# Patient Record
Sex: FEMALE | Race: WHITE | NOT HISPANIC OR LATINO | Employment: PART TIME | URBAN - METROPOLITAN AREA
[De-identification: names, ages, dates, MRNs, and addresses within clinical notes are randomized per-mention and may not be internally consistent; named-entity substitution may affect disease eponyms.]

---

## 2022-08-19 ENCOUNTER — HOSPITAL ENCOUNTER (EMERGENCY)
Facility: HOSPITAL | Age: 52
Discharge: HOME | End: 2022-08-19
Attending: EMERGENCY MEDICINE
Payer: MEDICAID

## 2022-08-19 VITALS
RESPIRATION RATE: 18 BRPM | HEIGHT: 63 IN | HEART RATE: 99 BPM | WEIGHT: 146.25 LBS | DIASTOLIC BLOOD PRESSURE: 98 MMHG | SYSTOLIC BLOOD PRESSURE: 177 MMHG | BODY MASS INDEX: 25.91 KG/M2 | TEMPERATURE: 98 F | OXYGEN SATURATION: 99 %

## 2022-08-19 DIAGNOSIS — I10 HYPERTENSION, UNSPECIFIED TYPE: ICD-10-CM

## 2022-08-19 DIAGNOSIS — S60.00XA CONTUSION OF MULTIPLE SITES OF RIGHT HAND AND FINGERS, INITIAL ENCOUNTER: Primary | ICD-10-CM

## 2022-08-19 DIAGNOSIS — S60.221A CONTUSION OF MULTIPLE SITES OF RIGHT HAND AND FINGERS, INITIAL ENCOUNTER: Primary | ICD-10-CM

## 2022-08-19 PROCEDURE — 63600175 PHARM REV CODE 636 W HCPCS: Mod: ER | Performed by: EMERGENCY MEDICINE

## 2022-08-19 PROCEDURE — 25000003 PHARM REV CODE 250: Mod: ER | Performed by: EMERGENCY MEDICINE

## 2022-08-19 PROCEDURE — 90471 IMMUNIZATION ADMIN: CPT | Mod: ER | Performed by: EMERGENCY MEDICINE

## 2022-08-19 PROCEDURE — 90715 TDAP VACCINE 7 YRS/> IM: CPT | Mod: ER | Performed by: EMERGENCY MEDICINE

## 2022-08-19 PROCEDURE — 99284 EMERGENCY DEPT VISIT MOD MDM: CPT | Mod: ER

## 2022-08-19 RX ORDER — CLONIDINE HYDROCHLORIDE 0.1 MG/1
0.1 TABLET ORAL
Status: COMPLETED | OUTPATIENT
Start: 2022-08-19 | End: 2022-08-19

## 2022-08-19 RX ORDER — NAPROXEN 500 MG/1
500 TABLET ORAL 2 TIMES DAILY WITH MEALS
Qty: 60 TABLET | Refills: 0 | Status: SHIPPED | OUTPATIENT
Start: 2022-08-19

## 2022-08-19 RX ADMIN — TETANUS TOXOID, REDUCED DIPHTHERIA TOXOID AND ACELLULAR PERTUSSIS VACCINE, ADSORBED 0.5 ML: 5; 2.5; 8; 8; 2.5 SUSPENSION INTRAMUSCULAR at 01:08

## 2022-08-19 RX ADMIN — CLONIDINE HYDROCHLORIDE 0.1 MG: 0.1 TABLET ORAL at 01:08

## 2022-08-19 NOTE — ED NOTES
Fingers washed with a soft sponge brush and soap. Dead skin cut away. telfa padding used for dressing and then the middle and 3rd finger wrapped together for support. Pt tolerated well.

## 2022-08-19 NOTE — ED PROVIDER NOTES
Encounter Date: 8/19/2022       History     Chief Complaint   Patient presents with    Hand Injury     Pt pulled a heavy table onto her right hand, injuring her first three fingers. Pt needs a tetanus shot.     The history is provided by the patient.   Hand Injury   The incident occurred just prior to arrival. The incident occurred at home. The injury mechanism was compression. The pain is present in the right fingers. The quality of the pain is described as sharp. The pain is at a severity of 7/10. The pain has been constant since the incident. Pertinent negatives include no fever and no malaise/fatigue. She reports no foreign bodies present. The symptoms are aggravated by movement, use and palpation.     Review of patient's allergies indicates:   Allergen Reactions    Demerol [meperidine] Nausea And Vomiting and Other (See Comments)     migraine     Past Medical History:   Diagnosis Date    Abnormal Pap smear     Cryo 1992, history of warts, normal pap since    Hypertension      Past Surgical History:   Procedure Laterality Date    HYSTERECTOMY      RALH    polynidal cyst      tonsillectomy       Family History   Problem Relation Age of Onset    Diabetes Maternal Grandmother     Diabetes Mother     Breast cancer Neg Hx     Colon cancer Neg Hx     Ovarian cancer Neg Hx     Thrombophilia Neg Hx      Social History     Tobacco Use    Smoking status: Current Every Day Smoker     Packs/day: 1.00     Years: 21.00     Pack years: 21.00     Types: Cigarettes    Smokeless tobacco: Never Used   Substance Use Topics    Alcohol use: Yes     Comment: socially    Drug use: No     Review of Systems   Constitutional: Negative for fever and malaise/fatigue.   Musculoskeletal:        Right Hand Injury   All other systems reviewed and are negative.      Physical Exam     Initial Vitals [08/19/22 0120]   BP Pulse Resp Temp SpO2   (!) 199/125 102 19 97.9 °F (36.6 °C) 96 %      MAP       --         Physical  Exam    Nursing note and vitals reviewed.  Constitutional: She appears well-developed and well-nourished. No distress.   HENT:   Head: Normocephalic and atraumatic.   Eyes: Conjunctivae and EOM are normal. Pupils are equal, round, and reactive to light.   Cardiovascular: Normal rate, regular rhythm and normal heart sounds.   Pulmonary/Chest: Breath sounds normal. No respiratory distress.   Abdominal: Abdomen is soft. Bowel sounds are normal.   Musculoskeletal:      Right hand: Swelling, tenderness and bony tenderness present. No deformity or lacerations. Decreased range of motion.      Comments: Right Hand 2,3,4th digit with ecchymoses and abrasion, No laceration, No tendon involvement.     Neurological: She is alert and oriented to person, place, and time. She has normal strength.   Skin: Skin is warm and dry.   Psychiatric: She has a normal mood and affect. Thought content normal.         ED Course   Procedures  Labs Reviewed - No data to display       Imaging Results          X-Ray Hand 3 view Right (Preliminary result)  Result time 08/19/22 01:55:59    ED Interpretation by Maicol Mayfield MD (08/19/22 01:55:59, [facility], Emergency Medicine)    Neg fx                  ED Interpretation by Maicol Mayfield MD (08/19/22 01:52:44, [facility], Emergency Medicine)    No fx                          1:57 AM - Counseling: Spoke with the patient and discussed todays findings, in addition to providing specific details for the plan of care and counseling regarding the diagnosis and prognosis. Questions are answered at this time.    Pre-hypertension/Hypertension: The pt has been informed that they may have pre-hypertension or hypertension based on a blood pressure reading in the ED. I recommend that the pt call the PCP listed on their discharge instructions or a physician of their choice this week to arrange f/u for further evaluation of possible pre-hypertension or  hypertension.         Medications   Tdap (BOOSTRIX) vaccine injection 0.5 mL (0.5 mLs Intramuscular Given 8/19/22 0132)   cloNIDine tablet 0.1 mg (0.1 mg Oral Given 8/19/22 0154)                          Clinical Impression:   Final diagnoses:  [I10] Hypertension, unspecified type  [S60.221A, S60.00XA] Contusion of multiple sites of right hand and fingers, initial encounter (Primary)          ED Disposition Condition    Discharge Stable        ED Prescriptions     Medication Sig Dispense Start Date End Date Auth. Provider    naproxen (NAPROSYN) 500 MG tablet Take 1 tablet (500 mg total) by mouth 2 (two) times daily with meals. 60 tablet 8/19/2022  Maicol Mayfield MD        Follow-up Information     Follow up With Specialties Details Why Contact Info    Quintin Ying MD Family Medicine Call in 1 week As needed [address] [place] [address] FAMILY MEDICINE  [facility] [place] [id number]  [phone]      [facility] - Emergency Dept Emergency Medicine  If symptoms worsen [id number] [address] [id number] [phone]           Maicol Mayfield MD  08/19/22 0157

## 2024-05-27 ENCOUNTER — HOSPITAL ENCOUNTER (INPATIENT)
Facility: HOSPITAL | Age: 54
LOS: 4 days | Discharge: HOME | DRG: 321 | End: 2024-05-31
Attending: EMERGENCY MEDICINE | Admitting: FAMILY MEDICINE
Payer: MEDICAID

## 2024-05-27 DIAGNOSIS — Z78.9 ALCOHOL USE: ICD-10-CM

## 2024-05-27 DIAGNOSIS — I20.89 STABLE ANGINA PECTORIS: ICD-10-CM

## 2024-05-27 DIAGNOSIS — R79.89 ELEVATED TROPONIN: ICD-10-CM

## 2024-05-27 DIAGNOSIS — R00.2 HEART PALPITATIONS: ICD-10-CM

## 2024-05-27 DIAGNOSIS — I51.7 LVH (LEFT VENTRICULAR HYPERTROPHY): ICD-10-CM

## 2024-05-27 DIAGNOSIS — I21.4 NSTEMI (NON-ST ELEVATED MYOCARDIAL INFARCTION): ICD-10-CM

## 2024-05-27 DIAGNOSIS — I16.1 HYPERTENSIVE EMERGENCY: ICD-10-CM

## 2024-05-27 DIAGNOSIS — M62.838 MUSCLE SPASMS OF LOWER EXTREMITY: ICD-10-CM

## 2024-05-27 DIAGNOSIS — I25.10 CAD, MULTIPLE VESSEL: ICD-10-CM

## 2024-05-27 DIAGNOSIS — I50.9 NEW ONSET OF CONGESTIVE HEART FAILURE: ICD-10-CM

## 2024-05-27 DIAGNOSIS — I42.9 CARDIOMYOPATHY, UNSPECIFIED TYPE: ICD-10-CM

## 2024-05-27 DIAGNOSIS — I20.9 ANGINA PECTORIS: ICD-10-CM

## 2024-05-27 DIAGNOSIS — I51.7 CARDIOMEGALY: ICD-10-CM

## 2024-05-27 DIAGNOSIS — R07.9 CHEST PAIN: ICD-10-CM

## 2024-05-27 DIAGNOSIS — R79.89 ELEVATED BRAIN NATRIURETIC PEPTIDE (BNP) LEVEL: ICD-10-CM

## 2024-05-27 DIAGNOSIS — R00.2 PALPITATIONS: ICD-10-CM

## 2024-05-27 DIAGNOSIS — Z72.0 TOBACCO USE: Primary | ICD-10-CM

## 2024-05-27 LAB
ALBUMIN SERPL BCP-MCNC: 4.8 G/DL (ref 3.5–5.2)
ALP SERPL-CCNC: 85 U/L (ref 55–135)
ALT SERPL W/O P-5'-P-CCNC: 20 U/L (ref 10–44)
AMPHET+METHAMPHET UR QL: NEGATIVE
ANION GAP SERPL CALC-SCNC: 17 MMOL/L (ref 8–16)
AST SERPL-CCNC: 28 U/L (ref 10–40)
BARBITURATES UR QL SCN>200 NG/ML: NEGATIVE
BASOPHILS # BLD AUTO: 0.03 K/UL (ref 0–0.2)
BASOPHILS NFR BLD: 0.3 % (ref 0–1.9)
BENZODIAZ UR QL SCN>200 NG/ML: NEGATIVE
BILIRUB SERPL-MCNC: 1.1 MG/DL (ref 0.1–1)
BILIRUB UR QL STRIP: NEGATIVE
BNP SERPL-MCNC: 333 PG/ML (ref 0–99)
BUN SERPL-MCNC: 12 MG/DL (ref 6–20)
BZE UR QL SCN: NEGATIVE
CALCIUM SERPL-MCNC: 10.1 MG/DL (ref 8.7–10.5)
CANNABINOIDS UR QL SCN: NEGATIVE
CHLORIDE SERPL-SCNC: 98 MMOL/L (ref 95–110)
CLARITY UR REFRACT.AUTO: CLEAR
CO2 SERPL-SCNC: 22 MMOL/L (ref 23–29)
COLOR UR AUTO: YELLOW
CREAT SERPL-MCNC: 0.9 MG/DL (ref 0.5–1.4)
CREAT UR-MCNC: 17 MG/DL (ref 15–325)
D DIMER PPP IA.FEU-MCNC: 0.39 MG/L FEU
DIFFERENTIAL METHOD BLD: ABNORMAL
EOSINOPHIL # BLD AUTO: 0.1 K/UL (ref 0–0.5)
EOSINOPHIL NFR BLD: 0.6 % (ref 0–8)
ERYTHROCYTE [DISTWIDTH] IN BLOOD BY AUTOMATED COUNT: 12.5 % (ref 11.5–14.5)
EST. GFR  (NO RACE VARIABLE): >60 ML/MIN/1.73 M^2
ETHANOL SERPL-MCNC: <10 MG/DL
GLUCOSE SERPL-MCNC: 112 MG/DL (ref 70–110)
GLUCOSE UR QL STRIP: NEGATIVE
HCT VFR BLD AUTO: 43.8 % (ref 37–48.5)
HEP C VIRUS HOLD SPECIMEN: NORMAL
HGB BLD-MCNC: 15.8 G/DL (ref 12–16)
HGB UR QL STRIP: NEGATIVE
IMM GRANULOCYTES # BLD AUTO: 0.02 K/UL (ref 0–0.04)
IMM GRANULOCYTES NFR BLD AUTO: 0.2 % (ref 0–0.5)
KETONES UR QL STRIP: NEGATIVE
LEUKOCYTE ESTERASE UR QL STRIP: NEGATIVE
LYMPHOCYTES # BLD AUTO: 2.6 K/UL (ref 1–4.8)
LYMPHOCYTES NFR BLD: 27.6 % (ref 18–48)
MAGNESIUM SERPL-MCNC: 1.4 MG/DL (ref 1.6–2.6)
MCH RBC QN AUTO: 35.6 PG (ref 27–31)
MCHC RBC AUTO-ENTMCNC: 36.1 G/DL (ref 32–36)
MCV RBC AUTO: 99 FL (ref 82–98)
METHADONE UR QL SCN>300 NG/ML: NEGATIVE
MONOCYTES # BLD AUTO: 0.6 K/UL (ref 0.3–1)
MONOCYTES NFR BLD: 5.9 % (ref 4–15)
NEUTROPHILS # BLD AUTO: 6.3 K/UL (ref 1.8–7.7)
NEUTROPHILS NFR BLD: 65.4 % (ref 38–73)
NITRITE UR QL STRIP: NEGATIVE
NRBC BLD-RTO: 0 /100 WBC
OHS QRS DURATION: 86 MS
OHS QTC CALCULATION: 507 MS
OPIATES UR QL SCN: NEGATIVE
PCP UR QL SCN>25 NG/ML: NEGATIVE
PH UR STRIP: 7 [PH] (ref 5–8)
PLATELET # BLD AUTO: 233 K/UL (ref 150–450)
PMV BLD AUTO: 9.3 FL (ref 9.2–12.9)
POTASSIUM SERPL-SCNC: 3.4 MMOL/L (ref 3.5–5.1)
PROT SERPL-MCNC: 8.4 G/DL (ref 6–8.4)
PROT UR QL STRIP: NEGATIVE
RBC # BLD AUTO: 4.44 M/UL (ref 4–5.4)
SODIUM SERPL-SCNC: 137 MMOL/L (ref 136–145)
SP GR UR STRIP: 1.01 (ref 1–1.03)
TOXICOLOGY INFORMATION: NORMAL
TROPONIN I SERPL DL<=0.01 NG/ML-MCNC: 0.02 NG/ML (ref 0–0.03)
TROPONIN I SERPL DL<=0.01 NG/ML-MCNC: 0.03 NG/ML (ref 0–0.03)
TSH SERPL DL<=0.005 MIU/L-ACNC: 3.31 UIU/ML (ref 0.4–4)
URN SPEC COLLECT METH UR: NORMAL
UROBILINOGEN UR STRIP-ACNC: NEGATIVE EU/DL
WBC # BLD AUTO: 9.56 K/UL (ref 3.9–12.7)

## 2024-05-27 PROCEDURE — 96361 HYDRATE IV INFUSION ADD-ON: CPT | Mod: ER

## 2024-05-27 PROCEDURE — 83880 ASSAY OF NATRIURETIC PEPTIDE: CPT | Mod: ER | Performed by: EMERGENCY MEDICINE

## 2024-05-27 PROCEDURE — 85379 FIBRIN DEGRADATION QUANT: CPT | Mod: ER | Performed by: EMERGENCY MEDICINE

## 2024-05-27 PROCEDURE — G0378 HOSPITAL OBSERVATION PER HR: HCPCS | Mod: ER

## 2024-05-27 PROCEDURE — 63600175 PHARM REV CODE 636 W HCPCS: Mod: ER | Performed by: EMERGENCY MEDICINE

## 2024-05-27 PROCEDURE — 81003 URINALYSIS AUTO W/O SCOPE: CPT | Mod: ER,59 | Performed by: EMERGENCY MEDICINE

## 2024-05-27 PROCEDURE — 80053 COMPREHEN METABOLIC PANEL: CPT | Mod: ER | Performed by: EMERGENCY MEDICINE

## 2024-05-27 PROCEDURE — 25000003 PHARM REV CODE 250: Mod: ER | Performed by: EMERGENCY MEDICINE

## 2024-05-27 PROCEDURE — 85025 COMPLETE CBC W/AUTO DIFF WBC: CPT | Mod: ER | Performed by: EMERGENCY MEDICINE

## 2024-05-27 PROCEDURE — 96366 THER/PROPH/DIAG IV INF ADDON: CPT | Mod: 59,ER

## 2024-05-27 PROCEDURE — 86803 HEPATITIS C AB TEST: CPT | Performed by: EMERGENCY MEDICINE

## 2024-05-27 PROCEDURE — 11000001 HC ACUTE MED/SURG PRIVATE ROOM

## 2024-05-27 PROCEDURE — 84443 ASSAY THYROID STIM HORMONE: CPT | Mod: ER | Performed by: EMERGENCY MEDICINE

## 2024-05-27 PROCEDURE — 87389 HIV-1 AG W/HIV-1&-2 AB AG IA: CPT | Performed by: EMERGENCY MEDICINE

## 2024-05-27 PROCEDURE — 82077 ASSAY SPEC XCP UR&BREATH IA: CPT | Mod: ER | Performed by: EMERGENCY MEDICINE

## 2024-05-27 PROCEDURE — 96375 TX/PRO/DX INJ NEW DRUG ADDON: CPT | Mod: 59,ER

## 2024-05-27 PROCEDURE — 84484 ASSAY OF TROPONIN QUANT: CPT | Mod: ER | Performed by: EMERGENCY MEDICINE

## 2024-05-27 PROCEDURE — 94761 N-INVAS EAR/PLS OXIMETRY MLT: CPT | Mod: ER

## 2024-05-27 PROCEDURE — 84484 ASSAY OF TROPONIN QUANT: CPT | Mod: 91,ER | Performed by: EMERGENCY MEDICINE

## 2024-05-27 PROCEDURE — 96365 THER/PROPH/DIAG IV INF INIT: CPT | Mod: 59,ER

## 2024-05-27 PROCEDURE — 83735 ASSAY OF MAGNESIUM: CPT | Mod: ER | Performed by: EMERGENCY MEDICINE

## 2024-05-27 PROCEDURE — 80307 DRUG TEST PRSMV CHEM ANLYZR: CPT | Mod: ER | Performed by: EMERGENCY MEDICINE

## 2024-05-27 PROCEDURE — 99291 CRITICAL CARE FIRST HOUR: CPT | Mod: ER

## 2024-05-27 RX ORDER — LABETALOL HYDROCHLORIDE 5 MG/ML
10 INJECTION, SOLUTION INTRAVENOUS
Status: COMPLETED | OUTPATIENT
Start: 2024-05-27 | End: 2024-05-27

## 2024-05-27 RX ORDER — METHOCARBAMOL 100 MG/ML
1000 INJECTION, SOLUTION INTRAMUSCULAR; INTRAVENOUS ONCE
Status: COMPLETED | OUTPATIENT
Start: 2024-05-27 | End: 2024-05-27

## 2024-05-27 RX ORDER — POTASSIUM CHLORIDE 20 MEQ/1
40 TABLET, EXTENDED RELEASE ORAL
Status: COMPLETED | OUTPATIENT
Start: 2024-05-27 | End: 2024-05-27

## 2024-05-27 RX ORDER — MAGNESIUM SULFATE HEPTAHYDRATE 40 MG/ML
2 INJECTION, SOLUTION INTRAVENOUS
Status: COMPLETED | OUTPATIENT
Start: 2024-05-27 | End: 2024-05-27

## 2024-05-27 RX ORDER — HYDRALAZINE HYDROCHLORIDE 20 MG/ML
10 INJECTION INTRAMUSCULAR; INTRAVENOUS
Status: COMPLETED | OUTPATIENT
Start: 2024-05-27 | End: 2024-05-27

## 2024-05-27 RX ORDER — ASPIRIN 325 MG
325 TABLET ORAL
Status: COMPLETED | OUTPATIENT
Start: 2024-05-27 | End: 2024-05-27

## 2024-05-27 RX ORDER — AMLODIPINE BESYLATE 5 MG/1
10 TABLET ORAL
Status: COMPLETED | OUTPATIENT
Start: 2024-05-27 | End: 2024-05-27

## 2024-05-27 RX ADMIN — LABETALOL HYDROCHLORIDE 10 MG: 5 INJECTION INTRAVENOUS at 06:05

## 2024-05-27 RX ADMIN — AMLODIPINE BESYLATE 10 MG: 5 TABLET ORAL at 07:05

## 2024-05-27 RX ADMIN — THIAMINE HYDROCHLORIDE 100 MG: 100 INJECTION, SOLUTION INTRAMUSCULAR; INTRAVENOUS at 06:05

## 2024-05-27 RX ADMIN — METHOCARBAMOL 1000 MG: 100 INJECTION INTRAMUSCULAR; INTRAVENOUS at 08:05

## 2024-05-27 RX ADMIN — HYDRALAZINE HYDROCHLORIDE 10 MG: 20 INJECTION, SOLUTION INTRAMUSCULAR; INTRAVENOUS at 08:05

## 2024-05-27 RX ADMIN — HYDRALAZINE HYDROCHLORIDE 10 MG: 20 INJECTION, SOLUTION INTRAMUSCULAR; INTRAVENOUS at 11:05

## 2024-05-27 RX ADMIN — MAGNESIUM SULFATE HEPTAHYDRATE 2 G: 40 INJECTION, SOLUTION INTRAVENOUS at 05:05

## 2024-05-27 RX ADMIN — ASPIRIN 325 MG: 325 TABLET ORAL at 09:05

## 2024-05-27 RX ADMIN — POTASSIUM CHLORIDE 40 MEQ: 1500 TABLET, EXTENDED RELEASE ORAL at 07:05

## 2024-05-27 RX ADMIN — LABETALOL HYDROCHLORIDE 10 MG: 5 INJECTION INTRAVENOUS at 04:05

## 2024-05-27 RX ADMIN — SODIUM CHLORIDE, POTASSIUM CHLORIDE, SODIUM LACTATE AND CALCIUM CHLORIDE 1000 ML: 600; 310; 30; 20 INJECTION, SOLUTION INTRAVENOUS at 08:05

## 2024-05-27 NOTE — Clinical Note
"Pt states that she is "itchy all over." Contrast pretreatment given and pt has no complaints at this time. "

## 2024-05-27 NOTE — Clinical Note
The PA catheter was repositioned to the main pulmonary artery. Hemodynamics were performed. Cardiac output was obtained at 4 L/min. An angiography was performed.

## 2024-05-27 NOTE — Clinical Note
The closure device was deployed in the left radial artery. 14 cc's of air were inserted into the closure device.

## 2024-05-27 NOTE — ED PROVIDER NOTES
Emergency Medicine Provider Note - 5/27/2024       History     Chief Complaint   Patient presents with    Hypertension     Elevated BP and feels heart racing today.       Allergies:  Review of patient's allergies indicates:   Allergen Reactions    Demerol [meperidine] Nausea And Vomiting and Other (See Comments)     migraine        History of Present Illness   HPI    5/27/2024, 3:48 PM  The history is provided by the patient    Eloisa Camara is a 53 y.o. female presenting to the ED for elevated blood pressure reading.  Patient has a history of high blood pressure.  She has been on benazepril in the past as well as hydrochlorothiazide.  Patient reports that she had good day yesterday.  They had gone to the [employer] festival and ate 3 dozen oysters.  She states that she woke up this morning, and suddenly felt like her heart was racing.  She had had found an old prescription of hydrochlorothiazide 25 mg and took it this morning.  She noticed that her apple watch was telling her heart rate was up to 140.  She reports that she felt like her heart was beating fast.  Patient does drink alcohol daily.  She denies any signs or symptoms of acute alcohol withdrawal.  She denies any nausea, vomiting, shaking, paresthesias, fever.  Patient did take a cup of coffee with an extra shot today from cc.  Patient denies amphetamines, marijuana, cocaine.  Patient denies any chest pain, chest pressure, shortness of breath, nausea, vomiting, difficulty breathing, headache, numbness or weakness to 1 side, slurred speech.  Patient notes that she had muscle cramps last night      Note Per Primary Care Provider:  The patient, a 53-year-old female, presents with her , reporting a sudden onset of increased blood pressure starting today. She describes the sensation of her heart racing, which prompted her concern for elevated blood pressure. Objective measurements taken in the clinic confirmed her blood pressure to be critically high at  219/144 mmHg initially, with a subsequent reading of 218/135 mmHg. Her heart rate was noted to be 116 bpm on both occasions. The patient denies any identifiable cause or precipitating factors for this episode. She reports no recent excessive caffeine intake, anxiety, or other stressors that could potentially explain this acute rise in blood pressure. Interestingly, the patient mentioned consuming three dozen oysters the day before presentation, though she denies any gastrointestinal symptoms such as abdominal pain, nausea, vomiting, or diarrhea, as well as denying any symptoms of chest pain, weakness, or fever. Her past medical history is significant for hypertension, for which she was previously prescribed benazepril 20 mg (last filled in 2017) and hydrochlorothiazide 25 mg (last filled in 2019), but she reports not taking any antihypertensive medications recently. The patient's history of non-compliance with antihypertensive therapy and the sudden, severe elevation in blood pressure are of particular concern.     Arrival mode: Private Vehicle     PCP: Quintin Ying MD     Past Medical History:  Past Medical History:   Diagnosis Date    Abnormal Pap smear     Cryo 1992, history of warts, normal pap since    Hypertension        Past Surgical History:  Past Surgical History:   Procedure Laterality Date    HYSTERECTOMY      RALH    polynidal cyst      tonsillectomy           Family History:  Family History   Problem Relation Name Age of Onset    Diabetes Maternal Grandmother      Diabetes Mother      Breast cancer Neg Hx      Colon cancer Neg Hx      Ovarian cancer Neg Hx      Thrombophilia Neg Hx         Social History:  Social History     Tobacco Use    Smoking status: Every Day     Current packs/day: 1.00     Average packs/day: 1 pack/day for 21.0 years (21.0 ttl pk-yrs)     Types: Cigarettes    Smokeless tobacco: Never   Substance and Sexual Activity    Alcohol use: Yes     Comment: socially    Drug use: No     Sexual activity: Yes     Partners: Male     Birth control/protection: Surgical     Comment: mut monog        Review of Systems   Review of Systems   Constitutional:  Negative for fever.   HENT:  Negative for sore throat.    Respiratory:  Negative for cough and shortness of breath.    Cardiovascular:  Positive for palpitations. Negative for chest pain.   Gastrointestinal:  Negative for nausea and vomiting.   Genitourinary:  Negative for dysuria.   Musculoskeletal:  Negative for back pain.   Skin:  Negative for rash.   Neurological:  Negative for dizziness, syncope, facial asymmetry, speech difficulty, weakness, light-headedness, numbness and headaches.   Hematological:  Does not bruise/bleed easily.      Physical Exam     Initial Vitals [05/27/24 1511]   BP Pulse Resp Temp SpO2   (!) 231/135 (!) 119 18 98.2 °F (36.8 °C) 98 %      MAP       --          Physical Exam    Nursing Notes and Vital Signs Reviewed.  Constitutional: Patient is in no apparent distress. Well-developed and well-nourished.  Head: Atraumatic. Normocephalic.  Eyes: PERRL. EOM intact. Conjunctivae are not pale. No scleral icterus.  ENT: Mucous membranes are moist. Oropharynx is clear and symmetric.    Neck: Supple. Full ROM. No lymphadenopathy.  Cardiovascular:Tachycardia.  (+) murmurs.  No rubs, or gallops. Distal pulses are 2+ and symmetric.  Pulmonary/Chest: No respiratory distress. Clear to auscultation bilaterally. No wheezing or rales.  Abdominal: Soft and non-distended.  There is no tenderness.  No rebound, guarding, or rigidity. Good bowel sounds.  Genitourinary: No CVA tenderness  Musculoskeletal: Moves all extremities. No obvious deformities. No edema. No calf tenderness.  Skin: Warm and dry.  Neurological:  Alert, awake, and appropriate.  Normal speech.  No acute focal neurological deficits are appreciated.  Cranial nerves 2-12 intact.  No focal deficits.  No asterixis.  No tremor.  Psychiatric: Normal affect. Good eye contact.  Appropriate in content.     ED Course   ED Procedures:  Critical Care    Date/Time: 5/28/2024 12:15 AM    Performed by: Peter Goodwin MD  Authorized by: Peter Goodwin MD  Direct patient critical care time: 15 minutes  Additional history critical care time: 5 minutes  Ordering / reviewing critical care time: 5 minutes  Documentation critical care time: 5 minutes  Consulting other physicians critical care time: 5 minutes  Total critical care time (exclusive of procedural time) : 35 minutes  Critical care time was exclusive of separately billable procedures and treating other patients and teaching time.  Critical care was necessary to treat or prevent imminent or life-threatening deterioration of the following conditions: Hypertensive emergency, elevated troponin.  Treated with multiple IV antihypertensives and admitted to the hospital.  Critical care was time spent personally by me on the following activities: blood draw for specimens, development of treatment plan with patient or surrogate, discussions with consultants, interpretation of cardiac output measurements, evaluation of patient's response to treatment, examination of patient, obtaining history from patient or surrogate, ordering and performing treatments and interventions, ordering and review of laboratory studies, ordering and review of radiographic studies, pulse oximetry, re-evaluation of patient's condition and review of old charts.          ED Vital Signs:  Vitals:    05/27/24 1817 05/27/24 1832 05/27/24 1923 05/27/24 1947   BP: (!) 215/100 (!) 182/86 (!) 188/116 (!) 172/115   Pulse:  79 94 94   Resp:       Temp:       TempSrc:       SpO2:  (!) 94% (!) 93% (!) 94%   Weight:       Height:        05/27/24 2009 05/27/24 2013 05/27/24 2032 05/27/24 2101   BP: (!) 151/77 (!) 169/84 (!) 181/100 (!) 158/88   Pulse: 91 88  88   Resp:       Temp:       TempSrc:       SpO2:  (!) 93%     Weight:       Height:        05/27/24 2113 05/27/24 2211 05/27/24 2232  05/27/24 2242   BP: (!) 169/92 (!) 200/101 (!) 197/101 (!) 200/94   Pulse: 89 90 89 82   Resp:  18     Temp:       TempSrc:       SpO2:    (!) 94%   Weight:       Height:        05/27/24 2302 05/27/24 2313 05/28/24 0002   BP: (!) 164/90 (!) 196/91 (!) 191/88   Pulse: 107 87 95   Resp:  15    Temp:      TempSrc:      SpO2: (!) 93% 95%    Weight:      Height:          Abnormal Lab Results:  Labs Reviewed   CBC W/ AUTO DIFFERENTIAL - Abnormal; Notable for the following components:       Result Value    MCV 99 (*)     MCH 35.6 (*)     MCHC 36.1 (*)     All other components within normal limits   COMPREHENSIVE METABOLIC PANEL - Abnormal; Notable for the following components:    Potassium 3.4 (*)     CO2 22 (*)     Glucose 112 (*)     Total Bilirubin 1.1 (*)     Anion Gap 17 (*)     All other components within normal limits   B-TYPE NATRIURETIC PEPTIDE - Abnormal; Notable for the following components:     (*)     All other components within normal limits   MAGNESIUM - Abnormal; Notable for the following components:    Magnesium 1.4 (*)     All other components within normal limits   TROPONIN I - Abnormal; Notable for the following components:    Troponin I 0.031 (*)     All other components within normal limits   HEP C VIRUS HOLD SPECIMEN    Narrative:     Release to patient->Immediate   TROPONIN I   ALCOHOL,MEDICAL (ETHANOL)   URINALYSIS, REFLEX TO URINE CULTURE    Narrative:     Specimen Source->Urine   DRUG SCREEN PANEL, URINE EMERGENCY    Narrative:     Specimen Source->Urine   D DIMER, QUANTITATIVE   TSH   D DIMER, QUANTITATIVE   TSH   HIV 1 / 2 ANTIBODY   HEPATITIS C ANTIBODY   TROPONIN I        All Lab Results:  Results for orders placed or performed during the hospital encounter of 05/27/24   HCV Virus Hold Specimen   Result Value Ref Range    HEP C Virus Hold Specimen Hold for HCV sendout    CBC auto differential   Result Value Ref Range    WBC 9.56 3.90 - 12.70 K/uL    RBC 4.44 4.00 - 5.40 M/uL     Hemoglobin 15.8 12.0 - 16.0 g/dL    Hematocrit 43.8 37.0 - 48.5 %    MCV 99 (H) 82 - 98 fL    MCH 35.6 (H) 27.0 - 31.0 pg    MCHC 36.1 (H) 32.0 - 36.0 g/dL    RDW 12.5 11.5 - 14.5 %    Platelets 233 150 - 450 K/uL    MPV 9.3 9.2 - 12.9 fL    Immature Granulocytes 0.2 0.0 - 0.5 %    Gran # (ANC) 6.3 1.8 - 7.7 K/uL    Immature Grans (Abs) 0.02 0.00 - 0.04 K/uL    Lymph # 2.6 1.0 - 4.8 K/uL    Mono # 0.6 0.3 - 1.0 K/uL    Eos # 0.1 0.0 - 0.5 K/uL    Baso # 0.03 0.00 - 0.20 K/uL    nRBC 0 0 /100 WBC    Gran % 65.4 38.0 - 73.0 %    Lymph % 27.6 18.0 - 48.0 %    Mono % 5.9 4.0 - 15.0 %    Eosinophil % 0.6 0.0 - 8.0 %    Basophil % 0.3 0.0 - 1.9 %    Differential Method Automated    Comprehensive metabolic panel   Result Value Ref Range    Sodium 137 136 - 145 mmol/L    Potassium 3.4 (L) 3.5 - 5.1 mmol/L    Chloride 98 95 - 110 mmol/L    CO2 22 (L) 23 - 29 mmol/L    Glucose 112 (H) 70 - 110 mg/dL    BUN 12 6 - 20 mg/dL    Creatinine 0.9 0.5 - 1.4 mg/dL    Calcium 10.1 8.7 - 10.5 mg/dL    Total Protein 8.4 6.0 - 8.4 g/dL    Albumin 4.8 3.5 - 5.2 g/dL    Total Bilirubin 1.1 (H) 0.1 - 1.0 mg/dL    Alkaline Phosphatase 85 55 - 135 U/L    AST 28 10 - 40 U/L    ALT 20 10 - 44 U/L    eGFR >60.0 >60 mL/min/1.73 m^2    Anion Gap 17 (H) 8 - 16 mmol/L   Troponin I #1   Result Value Ref Range    Troponin I 0.018 0.000 - 0.026 ng/mL   BNP   Result Value Ref Range     (H) 0 - 99 pg/mL   Magnesium   Result Value Ref Range    Magnesium 1.4 (L) 1.6 - 2.6 mg/dL   Ethanol   Result Value Ref Range    Alcohol, Serum <10 <10 mg/dL   Urinalysis, Reflex to Urine Culture Urine, Clean Catch    Specimen: Urine   Result Value Ref Range    Specimen UA Urine, Clean Catch     Color, UA Yellow Yellow, Straw, Cheryl    Appearance, UA Clear Clear    pH, UA 7.0 5.0 - 8.0    Specific Gravity, UA 1.010 1.005 - 1.030    Protein, UA Negative Negative    Glucose, UA Negative Negative    Ketones, UA Negative Negative    Bilirubin (UA) Negative Negative     Occult Blood UA Negative Negative    Nitrite, UA Negative Negative    Urobilinogen, UA Negative <2.0 EU/dL    Leukocytes, UA Negative Negative   Drug screen panel, emergency   Result Value Ref Range    Benzodiazepines Negative Negative    Methadone metabolites Negative Negative    Cocaine (Metab.) Negative Negative    Opiate Scrn, Ur Negative Negative    Barbiturate Screen, Ur Negative Negative    Amphetamine Screen, Ur Negative Negative    THC Negative Negative    Phencyclidine Negative Negative    Creatinine, Urine 17.0 15.0 - 325.0 mg/dL    Toxicology Information SEE COMMENT    D-Dimer, Quantitative   Result Value Ref Range    D-Dimer 0.39 <0.50 mg/L FEU   TSH   Result Value Ref Range    TSH 3.305 0.400 - 4.000 uIU/mL   Troponin I   Result Value Ref Range    Troponin I 0.031 (H) 0.000 - 0.026 ng/mL           The EKG was ordered, reviewed, and independently interpreted by the ED provider:      ECG Results              EKG 12-lead (Preliminary result)  Result time 05/27/24 16:13:13      Wet Read by Sameera Joshi DO (05/27/24 16:13:13, [facility] Dept, Emergency Medicine)    Rate of 114 beats per minute.  Sinus tachycardia.  Poor R-wave progression.  No STEMI                                    Imaging Results:  Imaging Results              X-Ray Chest AP Portable (Final result)  Result time 05/27/24 16:36:21      Final result by Sherri Lee MD (05/27/24 16:36:21)                   Impression:      Cardiomegaly, likely accentuated by technique.  No acute pulmonary abnormality.      Electronically signed by: Sherri Lee  Date:    05/27/2024  Time:    16:36               Narrative:    EXAMINATION:  XR CHEST AP PORTABLE    CLINICAL HISTORY:  Palpitations;    TECHNIQUE:  Single frontal view of the chest was performed.    COMPARISON:  None    FINDINGS:  ECG monitoring leads overlie the chest.The lungs are clear, with normal appearance of pulmonary vasculature and no pleural effusion or  pneumothorax.    The cardiac silhouette is prominent in size, likely accentuated by technique.  There is aortic calcification.    No acute osseous abnormality.  There is degenerative change of the spine.                                            The Emergency Provider reviewed the vital signs and test results, which are outlined above.     ED Discussion   ED Medication(s):  Medications   cloNIDine tablet 0.2 mg (has no administration in time range)   labetaloL injection 10 mg (10 mg Intravenous Given 5/27/24 1644)   magnesium sulfate 2g in water 50mL IVPB (premix) (0 g Intravenous Stopped 5/27/24 1948)   thiamine (B-1) 100 mg in dextrose 5 % (D5W) 100 mL IVPB (0 mg Intravenous Stopped 5/27/24 2033)   labetaloL injection 10 mg (10 mg Intravenous Given 5/27/24 1817)   potassium chloride SA CR tablet 40 mEq (40 mEq Oral Given 5/27/24 1959)   lactated ringers bolus 1,000 mL (0 mLs Intravenous Stopped 5/27/24 2122)   amLODIPine tablet 10 mg (10 mg Oral Given 5/27/24 1959)   hydrALAZINE injection 10 mg (10 mg Intravenous Given 5/27/24 2001)   methocarbamoL injection 1,000 mg (1,000 mg Intravenous Given 5/27/24 2002)   aspirin tablet 325 mg (325 mg Oral Given 5/27/24 2145)   hydrALAZINE injection 10 mg (10 mg Intravenous Given 5/27/24 2320)       ED Course as of 05/28/24 0015   Mon May 27, 2024   1700 Magnesium (!): 1.4 [LB]   1700 D-Dimer: 0.39 [LB]      ED Course User Index  [LB] Sameera Joshi, DO            Medical Decision Making                 Medical Decision Making  Patient initially seen and evaluated by another ED provider.  Care handed off to me with workup pending.    53 y.o. F with daily alcohol and tobacco use (has not seen a doctor since COVID), came in with palpitations, chest discomfort.  Initially saw a primary care physician today.  Referred here for elevated blood pressure.  Presented to the ED with significant blood pressure elevation.  Second troponin slightly bumped  She has signs that she is  trending towards CHF (LVH on EKG, , cardiomegaly on CXR).  Blood pressure was treated with multiple IV antihypertensives.  Aspirin given.  Patient will be admitted to Medicine for further cardiology workup    Problems Addressed:  Muscle spasms of lower extremity: acute illness or injury     Details: Treated in the ED with IV hydration, electrolyte replacement, and muscle relaxer    Amount and/or Complexity of Data Reviewed  External Data Reviewed: notes.     Details: Past medical history, medications, and allergies reviewed  Labs: ordered. Decision-making details documented in ED Course.  Radiology: ordered and independent interpretation performed. Decision-making details documented in ED Course.  ECG/medicine tests: ordered and independent interpretation performed. Decision-making details documented in ED Course.    Risk  OTC drugs.  Prescription drug management.  Parenteral controlled substances.  Decision regarding hospitalization.        Coding    Prescription Management: I performed a review of the patient's current Rx medication list as input by nursing staff.    Patient's Medications   New Prescriptions    No medications on file   Previous Medications    CLOBETASOL 0.05% (TEMOVATE) 0.05 % OINT    As needed    HYDROCHLOROTHIAZIDE (HYDRODIURIL) 25 MG TABLET    Take 25 mg by mouth once daily.    NAPROXEN (NAPROSYN) 500 MG TABLET    Take 1 tablet (500 mg total) by mouth 2 (two) times daily with meals.    POTASSIUM CHLORIDE (KLOR-CON) 10 MEQ TBSR    TAKE ONE TABLET THREE TIMES DAILY WITH FOOD   Modified Medications    No medications on file   Discontinued Medications    No medications on file             Clinical Impression       ICD-10-CM ICD-9-CM   1. Tobacco use  Z72.0 305.1   2. Palpitations  R00.2 785.1   3. Alcohol use  Z78.9 V49.89   4. Elevated troponin  R79.89 790.6   5. Hypertensive emergency  I16.1 401.9   6. Cardiomegaly  I51.7 429.3   7. LVH (left ventricular hypertrophy)  I51.7 429.3   8.  Elevated brain natriuretic peptide (BNP) level  R79.89 790.99   9. Muscle spasms of lower extremity  M62.838 728.85        Disposition        Disposition: Admit to telemetry  Patient condition: Stable               Peter oGodwin MD  05/28/24 0019

## 2024-05-28 PROBLEM — I51.7 CARDIOMEGALY: Status: ACTIVE | Noted: 2024-05-28

## 2024-05-28 PROBLEM — R79.89 ELEVATED BRAIN NATRIURETIC PEPTIDE (BNP) LEVEL: Status: ACTIVE | Noted: 2024-05-28

## 2024-05-28 PROBLEM — I50.9 NEW ONSET OF CONGESTIVE HEART FAILURE: Status: ACTIVE | Noted: 2024-05-28

## 2024-05-28 PROBLEM — I47.20 VENTRICULAR TACHYCARDIA: Status: ACTIVE | Noted: 2024-05-28

## 2024-05-28 PROBLEM — R79.89 ELEVATED TROPONIN: Status: ACTIVE | Noted: 2024-05-28

## 2024-05-28 PROBLEM — I47.20 VENTRICULAR TACHYCARDIA: Status: RESOLVED | Noted: 2024-05-28 | Resolved: 2024-05-28

## 2024-05-28 PROBLEM — I16.1 HYPERTENSIVE EMERGENCY: Status: ACTIVE | Noted: 2024-05-28

## 2024-05-28 LAB
ANION GAP SERPL CALC-SCNC: 14 MMOL/L (ref 8–16)
AORTIC ROOT ANNULUS: 3.28 CM
ASCENDING AORTA: 3.43 CM
AV INDEX (PROSTH): 0.64
AV MEAN GRADIENT: 7 MMHG
AV PEAK GRADIENT: 12 MMHG
AV REGURGITATION PRESSURE HALF TIME: 1106.45 MS
AV VALVE AREA BY VELOCITY RATIO: 1.64 CM²
AV VALVE AREA: 1.84 CM²
AV VELOCITY RATIO: 0.57
BSA FOR ECHO PROCEDURE: 1.64 M2
BUN SERPL-MCNC: 10 MG/DL (ref 6–20)
CALCIUM SERPL-MCNC: 10.3 MG/DL (ref 8.7–10.5)
CHLORIDE SERPL-SCNC: 101 MMOL/L (ref 95–110)
CO2 SERPL-SCNC: 22 MMOL/L (ref 23–29)
CREAT SERPL-MCNC: 0.8 MG/DL (ref 0.5–1.4)
CV ECHO LV RWT: 0.64 CM
DOP CALC AO PEAK VEL: 1.75 M/S
DOP CALC AO VTI: 29.4 CM
DOP CALC LVOT AREA: 2.9 CM2
DOP CALC LVOT DIAMETER: 1.92 CM
DOP CALC LVOT PEAK VEL: 0.99 M/S
DOP CALC LVOT STROKE VOLUME: 54.11 CM3
DOP CALC RVOT PEAK VEL: 0.85 M/S
DOP CALC RVOT VTI: 16.6 CM
DOP CALCLVOT PEAK VEL VTI: 18.7 CM
E WAVE DECELERATION TIME: 227.55 MSEC
E/A RATIO: 0.66
E/E' RATIO: 12.67 M/S
ECHO LV POSTERIOR WALL: 1.56 CM (ref 0.6–1.1)
EST. GFR  (NO RACE VARIABLE): >60 ML/MIN/1.73 M^2
FRACTIONAL SHORTENING: 18 % (ref 28–44)
GLUCOSE SERPL-MCNC: 108 MG/DL (ref 70–110)
HCV AB SERPL QL IA: NEGATIVE
HIV 1+2 AB+HIV1 P24 AG SERPL QL IA: NEGATIVE
INTERVENTRICULAR SEPTUM: 1.46 CM (ref 0.6–1.1)
IVC DIAMETER: 1.44 CM
IVRT: 76.12 MSEC
LA MAJOR: 5.16 CM
LA MINOR: 5.28 CM
LA WIDTH: 3 CM
LEFT ATRIUM SIZE: 3.25 CM
LEFT ATRIUM VOLUME INDEX: 26.4 ML/M2
LEFT ATRIUM VOLUME: 43.26 CM3
LEFT INTERNAL DIMENSION IN SYSTOLE: 3.96 CM (ref 2.1–4)
LEFT VENTRICLE DIASTOLIC VOLUME INDEX: 67.12 ML/M2
LEFT VENTRICLE DIASTOLIC VOLUME: 110.08 ML
LEFT VENTRICLE MASS INDEX: 190 G/M2
LEFT VENTRICLE SYSTOLIC VOLUME INDEX: 41.6 ML/M2
LEFT VENTRICLE SYSTOLIC VOLUME: 68.3 ML
LEFT VENTRICULAR INTERNAL DIMENSION IN DIASTOLE: 4.85 CM (ref 3.5–6)
LEFT VENTRICULAR MASS: 311.23 G
LV LATERAL E/E' RATIO: 14.25 M/S
LV SEPTAL E/E' RATIO: 11.4 M/S
LVOT MG: 2.73 MMHG
LVOT MV: 0.79 CM/S
MAGNESIUM SERPL-MCNC: 1.7 MG/DL (ref 1.6–2.6)
MV PEAK A VEL: 0.86 M/S
MV PEAK E VEL: 0.57 M/S
MV STENOSIS PRESSURE HALF TIME: 65.99 MS
MV VALVE AREA P 1/2 METHOD: 3.33 CM2
PISA AR MAX VEL: 2.29 M/S
POTASSIUM SERPL-SCNC: 3.6 MMOL/L (ref 3.5–5.1)
PV MEAN GRADIENT: 2 MMHG
RA MAJOR: 4.11 CM
RA PRESSURE ESTIMATED: 3 MMHG
RA WIDTH: 2.8 CM
SODIUM SERPL-SCNC: 137 MMOL/L (ref 136–145)
STJ: 3.45 CM
TDI LATERAL: 0.04 M/S
TDI SEPTAL: 0.05 M/S
TDI: 0.05 M/S
TRICUSPID ANNULAR PLANE SYSTOLIC EXCURSION: 1.95 CM
TROPONIN I SERPL DL<=0.01 NG/ML-MCNC: 0.02 NG/ML (ref 0–0.03)
TROPONIN I SERPL DL<=0.01 NG/ML-MCNC: 0.03 NG/ML (ref 0–0.03)
Z-SCORE OF LEFT VENTRICULAR DIMENSION IN END DIASTOLE: 0.5
Z-SCORE OF LEFT VENTRICULAR DIMENSION IN END SYSTOLE: 2.57

## 2024-05-28 PROCEDURE — 84484 ASSAY OF TROPONIN QUANT: CPT | Mod: 91 | Performed by: NURSE PRACTITIONER

## 2024-05-28 PROCEDURE — 36415 COLL VENOUS BLD VENIPUNCTURE: CPT | Mod: XB | Performed by: FAMILY MEDICINE

## 2024-05-28 PROCEDURE — 99291 CRITICAL CARE FIRST HOUR: CPT | Mod: ER

## 2024-05-28 PROCEDURE — 83735 ASSAY OF MAGNESIUM: CPT | Performed by: PHYSICIAN ASSISTANT

## 2024-05-28 PROCEDURE — 11000001 HC ACUTE MED/SURG PRIVATE ROOM

## 2024-05-28 PROCEDURE — 25000003 PHARM REV CODE 250: Mod: ER | Performed by: EMERGENCY MEDICINE

## 2024-05-28 PROCEDURE — 63600175 PHARM REV CODE 636 W HCPCS: Performed by: INTERNAL MEDICINE

## 2024-05-28 PROCEDURE — 96372 THER/PROPH/DIAG INJ SC/IM: CPT | Performed by: NURSE PRACTITIONER

## 2024-05-28 PROCEDURE — 99223 1ST HOSP IP/OBS HIGH 75: CPT | Mod: 25,,, | Performed by: STUDENT IN AN ORGANIZED HEALTH CARE EDUCATION/TRAINING PROGRAM

## 2024-05-28 PROCEDURE — 36415 COLL VENOUS BLD VENIPUNCTURE: CPT | Mod: XB | Performed by: PHYSICIAN ASSISTANT

## 2024-05-28 PROCEDURE — 36415 COLL VENOUS BLD VENIPUNCTURE: CPT | Performed by: NURSE PRACTITIONER

## 2024-05-28 PROCEDURE — 25000003 PHARM REV CODE 250: Performed by: FAMILY MEDICINE

## 2024-05-28 PROCEDURE — 21400001 HC TELEMETRY ROOM

## 2024-05-28 PROCEDURE — 25000003 PHARM REV CODE 250: Performed by: PHYSICIAN ASSISTANT

## 2024-05-28 PROCEDURE — 63600175 PHARM REV CODE 636 W HCPCS: Performed by: NURSE PRACTITIONER

## 2024-05-28 PROCEDURE — 80048 BASIC METABOLIC PNL TOTAL CA: CPT | Performed by: FAMILY MEDICINE

## 2024-05-28 RX ORDER — LOSARTAN POTASSIUM 25 MG/1
25 TABLET ORAL DAILY
Status: DISCONTINUED | OUTPATIENT
Start: 2024-05-29 | End: 2024-05-31

## 2024-05-28 RX ORDER — PROMETHAZINE HYDROCHLORIDE 25 MG/1
25 TABLET ORAL EVERY 6 HOURS PRN
Status: DISCONTINUED | OUTPATIENT
Start: 2024-05-28 | End: 2024-05-31 | Stop reason: HOSPADM

## 2024-05-28 RX ORDER — POLYETHYLENE GLYCOL 3350 17 G/17G
17 POWDER, FOR SOLUTION ORAL DAILY PRN
Status: DISCONTINUED | OUTPATIENT
Start: 2024-05-28 | End: 2024-05-31 | Stop reason: HOSPADM

## 2024-05-28 RX ORDER — METOPROLOL SUCCINATE 25 MG/1
25 TABLET, EXTENDED RELEASE ORAL DAILY
Status: DISCONTINUED | OUTPATIENT
Start: 2024-05-29 | End: 2024-05-28

## 2024-05-28 RX ORDER — IBUPROFEN 200 MG
16 TABLET ORAL
Status: DISCONTINUED | OUTPATIENT
Start: 2024-05-28 | End: 2024-05-31 | Stop reason: HOSPADM

## 2024-05-28 RX ORDER — ACETAMINOPHEN 650 MG/1
650 SUPPOSITORY RECTAL EVERY 4 HOURS PRN
Status: DISCONTINUED | OUTPATIENT
Start: 2024-05-28 | End: 2024-05-31 | Stop reason: HOSPADM

## 2024-05-28 RX ORDER — NALOXONE HCL 0.4 MG/ML
0.02 VIAL (ML) INJECTION
Status: DISCONTINUED | OUTPATIENT
Start: 2024-05-28 | End: 2024-05-31 | Stop reason: HOSPADM

## 2024-05-28 RX ORDER — ACETAMINOPHEN 325 MG/1
650 TABLET ORAL EVERY 8 HOURS PRN
Status: DISCONTINUED | OUTPATIENT
Start: 2024-05-28 | End: 2024-05-31 | Stop reason: HOSPADM

## 2024-05-28 RX ORDER — ASPIRIN 81 MG/1
81 TABLET ORAL DAILY
Status: DISCONTINUED | OUTPATIENT
Start: 2024-05-29 | End: 2024-05-31 | Stop reason: HOSPADM

## 2024-05-28 RX ORDER — ENOXAPARIN SODIUM 100 MG/ML
40 INJECTION SUBCUTANEOUS EVERY 24 HOURS
Status: DISCONTINUED | OUTPATIENT
Start: 2024-05-28 | End: 2024-05-29

## 2024-05-28 RX ORDER — IBUPROFEN 200 MG
24 TABLET ORAL
Status: DISCONTINUED | OUTPATIENT
Start: 2024-05-28 | End: 2024-05-31 | Stop reason: HOSPADM

## 2024-05-28 RX ORDER — ALUMINUM HYDROXIDE, MAGNESIUM HYDROXIDE, AND SIMETHICONE 1200; 120; 1200 MG/30ML; MG/30ML; MG/30ML
30 SUSPENSION ORAL 4 TIMES DAILY PRN
Status: DISCONTINUED | OUTPATIENT
Start: 2024-05-28 | End: 2024-05-31 | Stop reason: HOSPADM

## 2024-05-28 RX ORDER — METOPROLOL SUCCINATE 25 MG/1
25 TABLET, EXTENDED RELEASE ORAL NIGHTLY
Status: DISCONTINUED | OUTPATIENT
Start: 2024-05-28 | End: 2024-05-30

## 2024-05-28 RX ORDER — FUROSEMIDE 40 MG/1
40 TABLET ORAL DAILY
Status: DISCONTINUED | OUTPATIENT
Start: 2024-05-28 | End: 2024-05-31 | Stop reason: HOSPADM

## 2024-05-28 RX ORDER — ONDANSETRON HYDROCHLORIDE 2 MG/ML
4 INJECTION, SOLUTION INTRAVENOUS EVERY 6 HOURS PRN
Status: DISCONTINUED | OUTPATIENT
Start: 2024-05-28 | End: 2024-05-31 | Stop reason: HOSPADM

## 2024-05-28 RX ORDER — GLUCAGON 1 MG
1 KIT INJECTION
Status: DISCONTINUED | OUTPATIENT
Start: 2024-05-28 | End: 2024-05-31 | Stop reason: HOSPADM

## 2024-05-28 RX ORDER — SODIUM CHLORIDE 0.9 % (FLUSH) 0.9 %
3 SYRINGE (ML) INJECTION EVERY 12 HOURS PRN
Status: DISCONTINUED | OUTPATIENT
Start: 2024-05-28 | End: 2024-05-31 | Stop reason: HOSPADM

## 2024-05-28 RX ORDER — HYDRALAZINE HYDROCHLORIDE 20 MG/ML
10 INJECTION INTRAMUSCULAR; INTRAVENOUS EVERY 8 HOURS PRN
Status: DISCONTINUED | OUTPATIENT
Start: 2024-05-28 | End: 2024-05-31 | Stop reason: HOSPADM

## 2024-05-28 RX ORDER — CLONIDINE HYDROCHLORIDE 0.2 MG/1
0.2 TABLET ORAL
Status: COMPLETED | OUTPATIENT
Start: 2024-05-28 | End: 2024-05-28

## 2024-05-28 RX ORDER — FUROSEMIDE 40 MG/1
40 TABLET ORAL DAILY
Status: DISCONTINUED | OUTPATIENT
Start: 2024-05-29 | End: 2024-05-28

## 2024-05-28 RX ORDER — SIMETHICONE 80 MG
1 TABLET,CHEWABLE ORAL 4 TIMES DAILY PRN
Status: DISCONTINUED | OUTPATIENT
Start: 2024-05-28 | End: 2024-05-31 | Stop reason: HOSPADM

## 2024-05-28 RX ORDER — TALC
6 POWDER (GRAM) TOPICAL NIGHTLY PRN
Status: DISCONTINUED | OUTPATIENT
Start: 2024-05-28 | End: 2024-05-31 | Stop reason: HOSPADM

## 2024-05-28 RX ADMIN — HYDRALAZINE HYDROCHLORIDE 10 MG: 20 INJECTION, SOLUTION INTRAMUSCULAR; INTRAVENOUS at 04:05

## 2024-05-28 RX ADMIN — FUROSEMIDE 40 MG: 40 TABLET ORAL at 05:05

## 2024-05-28 RX ADMIN — CLONIDINE HYDROCHLORIDE 0.2 MG: 0.2 TABLET ORAL at 12:05

## 2024-05-28 RX ADMIN — ENOXAPARIN SODIUM 40 MG: 40 INJECTION SUBCUTANEOUS at 04:05

## 2024-05-28 RX ADMIN — METOPROLOL SUCCINATE 25 MG: 25 TABLET, EXTENDED RELEASE ORAL at 08:05

## 2024-05-28 NOTE — ASSESSMENT & PLAN NOTE
Patient has a current diagnosis of hypertensive urgency (without evidence of end organ damage) which is controlled.  Latest blood pressure and vitals reviewed-   Temp:  [98.2 °F (36.8 °C)-98.7 °F (37.1 °C)]   Pulse:  []   Resp:  [15-24]   BP: (151-231)/()   SpO2:  [93 %-98 %] .   Patient currently off IV antihypertensives.   Home meds for hypertension were reviewed and noted below.   Hypertension Medications               hydrochlorothiazide (HYDRODIURIL) 25 MG tablet Take 25 mg by mouth once daily.            Medication adjustment for hospital antihypertensives is as follows- prn hydralazine and labetalol. Continue oral antihypertensives    Will aim for controlled BP reduction by medications noted above. Monitor and mitigate end organ damage as indicated.

## 2024-05-28 NOTE — ASSESSMENT & PLAN NOTE
-BP uncontrolled upon admission, in setting of medication non-compliance and recent intake of high salt foods  -Can utilize ARB or CCB, defer to hospital medicine  -TTE pending

## 2024-05-28 NOTE — HPI
Ms. Camara is a 53 year old female patient whose current medical conditions include HTN who was sent to [facility] ED from her PCP's office due to uncontrolled HTN. Patient became concerned her BP was high due to palpitations/rapid heart beat that onset yesterday AM. She checked her BP at her mother's house and noted it to be elevated at 200/100 with a pulse rate of 114, prompting her to make a same day appointment with her PCP where her BP continued to be extremely elevated (219/144 and 218/135). She denied any associated SOB, CP, LH, dizziness, headache, near syncope, or syncope. She did report indulging in excessive amounts of salty food at the [employer] festival over the weekend. Initial workup labs revealed hypokalemia (K 3.4), BNP of 333, and troponin of 0.018>0.031 and patient was subsequently admitted for further evaluation and treatment. Cardiology consulted to assist with management. Patient seen and examined today, resting in bed. Feeling better. No CV complaints. Denies CP/SOB. Previously prescribed anti-hypertensive medication but has not filled these since 2017. Chart reviewed. Troponin flat, 0.018>0.031>0.033>0.034. TTE pending. EKG reviewed, sinus tachycardia, underlying LVH.

## 2024-05-28 NOTE — CARE UPDATE
Admitted for hypertension emergency    Denies chest pain, dyspnea, lower extremity edema, nausea/vomiting   +palpitations  Good appetite  Complains of leg cramping    No acute distress  No respiratory distress, on room air  Regular heart rate, no murmur  Clear lungs on auscultation bilateral   No wheezing or rales  Soft abdomen, nontender to palpation  No lower extremity edema   AO3    Blood pressure improved  Cardiology consulted  Elevated troponin(s) resolved  Elevated bnp  Awaiting echocardiogram  Received mag sulfate for mg 1.4    Anticipate discharge 1-2 days

## 2024-05-28 NOTE — SUBJECTIVE & OBJECTIVE
Past Medical History:   Diagnosis Date    Abnormal Pap smear     Cryo 1992, history of warts, normal pap since    Hypertension        Past Surgical History:   Procedure Laterality Date    HYSTERECTOMY      RALH    polynidal cyst      tonsillectomy         Review of patient's allergies indicates:   Allergen Reactions    Demerol [meperidine] Nausea And Vomiting and Other (See Comments)     migraine       No current facility-administered medications on file prior to encounter.     Current Outpatient Medications on File Prior to Encounter   Medication Sig    clobetasol 0.05% (TEMOVATE) 0.05 % Oint As needed    hydrochlorothiazide (HYDRODIURIL) 25 MG tablet Take 25 mg by mouth once daily.    naproxen (NAPROSYN) 500 MG tablet Take 1 tablet (500 mg total) by mouth 2 (two) times daily with meals.    potassium chloride (KLOR-CON) 10 MEQ TbSR TAKE ONE TABLET THREE TIMES DAILY WITH FOOD     Family History       Problem Relation (Age of Onset)    Diabetes Maternal Grandmother, Mother          Tobacco Use    Smoking status: Every Day     Current packs/day: 1.00     Average packs/day: 1 pack/day for 21.0 years (21.0 ttl pk-yrs)     Types: Cigarettes    Smokeless tobacco: Never   Substance and Sexual Activity    Alcohol use: Yes     Comment: socially    Drug use: No    Sexual activity: Yes     Partners: Male     Birth control/protection: Surgical     Comment: mut monog     Review of Systems   Constitutional: Positive for malaise/fatigue.   HENT: Negative.     Eyes: Negative.    Cardiovascular:  Positive for irregular heartbeat and palpitations.   Respiratory: Negative.     Hematologic/Lymphatic: Negative.    Skin: Negative.    Musculoskeletal: Negative.    Gastrointestinal: Negative.    Genitourinary: Negative.    Neurological: Negative.    Psychiatric/Behavioral: Negative.     Allergic/Immunologic: Negative.      Objective:     Vital Signs (Most Recent):  Temp: 98.1 °F (36.7 °C) (05/28/24 0745)  Pulse: 87 (05/28/24 0745)  Resp:  18 (05/28/24 0409)  BP: (!) 151/99 (05/28/24 0745)  SpO2: 97 % (05/28/24 0745) Vital Signs (24h Range):  Temp:  [98.1 °F (36.7 °C)-98.7 °F (37.1 °C)] 98.1 °F (36.7 °C)  Pulse:  [] 87  Resp:  [15-24] 18  SpO2:  [93 %-98 %] 97 %  BP: (147-231)/() 151/99     Weight: 60 kg (132 lb 4.4 oz)  Body mass index is 22.71 kg/m².    SpO2: 97 %         Intake/Output Summary (Last 24 hours) at 5/28/2024 0846  Last data filed at 5/27/2024 2122  Gross per 24 hour   Intake 1150 ml   Output --   Net 1150 ml       Lines/Drains/Airways       Peripheral Intravenous Line  Duration                  Peripheral IV - Single Lumen 05/27/24 1819 20 G Right Forearm <1 day                     Physical Exam  Vitals and nursing note reviewed.   Constitutional:       General: She is not in acute distress.     Appearance: Normal appearance. She is well-developed. She is not diaphoretic.   HENT:      Head: Normocephalic and atraumatic.   Eyes:      General:         Right eye: No discharge.         Left eye: No discharge.      Pupils: Pupils are equal, round, and reactive to light.   Cardiovascular:      Rate and Rhythm: Normal rate and regular rhythm.      Pulses: Intact distal pulses.      Heart sounds: Normal heart sounds, S1 normal and S2 normal. No murmur heard.  Pulmonary:      Effort: Pulmonary effort is normal. No respiratory distress.      Breath sounds: Normal breath sounds.   Abdominal:      General: There is no distension.   Musculoskeletal:      Right lower leg: No edema.      Left lower leg: No edema.   Skin:     General: Skin is warm and dry.      Findings: No erythema.   Neurological:      General: No focal deficit present.      Mental Status: She is alert and oriented to person, place, and time.   Psychiatric:         Mood and Affect: Mood normal.         Behavior: Behavior normal.          Significant Labs: CMP   Recent Labs   Lab 05/27/24  1623 05/28/24  0520    137   K 3.4* 3.6   CL 98 101   CO2 22* 22*   *  108   BUN 12 10   CREATININE 0.9 0.8   CALCIUM 10.1 10.3   PROT 8.4  --    ALBUMIN 4.8  --    BILITOT 1.1*  --    ALKPHOS 85  --    AST 28  --    ALT 20  --    ANIONGAP 17* 14   , CBC   Recent Labs   Lab 05/27/24  1623   WBC 9.56   HGB 15.8   HCT 43.8      , Troponin   Recent Labs   Lab 05/27/24  2019 05/28/24  0143 05/28/24  0520   TROPONINI 0.031* 0.033* 0.024   , and All pertinent lab results from the last 24 hours have been reviewed.    Significant Imaging: Echocardiogram: Transthoracic echo (TTE) complete (Cupid Only): No results found for this or any previous visit., EKG: Reviewed, and X-Ray: CXR: X-Ray Chest 1 View (CXR): No results found for this visit on 05/27/24. and X-Ray Chest PA and Lateral (CXR): No results found for this visit on 05/27/24.

## 2024-05-28 NOTE — SUBJECTIVE & OBJECTIVE
Past Medical History:   Diagnosis Date    Abnormal Pap smear     Cryo 1992, history of warts, normal pap since    Hypertension        Past Surgical History:   Procedure Laterality Date    HYSTERECTOMY      RALH    polynidal cyst      tonsillectomy         Review of patient's allergies indicates:   Allergen Reactions    Demerol [meperidine] Nausea And Vomiting and Other (See Comments)     migraine       No current facility-administered medications on file prior to encounter.     Current Outpatient Medications on File Prior to Encounter   Medication Sig    clobetasol 0.05% (TEMOVATE) 0.05 % Oint As needed    hydrochlorothiazide (HYDRODIURIL) 25 MG tablet Take 25 mg by mouth once daily.    naproxen (NAPROSYN) 500 MG tablet Take 1 tablet (500 mg total) by mouth 2 (two) times daily with meals.    potassium chloride (KLOR-CON) 10 MEQ TbSR TAKE ONE TABLET THREE TIMES DAILY WITH FOOD     Family History       Problem Relation (Age of Onset)    Diabetes Maternal Grandmother, Mother          Tobacco Use    Smoking status: Every Day     Current packs/day: 1.00     Average packs/day: 1 pack/day for 21.0 years (21.0 ttl pk-yrs)     Types: Cigarettes    Smokeless tobacco: Never   Substance and Sexual Activity    Alcohol use: Yes     Comment: socially    Drug use: No    Sexual activity: Yes     Partners: Male     Birth control/protection: Surgical     Comment: mut monog     Review of Systems   Constitutional:  Negative for chills, diaphoresis, fatigue and fever.   Respiratory:  Negative for cough, shortness of breath, wheezing and stridor.    Cardiovascular:  Positive for palpitations. Negative for chest pain and leg swelling.   Gastrointestinal:  Negative for nausea and vomiting.   Neurological:  Negative for dizziness, light-headedness and headaches.   All other systems reviewed and are negative.    Objective:     Vital Signs (Most Recent):  Temp: 98.5 °F (36.9 °C) (05/28/24 0409)  Pulse: 91 (05/28/24 0409)  Resp: 18 (05/28/24  0409)  BP: (!) 147/78 (05/28/24 0409)  SpO2: 98 % (05/28/24 0409) Vital Signs (24h Range):  Temp:  [98.2 °F (36.8 °C)-98.7 °F (37.1 °C)] 98.5 °F (36.9 °C)  Pulse:  [] 91  Resp:  [15-24] 18  SpO2:  [93 %-98 %] 98 %  BP: (147-231)/() 147/78     Weight: 60 kg (132 lb 4.4 oz)  Body mass index is 22.71 kg/m².     Physical Exam  Vitals and nursing note reviewed.   Constitutional:       General: She is awake. She is not in acute distress.     Appearance: Normal appearance. She is well-developed and well-groomed. She is not ill-appearing, toxic-appearing or diaphoretic.   HENT:      Head: Normocephalic and atraumatic.   Eyes:      Extraocular Movements: Extraocular movements intact.      Conjunctiva/sclera: Conjunctivae normal.      Pupils: Pupils are equal, round, and reactive to light.   Cardiovascular:      Rate and Rhythm: Normal rate and regular rhythm.      Heart sounds: Murmur heard.   Pulmonary:      Effort: Pulmonary effort is normal.      Breath sounds: Normal breath sounds. No decreased breath sounds, wheezing, rhonchi or rales.   Abdominal:      General: Bowel sounds are normal.      Palpations: Abdomen is soft.      Tenderness: There is no abdominal tenderness.   Musculoskeletal:      Cervical back: Normal range of motion and neck supple.      Right lower leg: No edema.      Left lower leg: No edema.      Comments: 5/5 strength throughout   Skin:     General: Skin is warm and dry.      Capillary Refill: Capillary refill takes less than 2 seconds.   Neurological:      General: No focal deficit present.      Mental Status: She is alert and oriented to person, place, and time. Mental status is at baseline.      GCS: GCS eye subscore is 4. GCS verbal subscore is 5. GCS motor subscore is 6.      Cranial Nerves: Cranial nerves 2-12 are intact.      Sensory: Sensation is intact.      Motor: Motor function is intact.   Psychiatric:         Mood and Affect: Mood normal.         Speech: Speech normal.          Behavior: Behavior normal. Behavior is cooperative.              CRANIAL NERVES     CN III, IV, VI   Pupils are equal, round, and reactive to light.     LABS:  Recent Results (from the past 24 hour(s))   EKG 12-lead    Collection Time: 05/27/24  3:27 PM   Result Value Ref Range    QRS Duration 86 ms    OHS QTC Calculation 507 ms   Urinalysis, Reflex to Urine Culture Urine, Clean Catch    Collection Time: 05/27/24  4:14 PM    Specimen: Urine   Result Value Ref Range    Specimen UA Urine, Clean Catch     Color, UA Yellow Yellow, Straw, Cheryl    Appearance, UA Clear Clear    pH, UA 7.0 5.0 - 8.0    Specific Gravity, UA 1.010 1.005 - 1.030    Protein, UA Negative Negative    Glucose, UA Negative Negative    Ketones, UA Negative Negative    Bilirubin (UA) Negative Negative    Occult Blood UA Negative Negative    Nitrite, UA Negative Negative    Urobilinogen, UA Negative <2.0 EU/dL    Leukocytes, UA Negative Negative   Drug screen panel, emergency    Collection Time: 05/27/24  4:14 PM   Result Value Ref Range    Benzodiazepines Negative Negative    Methadone metabolites Negative Negative    Cocaine (Metab.) Negative Negative    Opiate Scrn, Ur Negative Negative    Barbiturate Screen, Ur Negative Negative    Amphetamine Screen, Ur Negative Negative    THC Negative Negative    Phencyclidine Negative Negative    Creatinine, Urine 17.0 15.0 - 325.0 mg/dL    Toxicology Information SEE COMMENT    HIV 1/2 Ag/Ab (4th Gen)    Collection Time: 05/27/24  4:23 PM   Result Value Ref Range    HIV 1/2 Ag/Ab Negative Negative   Hepatitis C Antibody    Collection Time: 05/27/24  4:23 PM   Result Value Ref Range    Hepatitis C Ab Negative Negative   HCV Virus Hold Specimen    Collection Time: 05/27/24  4:23 PM   Result Value Ref Range    HEP C Virus Hold Specimen Hold for HCV sendout    CBC auto differential    Collection Time: 05/27/24  4:23 PM   Result Value Ref Range    WBC 9.56 3.90 - 12.70 K/uL    RBC 4.44 4.00 - 5.40 M/uL     Hemoglobin 15.8 12.0 - 16.0 g/dL    Hematocrit 43.8 37.0 - 48.5 %    MCV 99 (H) 82 - 98 fL    MCH 35.6 (H) 27.0 - 31.0 pg    MCHC 36.1 (H) 32.0 - 36.0 g/dL    RDW 12.5 11.5 - 14.5 %    Platelets 233 150 - 450 K/uL    MPV 9.3 9.2 - 12.9 fL    Immature Granulocytes 0.2 0.0 - 0.5 %    Gran # (ANC) 6.3 1.8 - 7.7 K/uL    Immature Grans (Abs) 0.02 0.00 - 0.04 K/uL    Lymph # 2.6 1.0 - 4.8 K/uL    Mono # 0.6 0.3 - 1.0 K/uL    Eos # 0.1 0.0 - 0.5 K/uL    Baso # 0.03 0.00 - 0.20 K/uL    nRBC 0 0 /100 WBC    Gran % 65.4 38.0 - 73.0 %    Lymph % 27.6 18.0 - 48.0 %    Mono % 5.9 4.0 - 15.0 %    Eosinophil % 0.6 0.0 - 8.0 %    Basophil % 0.3 0.0 - 1.9 %    Differential Method Automated    Comprehensive metabolic panel    Collection Time: 05/27/24  4:23 PM   Result Value Ref Range    Sodium 137 136 - 145 mmol/L    Potassium 3.4 (L) 3.5 - 5.1 mmol/L    Chloride 98 95 - 110 mmol/L    CO2 22 (L) 23 - 29 mmol/L    Glucose 112 (H) 70 - 110 mg/dL    BUN 12 6 - 20 mg/dL    Creatinine 0.9 0.5 - 1.4 mg/dL    Calcium 10.1 8.7 - 10.5 mg/dL    Total Protein 8.4 6.0 - 8.4 g/dL    Albumin 4.8 3.5 - 5.2 g/dL    Total Bilirubin 1.1 (H) 0.1 - 1.0 mg/dL    Alkaline Phosphatase 85 55 - 135 U/L    AST 28 10 - 40 U/L    ALT 20 10 - 44 U/L    eGFR >60.0 >60 mL/min/1.73 m^2    Anion Gap 17 (H) 8 - 16 mmol/L   Troponin I #1    Collection Time: 05/27/24  4:23 PM   Result Value Ref Range    Troponin I 0.018 0.000 - 0.026 ng/mL   BNP    Collection Time: 05/27/24  4:23 PM   Result Value Ref Range     (H) 0 - 99 pg/mL   Magnesium    Collection Time: 05/27/24  4:23 PM   Result Value Ref Range    Magnesium 1.4 (L) 1.6 - 2.6 mg/dL   Ethanol    Collection Time: 05/27/24  4:23 PM   Result Value Ref Range    Alcohol, Serum <10 <10 mg/dL   D-Dimer, Quantitative    Collection Time: 05/27/24  4:49 PM   Result Value Ref Range    D-Dimer 0.39 <0.50 mg/L FEU   TSH    Collection Time: 05/27/24  4:49 PM   Result Value Ref Range    TSH 3.305 0.400 - 4.000 uIU/mL    Troponin I    Collection Time: 05/27/24  8:19 PM   Result Value Ref Range    Troponin I 0.031 (H) 0.000 - 0.026 ng/mL   Troponin I    Collection Time: 05/28/24  1:43 AM   Result Value Ref Range    Troponin I 0.033 (H) 0.000 - 0.026 ng/mL       RADIOLOGY  X-Ray Chest AP Portable    Result Date: 5/27/2024  EXAMINATION: XR CHEST AP PORTABLE CLINICAL HISTORY: Palpitations; TECHNIQUE: Single frontal view of the chest was performed. COMPARISON: None FINDINGS: ECG monitoring leads overlie the chest.The lungs are clear, with normal appearance of pulmonary vasculature and no pleural effusion or pneumothorax. The cardiac silhouette is prominent in size, likely accentuated by technique.  There is aortic calcification. No acute osseous abnormality.  There is degenerative change of the spine.     Cardiomegaly, likely accentuated by technique.  No acute pulmonary abnormality. Electronically signed by: Sherri Lee Date:    05/27/2024 Time:    16:36      EKG    MICROBIOLOGY    MDM     Amount and/or Complexity of Data Reviewed  Clinical lab tests: reviewed  Tests in the radiology section of CPT®: reviewed  Tests in the medicine section of CPT®: reviewed  Discussion of test results with the performing providers: yes  Decide to obtain previous medical records or to obtain history from someone other than the patient: yes  Obtain history from someone other than the patient: yes  Review and summarize past medical records: yes  Discuss the patient with other providers: yes  Independent visualization of images, tracings, or specimens: yes

## 2024-05-28 NOTE — ASSESSMENT & PLAN NOTE
Plan:  -tele monitoring  -trend troponin  -cards consult  -repeat EKG if warranted  -analgesics prn

## 2024-05-28 NOTE — HPI
Eloisa Camara is a 53 y.o. female with a PMH  has a past medical history of Abnormal Pap smear and Hypertension. Presents as a transfer from our [facility] facility for Hypertensive emergency and further cardiac workup for tachycardia/palpitations.  Patient reports she she is enjoying the [employer] festival at which time she ate 3 dozen oysters and had a few alcoholic beverages. Patient states that when she woke up this morning she felt her heart beating fast.  Patient's apple watch reported heart rate in the 140s.  Patient reports that her heart rate improved.  Patient states that she did have a she sees coffee with an extra shot earlier today states that she felt her symptoms return.  Patient proceeded to her mother's house to check her blood pressure which was elevated 200s/100s.  Patient proceeded to her PCP's office at which time the following findings were noted.  Patient was ultimately sent to the ER for further evaluation of hypertensive emergency and tachycardia.  See PCP note below.    Note Per Primary Care Provider:  The patient, a 53-year-old female, presents with her , reporting a sudden onset of increased blood pressure starting today. She describes the sensation of her heart racing, which prompted her concern for elevated blood pressure. Objective measurements taken in the clinic confirmed her blood pressure to be critically high at 219/144 mmHg initially, with a subsequent reading of 218/135 mmHg. Her heart rate was noted to be 116 bpm on both occasions. The patient denies any identifiable cause or precipitating factors for this episode. She reports no recent excessive caffeine intake, anxiety, or other stressors that could potentially explain this acute rise in blood pressure. Interestingly, the patient mentioned consuming three dozen oysters the day before presentation, though she denies any gastrointestinal symptoms such as abdominal pain, nausea, vomiting, or diarrhea, as well as denying any  symptoms of chest pain, weakness, or fever. Her past medical history is significant for hypertension, for which she was previously prescribed benazepril 20 mg (last filled in 2017) and hydrochlorothiazide 25 mg (last filled in 2019), but she reports not taking any antihypertensive medications recently. The patient's history of non-compliance with antihypertensive therapy and the sudden, severe elevation in blood pressure are of particular concern.     ER workup at outside facility mostly unremarkable with the exception to Elevated BNP of 333, initial troponin 0.018 with delta troponin of 0.031, TSH of 3.305, drug screen and alcohol serum negative, UA negative, chest x-ray revealing cardiomegaly, an EKG revealing sinus tachycardia with biatrial enlargement left axis deviation and LVH with a ventricular rate of 114 beats per minute and a QT/QTC of 368/507.  Potassium was 3.4 and was treated with 40 mEq of potassium p.o..  Patient's blood pressure reading upon arrival to the ER was 231/135 with heart rate of 119.  Patient received numerous doses of IV hydralazine and labetalol in addition to p.o. clonidine and Norvasc which improved blood pressure reading to 158/88.  Hospital Medicine consulted to admit patient for hypertensive emergency and further cardiac workup.  Patient in agree with the treatment plan.  Patient admitted under observation status.    PCP: Quintin Ying

## 2024-05-28 NOTE — PLAN OF CARE
AAOx4  Discussed plan of care with patient, verbalized understanding.  VS wnl except continued elevation of BP    Purposeful rounding q2h  Remains free of injuries/fall, fall precautions are in place.  Cardiac monitoring in use, box #[id number], NSR    Denies pain  Denies any additional needs at this time  Call light within reach, bed locked in lowest position     No IVFs  12 hour chart check complete  Will cont poc & monitor BP    Problem: Adult Inpatient Plan of Care  Goal: Patient-Specific Goal (Individualized)  Outcome: Progressing  Flowsheets (Taken 5/28/2024 0206)  Individualized Care Needs: updates on poc  Anxieties, Fears or Concerns: Figuring out what is the cause of this  Patient/Family-Specific Goals (Include Timeframe): Decreased BP

## 2024-05-28 NOTE — ASSESSMENT & PLAN NOTE
Rate controlled in the 80s.   Plan:  -tele monitoring  -repeat EKG if warranted  -prn rate controlling medication if needed  -echo

## 2024-05-28 NOTE — H&P
[facility] Medicine  History & Physical    Patient Name: Eloisa Camara  MRN: [id number]  Patient Class: OP- Observation  Admission Date: 5/27/2024  Attending Physician: London Zhang MD   Primary Care Provider: Quintin Ying MD         Patient information was obtained from patient, past medical records, and ER records.     Subjective:     Principal Problem:Hypertensive emergency    Chief Complaint:   Chief Complaint   Patient presents with    Hypertension     Elevated BP and feels heart racing today.        HPI: Eloisa Camara is a 53 y.o. female with a PMH  has a past medical history of Abnormal Pap smear and Hypertension. Presents as a transfer from our [facility] for Hypertensive emergency and further cardiac workup for tachycardia/palpitations.  Patient reports she she is enjoying the [employer] festival at which time she ate 3 dozen oysters and had a few alcoholic beverages. Patient states that when she woke up this morning she felt her heart beating fast.  Patient's apple watch reported heart rate in the 140s.  Patient reports that her heart rate improved.  Patient states that she did have a she sees coffee with an extra shot earlier today states that she felt her symptoms return.  Patient proceeded to her mother's house to check her blood pressure which was elevated 200s/100s.  Patient proceeded to her PCP's office at which time the following findings were noted.  Patient was ultimately sent to the ER for further evaluation of hypertensive emergency and tachycardia.  See PCP note below.    Note Per Primary Care Provider:  The patient, a 53-year-old female, presents with her , reporting a sudden onset of increased blood pressure starting today. She describes the sensation of her heart racing, which prompted her concern for elevated blood pressure. Objective measurements taken in the clinic confirmed her blood pressure to be critically high at 219/144 mmHg initially, with a  subsequent reading of 218/135 mmHg. Her heart rate was noted to be 116 bpm on both occasions. The patient denies any identifiable cause or precipitating factors for this episode. She reports no recent excessive caffeine intake, anxiety, or other stressors that could potentially explain this acute rise in blood pressure. Interestingly, the patient mentioned consuming three dozen oysters the day before presentation, though she denies any gastrointestinal symptoms such as abdominal pain, nausea, vomiting, or diarrhea, as well as denying any symptoms of chest pain, weakness, or fever. Her past medical history is significant for hypertension, for which she was previously prescribed benazepril 20 mg (last filled in 2017) and hydrochlorothiazide 25 mg (last filled in 2019), but she reports not taking any antihypertensive medications recently. The patient's history of non-compliance with antihypertensive therapy and the sudden, severe elevation in blood pressure are of particular concern.     ER workup at outside facility mostly unremarkable with the exception to Elevated BNP of 333, initial troponin 0.018 with delta troponin of 0.031, TSH of 3.305, drug screen and alcohol serum negative, UA negative, chest x-ray revealing cardiomegaly, an EKG revealing sinus tachycardia with biatrial enlargement left axis deviation and LVH with a ventricular rate of 114 beats per minute and a QT/QTC of 368/507.  Potassium was 3.4 and was treated with 40 mEq of potassium p.o..  Patient's blood pressure reading upon arrival to the ER was 231/135 with heart rate of 119.  Patient received numerous doses of IV hydralazine and labetalol in addition to p.o. clonidine and Norvasc which improved blood pressure reading to 158/88.  Hospital Medicine consulted to admit patient for hypertensive emergency and further cardiac workup.  Patient in agree with the treatment plan.  Patient admitted under observation status.    PCP: Quintin Ying  Medical History:   Diagnosis Date    Abnormal Pap smear     Cryo 1992, history of warts, normal pap since    Hypertension        Past Surgical History:   Procedure Laterality Date    HYSTERECTOMY      RALH    polynidal cyst      tonsillectomy         Review of patient's allergies indicates:   Allergen Reactions    Demerol [meperidine] Nausea And Vomiting and Other (See Comments)     migraine       No current facility-administered medications on file prior to encounter.     Current Outpatient Medications on File Prior to Encounter   Medication Sig    clobetasol 0.05% (TEMOVATE) 0.05 % Oint As needed    hydrochlorothiazide (HYDRODIURIL) 25 MG tablet Take 25 mg by mouth once daily.    naproxen (NAPROSYN) 500 MG tablet Take 1 tablet (500 mg total) by mouth 2 (two) times daily with meals.    potassium chloride (KLOR-CON) 10 MEQ TbSR TAKE ONE TABLET THREE TIMES DAILY WITH FOOD     Family History       Problem Relation (Age of Onset)    Diabetes Maternal Grandmother, Mother          Tobacco Use    Smoking status: Every Day     Current packs/day: 1.00     Average packs/day: 1 pack/day for 21.0 years (21.0 ttl pk-yrs)     Types: Cigarettes    Smokeless tobacco: Never   Substance and Sexual Activity    Alcohol use: Yes     Comment: socially    Drug use: No    Sexual activity: Yes     Partners: Male     Birth control/protection: Surgical     Comment: mut monog     Review of Systems   Constitutional:  Negative for chills, diaphoresis, fatigue and fever.   Respiratory:  Negative for cough, shortness of breath, wheezing and stridor.    Cardiovascular:  Positive for palpitations. Negative for chest pain and leg swelling.   Gastrointestinal:  Negative for nausea and vomiting.   Neurological:  Negative for dizziness, light-headedness and headaches.   All other systems reviewed and are negative.    Objective:     Vital Signs (Most Recent):  Temp: 98.5 °F (36.9 °C) (05/28/24 0409)  Pulse: 91 (05/28/24 0409)  Resp: 18 (05/28/24  0409)  BP: (!) 147/78 (05/28/24 0409)  SpO2: 98 % (05/28/24 0409) Vital Signs (24h Range):  Temp:  [98.2 °F (36.8 °C)-98.7 °F (37.1 °C)] 98.5 °F (36.9 °C)  Pulse:  [] 91  Resp:  [15-24] 18  SpO2:  [93 %-98 %] 98 %  BP: (147-231)/() 147/78     Weight: 60 kg (132 lb 4.4 oz)  Body mass index is 22.71 kg/m².     Physical Exam  Vitals and nursing note reviewed.   Constitutional:       General: She is awake. She is not in acute distress.     Appearance: Normal appearance. She is well-developed and well-groomed. She is not ill-appearing, toxic-appearing or diaphoretic.   HENT:      Head: Normocephalic and atraumatic.   Eyes:      Extraocular Movements: Extraocular movements intact.      Conjunctiva/sclera: Conjunctivae normal.      Pupils: Pupils are equal, round, and reactive to light.   Cardiovascular:      Rate and Rhythm: Normal rate and regular rhythm.      Heart sounds: Murmur heard.   Pulmonary:      Effort: Pulmonary effort is normal.      Breath sounds: Normal breath sounds. No decreased breath sounds, wheezing, rhonchi or rales.   Abdominal:      General: Bowel sounds are normal.      Palpations: Abdomen is soft.      Tenderness: There is no abdominal tenderness.   Musculoskeletal:      Cervical back: Normal range of motion and neck supple.      Right lower leg: No edema.      Left lower leg: No edema.      Comments: 5/5 strength throughout   Skin:     General: Skin is warm and dry.      Capillary Refill: Capillary refill takes less than 2 seconds.   Neurological:      General: No focal deficit present.      Mental Status: She is alert and oriented to person, place, and time. Mental status is at baseline.      GCS: GCS eye subscore is 4. GCS verbal subscore is 5. GCS motor subscore is 6.      Cranial Nerves: Cranial nerves 2-12 are intact.      Sensory: Sensation is intact.      Motor: Motor function is intact.   Psychiatric:         Mood and Affect: Mood normal.         Speech: Speech normal.          Behavior: Behavior normal. Behavior is cooperative.              CRANIAL NERVES     CN III, IV, VI   Pupils are equal, round, and reactive to light.     LABS:  Recent Results (from the past 24 hour(s))   EKG 12-lead    Collection Time: 05/27/24  3:27 PM   Result Value Ref Range    QRS Duration 86 ms    OHS QTC Calculation 507 ms   Urinalysis, Reflex to Urine Culture Urine, Clean Catch    Collection Time: 05/27/24  4:14 PM    Specimen: Urine   Result Value Ref Range    Specimen UA Urine, Clean Catch     Color, UA Yellow Yellow, Straw, Cheryl    Appearance, UA Clear Clear    pH, UA 7.0 5.0 - 8.0    Specific Gravity, UA 1.010 1.005 - 1.030    Protein, UA Negative Negative    Glucose, UA Negative Negative    Ketones, UA Negative Negative    Bilirubin (UA) Negative Negative    Occult Blood UA Negative Negative    Nitrite, UA Negative Negative    Urobilinogen, UA Negative <2.0 EU/dL    Leukocytes, UA Negative Negative   Drug screen panel, emergency    Collection Time: 05/27/24  4:14 PM   Result Value Ref Range    Benzodiazepines Negative Negative    Methadone metabolites Negative Negative    Cocaine (Metab.) Negative Negative    Opiate Scrn, Ur Negative Negative    Barbiturate Screen, Ur Negative Negative    Amphetamine Screen, Ur Negative Negative    THC Negative Negative    Phencyclidine Negative Negative    Creatinine, Urine 17.0 15.0 - 325.0 mg/dL    Toxicology Information SEE COMMENT    HIV 1/2 Ag/Ab (4th Gen)    Collection Time: 05/27/24  4:23 PM   Result Value Ref Range    HIV 1/2 Ag/Ab Negative Negative   Hepatitis C Antibody    Collection Time: 05/27/24  4:23 PM   Result Value Ref Range    Hepatitis C Ab Negative Negative   HCV Virus Hold Specimen    Collection Time: 05/27/24  4:23 PM   Result Value Ref Range    HEP C Virus Hold Specimen Hold for HCV sendout    CBC auto differential    Collection Time: 05/27/24  4:23 PM   Result Value Ref Range    WBC 9.56 3.90 - 12.70 K/uL    RBC 4.44 4.00 - 5.40 M/uL     Hemoglobin 15.8 12.0 - 16.0 g/dL    Hematocrit 43.8 37.0 - 48.5 %    MCV 99 (H) 82 - 98 fL    MCH 35.6 (H) 27.0 - 31.0 pg    MCHC 36.1 (H) 32.0 - 36.0 g/dL    RDW 12.5 11.5 - 14.5 %    Platelets 233 150 - 450 K/uL    MPV 9.3 9.2 - 12.9 fL    Immature Granulocytes 0.2 0.0 - 0.5 %    Gran # (ANC) 6.3 1.8 - 7.7 K/uL    Immature Grans (Abs) 0.02 0.00 - 0.04 K/uL    Lymph # 2.6 1.0 - 4.8 K/uL    Mono # 0.6 0.3 - 1.0 K/uL    Eos # 0.1 0.0 - 0.5 K/uL    Baso # 0.03 0.00 - 0.20 K/uL    nRBC 0 0 /100 WBC    Gran % 65.4 38.0 - 73.0 %    Lymph % 27.6 18.0 - 48.0 %    Mono % 5.9 4.0 - 15.0 %    Eosinophil % 0.6 0.0 - 8.0 %    Basophil % 0.3 0.0 - 1.9 %    Differential Method Automated    Comprehensive metabolic panel    Collection Time: 05/27/24  4:23 PM   Result Value Ref Range    Sodium 137 136 - 145 mmol/L    Potassium 3.4 (L) 3.5 - 5.1 mmol/L    Chloride 98 95 - 110 mmol/L    CO2 22 (L) 23 - 29 mmol/L    Glucose 112 (H) 70 - 110 mg/dL    BUN 12 6 - 20 mg/dL    Creatinine 0.9 0.5 - 1.4 mg/dL    Calcium 10.1 8.7 - 10.5 mg/dL    Total Protein 8.4 6.0 - 8.4 g/dL    Albumin 4.8 3.5 - 5.2 g/dL    Total Bilirubin 1.1 (H) 0.1 - 1.0 mg/dL    Alkaline Phosphatase 85 55 - 135 U/L    AST 28 10 - 40 U/L    ALT 20 10 - 44 U/L    eGFR >60.0 >60 mL/min/1.73 m^2    Anion Gap 17 (H) 8 - 16 mmol/L   Troponin I #1    Collection Time: 05/27/24  4:23 PM   Result Value Ref Range    Troponin I 0.018 0.000 - 0.026 ng/mL   BNP    Collection Time: 05/27/24  4:23 PM   Result Value Ref Range     (H) 0 - 99 pg/mL   Magnesium    Collection Time: 05/27/24  4:23 PM   Result Value Ref Range    Magnesium 1.4 (L) 1.6 - 2.6 mg/dL   Ethanol    Collection Time: 05/27/24  4:23 PM   Result Value Ref Range    Alcohol, Serum <10 <10 mg/dL   D-Dimer, Quantitative    Collection Time: 05/27/24  4:49 PM   Result Value Ref Range    D-Dimer 0.39 <0.50 mg/L FEU   TSH    Collection Time: 05/27/24  4:49 PM   Result Value Ref Range    TSH 3.305 0.400 - 4.000 uIU/mL    Troponin I    Collection Time: 05/27/24  8:19 PM   Result Value Ref Range    Troponin I 0.031 (H) 0.000 - 0.026 ng/mL   Troponin I    Collection Time: 05/28/24  1:43 AM   Result Value Ref Range    Troponin I 0.033 (H) 0.000 - 0.026 ng/mL       RADIOLOGY  X-Ray Chest AP Portable    Result Date: 5/27/2024  EXAMINATION: XR CHEST AP PORTABLE CLINICAL HISTORY: Palpitations; TECHNIQUE: Single frontal view of the chest was performed. COMPARISON: None FINDINGS: ECG monitoring leads overlie the chest.The lungs are clear, with normal appearance of pulmonary vasculature and no pleural effusion or pneumothorax. The cardiac silhouette is prominent in size, likely accentuated by technique.  There is aortic calcification. No acute osseous abnormality.  There is degenerative change of the spine.     Cardiomegaly, likely accentuated by technique.  No acute pulmonary abnormality. Electronically signed by: Sherri Lee Date:    05/27/2024 Time:    16:36      EKG    MICROBIOLOGY    MDM     Amount and/or Complexity of Data Reviewed  Clinical lab tests: reviewed  Tests in the radiology section of CPT®: reviewed  Tests in the medicine section of CPT®: reviewed  Discussion of test results with the performing providers: yes  Decide to obtain previous medical records or to obtain history from someone other than the patient: yes  Obtain history from someone other than the patient: yes  Review and summarize past medical records: yes  Discuss the patient with other providers: yes  Independent visualization of images, tracings, or specimens: yes        Assessment/Plan:     * Hypertensive emergency  Patient has a current diagnosis of hypertensive urgency (without evidence of end organ damage) which is controlled.  Latest blood pressure and vitals reviewed-   Temp:  [98.2 °F (36.8 °C)-98.7 °F (37.1 °C)]   Pulse:  []   Resp:  [15-24]   BP: (151-231)/()   SpO2:  [93 %-98 %] .   Patient currently off IV antihypertensives.   Home  meds for hypertension were reviewed and noted below.   Hypertension Medications               hydrochlorothiazide (HYDRODIURIL) 25 MG tablet Take 25 mg by mouth once daily.            Medication adjustment for hospital antihypertensives is as follows- prn hydralazine and labetalol. Continue oral antihypertensives    Will aim for controlled BP reduction by medications noted above. Monitor and mitigate end organ damage as indicated.    Ventricular tachycardia  Rate controlled in the 80s.   Plan:  -tele monitoring  -repeat EKG if warranted  -prn rate controlling medication if needed  -echo      Elevated troponin  Plan:  -tele monitoring  -trend troponin  -cards consult  -repeat EKG if warranted  -analgesics prn      Elevated brain natriuretic peptide (BNP) level  Plan:  -tele monitoring  -echo  -cards consult      Cardiomegaly  Plan:  -tele monitoring  -echo  -cards consult        VTE Risk Mitigation (From admission, onward)           Ordered     enoxaparin injection 40 mg  Daily         05/28/24 0137     IP VTE LOW RISK PATIENT  Once         05/28/24 0137     Place sequential compression device  Until discontinued         05/28/24 0137                     //Core Measures   -DVT proph: SCDs, lovenox  -Code status Full    -Surrogate:spouse    Components of this note were documented using a voice recognition system and are subject to errors not corrected at the time the document was proof read. Please contact the author for any clarifications.       Stephen Zhang NP  Department of Hospital Medicine  [facility]

## 2024-05-28 NOTE — PLAN OF CARE
[facility]  Discharge Assessment    Primary Care Provider: Quintin Ying MD     Discharge Assessment (most recent)       BRIEF DISCHARGE ASSESSMENT - 05/28/24 0956          Discharge Planning    Assessment Type Discharge Planning Brief Assessment     Resource/Environmental Concerns none     Support Systems Spouse/significant other     Equipment Currently Used at Home none     Current Living Arrangements home     Patient/Family Anticipates Transition to home     Patient/Family Anticipated Services at Transition none     DME Needed Upon Discharge  none     Discharge Plan A Home                   Patient did not wish or was not able to name a surrogate decision maker or provide an Advance Care Plan.

## 2024-05-28 NOTE — PLAN OF CARE
Discussed poc with pt, pt verbalized understanding    Purposeful rounding every 2hours    VS wnl  Cardiac monitoring in use, pt is NSR, tele monitor # [id number]  Fall precautions in place, remains injury free  Pt denies c/o pain and nausea       Bed locked at lowest position  Call light within reach    Chart check complete  Will cont with POC

## 2024-05-28 NOTE — ASSESSMENT & PLAN NOTE
-Troponin mildly elevated but flat, 0.018>0.031>0.033>0.024  -Elevation secondary to demand ischemia from uncontrolled BP  -ASA  -TTE pending  -If EF normal can f/u as OP

## 2024-05-28 NOTE — H&P (VIEW-ONLY)
[facility]  Cardiology  Consult Note    Patient Name: Eloisa Camara  MRN: [id number]  Admission Date: 5/27/2024  Hospital Length of Stay: 0 days  Code Status: Full Code   Attending Provider: Beny Olvera MD   Consulting Provider: Estela Lewis PA-C  Primary Care Physician: Quintin Ying MD  Principal Problem:Hypertensive emergency    Patient information was obtained from patient, past medical records, and ER records.     Inpatient consult to Cardiology  Consult performed by: Estela Lewis PA-C  Consult ordered by: Stephen Zhang NP        Subjective:     Chief Complaint:  Palpitations/HTN    HPI:   Ms. Camara is a 53 year old female patient whose current medical conditions include HTN who was sent to [facility] ED from her PCP's office due to uncontrolled HTN. Patient became concerned her BP was high due to palpitations/rapid heart beat that onset yesterday AM. She checked her BP at her mother's house and noted it to be elevated at 200/100 with a pulse rate of 114, prompting her to make a same day appointment with her PCP where her BP continued to be extremely elevated (219/144 and 218/135). She denied any associated SOB, CP, LH, dizziness, headache, near syncope, or syncope. She did report indulging in excessive amounts of salty food at the [employer] festival over the weekend. Initial workup labs revealed hypokalemia (K 3.4), BNP of 333, and troponin of 0.018>0.031 and patient was subsequently admitted for further evaluation and treatment. Cardiology consulted to assist with management. Patient seen and examined today, resting in bed. Feeling better. No CV complaints. Denies CP/SOB. Previously prescribed anti-hypertensive medication but has not filled these since 2017. Chart reviewed. Troponin flat, 0.018>0.031>0.033>0.034. TTE pending. EKG reviewed, sinus tachycardia, underlying LVH.    Past Medical History:   Diagnosis Date    Abnormal Pap smear     Cryo 1992, history of warts, normal pap since     Hypertension        Past Surgical History:   Procedure Laterality Date    HYSTERECTOMY      RALH    polynidal cyst      tonsillectomy         Review of patient's allergies indicates:   Allergen Reactions    Demerol [meperidine] Nausea And Vomiting and Other (See Comments)     migraine       No current facility-administered medications on file prior to encounter.     Current Outpatient Medications on File Prior to Encounter   Medication Sig    clobetasol 0.05% (TEMOVATE) 0.05 % Oint As needed    hydrochlorothiazide (HYDRODIURIL) 25 MG tablet Take 25 mg by mouth once daily.    naproxen (NAPROSYN) 500 MG tablet Take 1 tablet (500 mg total) by mouth 2 (two) times daily with meals.    potassium chloride (KLOR-CON) 10 MEQ TbSR TAKE ONE TABLET THREE TIMES DAILY WITH FOOD     Family History       Problem Relation (Age of Onset)    Diabetes Maternal Grandmother, Mother          Tobacco Use    Smoking status: Every Day     Current packs/day: 1.00     Average packs/day: 1 pack/day for 21.0 years (21.0 ttl pk-yrs)     Types: Cigarettes    Smokeless tobacco: Never   Substance and Sexual Activity    Alcohol use: Yes     Comment: socially    Drug use: No    Sexual activity: Yes     Partners: Male     Birth control/protection: Surgical     Comment: mut monog     Review of Systems   Constitutional: Positive for malaise/fatigue.   HENT: Negative.     Eyes: Negative.    Cardiovascular:  Positive for irregular heartbeat and palpitations.   Respiratory: Negative.     Hematologic/Lymphatic: Negative.    Skin: Negative.    Musculoskeletal: Negative.    Gastrointestinal: Negative.    Genitourinary: Negative.    Neurological: Negative.    Psychiatric/Behavioral: Negative.     Allergic/Immunologic: Negative.      Objective:     Vital Signs (Most Recent):  Temp: 98.1 °F (36.7 °C) (05/28/24 0745)  Pulse: 87 (05/28/24 0745)  Resp: 18 (05/28/24 0409)  BP: (!) 151/99 (05/28/24 0745)  SpO2: 97 % (05/28/24 0745) Vital Signs (24h Range):  Temp:   [98.1 °F (36.7 °C)-98.7 °F (37.1 °C)] 98.1 °F (36.7 °C)  Pulse:  [] 87  Resp:  [15-24] 18  SpO2:  [93 %-98 %] 97 %  BP: (147-231)/() 151/99     Weight: 60 kg (132 lb 4.4 oz)  Body mass index is 22.71 kg/m².    SpO2: 97 %         Intake/Output Summary (Last 24 hours) at 5/28/2024 0846  Last data filed at 5/27/2024 2122  Gross per 24 hour   Intake 1150 ml   Output --   Net 1150 ml       Lines/Drains/Airways       Peripheral Intravenous Line  Duration                  Peripheral IV - Single Lumen 05/27/24 1819 20 G Right Forearm <1 day                     Physical Exam  Vitals and nursing note reviewed.   Constitutional:       General: She is not in acute distress.     Appearance: Normal appearance. She is well-developed. She is not diaphoretic.   HENT:      Head: Normocephalic and atraumatic.   Eyes:      General:         Right eye: No discharge.         Left eye: No discharge.      Pupils: Pupils are equal, round, and reactive to light.   Cardiovascular:      Rate and Rhythm: Normal rate and regular rhythm.      Pulses: Intact distal pulses.      Heart sounds: Normal heart sounds, S1 normal and S2 normal. No murmur heard.  Pulmonary:      Effort: Pulmonary effort is normal. No respiratory distress.      Breath sounds: Normal breath sounds.   Abdominal:      General: There is no distension.   Musculoskeletal:      Right lower leg: No edema.      Left lower leg: No edema.   Skin:     General: Skin is warm and dry.      Findings: No erythema.   Neurological:      General: No focal deficit present.      Mental Status: She is alert and oriented to person, place, and time.   Psychiatric:         Mood and Affect: Mood normal.         Behavior: Behavior normal.          Significant Labs: CMP   Recent Labs   Lab 05/27/24  1623 05/28/24  0520    137   K 3.4* 3.6   CL 98 101   CO2 22* 22*   * 108   BUN 12 10   CREATININE 0.9 0.8   CALCIUM 10.1 10.3   PROT 8.4  --    ALBUMIN 4.8  --    BILITOT 1.1*  --     ALKPHOS 85  --    AST 28  --    ALT 20  --    ANIONGAP 17* 14   , CBC   Recent Labs   Lab 05/27/24  1623   WBC 9.56   HGB 15.8   HCT 43.8      , Troponin   Recent Labs   Lab 05/27/24  2019 05/28/24  0143 05/28/24  0520   TROPONINI 0.031* 0.033* 0.024   , and All pertinent lab results from the last 24 hours have been reviewed.    Significant Imaging: Echocardiogram: Transthoracic echo (TTE) complete (Cupid Only): No results found for this or any previous visit., EKG: Reviewed, and X-Ray: CXR: X-Ray Chest 1 View (CXR): No results found for this visit on 05/27/24. and X-Ray Chest PA and Lateral (CXR): No results found for this visit on 05/27/24.  Assessment and Plan:   Patient who presents with HTN emergency/troponin leak/palpitations. Troponin flat, secondary to demand ischemia. TTE pending. If EF normal, can f/u in clinic. Can utilize ARB or amlodipine for BP control. Discussed low salt diet.     * Hypertensive emergency  -BP uncontrolled upon admission, in setting of medication non-compliance and recent intake of high salt foods  -Can utilize ARB or CCB, defer to hospital medicine  -TTE pending    Elevated troponin  -Troponin mildly elevated but flat, 0.018>0.031>0.033>0.024  -Elevation secondary to demand ischemia from uncontrolled BP  -ASA  -TTE pending  -If EF normal can f/u as OP    Elevated brain natriuretic peptide (BNP) level  -Diurese  -TTE pending    Cardiomegaly  -TTE pending        VTE Risk Mitigation (From admission, onward)           Ordered     enoxaparin injection 40 mg  Daily         05/28/24 0137     IP VTE LOW RISK PATIENT  Once         05/28/24 0137     Place sequential compression device  Until discontinued         05/28/24 0137                    Thank you for your consult. I will follow-up with patient. Please contact us if you have any additional questions.    Estela Lewis PA-C  Cardiology   [facility]

## 2024-05-28 NOTE — CONSULTS
[facility]  Cardiology  Consult Note    Patient Name: Eloisa Camara  MRN: [id number]  Admission Date: 5/27/2024  Hospital Length of Stay: 0 days  Code Status: Full Code   Attending Provider: Beny Olvera MD   Consulting Provider: Estela Lewis PA-C  Primary Care Physician: Quintin Ying MD  Principal Problem:Hypertensive emergency    Patient information was obtained from patient, past medical records, and ER records.     Inpatient consult to Cardiology  Consult performed by: Estela Lewis PA-C  Consult ordered by: Stephen Zhang NP        Subjective:     Chief Complaint:  Palpitations/HTN    HPI:   Ms. Camara is a 53 year old female patient whose current medical conditions include HTN who was sent to [facility] ED from her PCP's office due to uncontrolled HTN. Patient became concerned her BP was high due to palpitations/rapid heart beat that onset yesterday AM. She checked her BP at her mother's house and noted it to be elevated at 200/100 with a pulse rate of 114, prompting her to make a same day appointment with her PCP where her BP continued to be extremely elevated (219/144 and 218/135). She denied any associated SOB, CP, LH, dizziness, headache, near syncope, or syncope. She did report indulging in excessive amounts of salty food at the [employer] festival over the weekend. Initial workup labs revealed hypokalemia (K 3.4), BNP of 333, and troponin of 0.018>0.031 and patient was subsequently admitted for further evaluation and treatment. Cardiology consulted to assist with management. Patient seen and examined today, resting in bed. Feeling better. No CV complaints. Denies CP/SOB. Previously prescribed anti-hypertensive medication but has not filled these since 2017. Chart reviewed. Troponin flat, 0.018>0.031>0.033>0.034. TTE pending. EKG reviewed, sinus tachycardia, underlying LVH.    Past Medical History:   Diagnosis Date    Abnormal Pap smear     Cryo 1992, history of warts, normal pap since     Hypertension        Past Surgical History:   Procedure Laterality Date    HYSTERECTOMY      RALH    polynidal cyst      tonsillectomy         Review of patient's allergies indicates:   Allergen Reactions    Demerol [meperidine] Nausea And Vomiting and Other (See Comments)     migraine       No current facility-administered medications on file prior to encounter.     Current Outpatient Medications on File Prior to Encounter   Medication Sig    clobetasol 0.05% (TEMOVATE) 0.05 % Oint As needed    hydrochlorothiazide (HYDRODIURIL) 25 MG tablet Take 25 mg by mouth once daily.    naproxen (NAPROSYN) 500 MG tablet Take 1 tablet (500 mg total) by mouth 2 (two) times daily with meals.    potassium chloride (KLOR-CON) 10 MEQ TbSR TAKE ONE TABLET THREE TIMES DAILY WITH FOOD     Family History       Problem Relation (Age of Onset)    Diabetes Maternal Grandmother, Mother          Tobacco Use    Smoking status: Every Day     Current packs/day: 1.00     Average packs/day: 1 pack/day for 21.0 years (21.0 ttl pk-yrs)     Types: Cigarettes    Smokeless tobacco: Never   Substance and Sexual Activity    Alcohol use: Yes     Comment: socially    Drug use: No    Sexual activity: Yes     Partners: Male     Birth control/protection: Surgical     Comment: mut monog     Review of Systems   Constitutional: Positive for malaise/fatigue.   HENT: Negative.     Eyes: Negative.    Cardiovascular:  Positive for irregular heartbeat and palpitations.   Respiratory: Negative.     Hematologic/Lymphatic: Negative.    Skin: Negative.    Musculoskeletal: Negative.    Gastrointestinal: Negative.    Genitourinary: Negative.    Neurological: Negative.    Psychiatric/Behavioral: Negative.     Allergic/Immunologic: Negative.      Objective:     Vital Signs (Most Recent):  Temp: 98.1 °F (36.7 °C) (05/28/24 0745)  Pulse: 87 (05/28/24 0745)  Resp: 18 (05/28/24 0409)  BP: (!) 151/99 (05/28/24 0745)  SpO2: 97 % (05/28/24 0745) Vital Signs (24h Range):  Temp:   [98.1 °F (36.7 °C)-98.7 °F (37.1 °C)] 98.1 °F (36.7 °C)  Pulse:  [] 87  Resp:  [15-24] 18  SpO2:  [93 %-98 %] 97 %  BP: (147-231)/() 151/99     Weight: 60 kg (132 lb 4.4 oz)  Body mass index is 22.71 kg/m².    SpO2: 97 %         Intake/Output Summary (Last 24 hours) at 5/28/2024 0846  Last data filed at 5/27/2024 2122  Gross per 24 hour   Intake 1150 ml   Output --   Net 1150 ml       Lines/Drains/Airways       Peripheral Intravenous Line  Duration                  Peripheral IV - Single Lumen 05/27/24 1819 20 G Right Forearm <1 day                     Physical Exam  Vitals and nursing note reviewed.   Constitutional:       General: She is not in acute distress.     Appearance: Normal appearance. She is well-developed. She is not diaphoretic.   HENT:      Head: Normocephalic and atraumatic.   Eyes:      General:         Right eye: No discharge.         Left eye: No discharge.      Pupils: Pupils are equal, round, and reactive to light.   Cardiovascular:      Rate and Rhythm: Normal rate and regular rhythm.      Pulses: Intact distal pulses.      Heart sounds: Normal heart sounds, S1 normal and S2 normal. No murmur heard.  Pulmonary:      Effort: Pulmonary effort is normal. No respiratory distress.      Breath sounds: Normal breath sounds.   Abdominal:      General: There is no distension.   Musculoskeletal:      Right lower leg: No edema.      Left lower leg: No edema.   Skin:     General: Skin is warm and dry.      Findings: No erythema.   Neurological:      General: No focal deficit present.      Mental Status: She is alert and oriented to person, place, and time.   Psychiatric:         Mood and Affect: Mood normal.         Behavior: Behavior normal.          Significant Labs: CMP   Recent Labs   Lab 05/27/24  1623 05/28/24  0520    137   K 3.4* 3.6   CL 98 101   CO2 22* 22*   * 108   BUN 12 10   CREATININE 0.9 0.8   CALCIUM 10.1 10.3   PROT 8.4  --    ALBUMIN 4.8  --    BILITOT 1.1*  --     ALKPHOS 85  --    AST 28  --    ALT 20  --    ANIONGAP 17* 14   , CBC   Recent Labs   Lab 05/27/24  1623   WBC 9.56   HGB 15.8   HCT 43.8      , Troponin   Recent Labs   Lab 05/27/24  2019 05/28/24  0143 05/28/24  0520   TROPONINI 0.031* 0.033* 0.024   , and All pertinent lab results from the last 24 hours have been reviewed.    Significant Imaging: Echocardiogram: Transthoracic echo (TTE) complete (Cupid Only): No results found for this or any previous visit., EKG: Reviewed, and X-Ray: CXR: X-Ray Chest 1 View (CXR): No results found for this visit on 05/27/24. and X-Ray Chest PA and Lateral (CXR): No results found for this visit on 05/27/24.  Assessment and Plan:   Patient who presents with HTN emergency/troponin leak/palpitations. Troponin flat, secondary to demand ischemia. TTE pending. If EF normal, can f/u in clinic. Can utilize ARB or amlodipine for BP control. Discussed low salt diet.     * Hypertensive emergency  -BP uncontrolled upon admission, in setting of medication non-compliance and recent intake of high salt foods  -Can utilize ARB or CCB, defer to hospital medicine  -TTE pending    Elevated troponin  -Troponin mildly elevated but flat, 0.018>0.031>0.033>0.024  -Elevation secondary to demand ischemia from uncontrolled BP  -ASA  -TTE pending  -If EF normal can f/u as OP    Elevated brain natriuretic peptide (BNP) level  -Diurese  -TTE pending    Cardiomegaly  -TTE pending        VTE Risk Mitigation (From admission, onward)           Ordered     enoxaparin injection 40 mg  Daily         05/28/24 0137     IP VTE LOW RISK PATIENT  Once         05/28/24 0137     Place sequential compression device  Until discontinued         05/28/24 0137                    Thank you for your consult. I will follow-up with patient. Please contact us if you have any additional questions.    Estela Lewis PA-C  Cardiology   [facility]

## 2024-05-29 PROBLEM — I21.4 NSTEMI (NON-ST ELEVATED MYOCARDIAL INFARCTION): Status: ACTIVE | Noted: 2024-05-29

## 2024-05-29 LAB
CATH EF QUANTITATIVE: 35 %
POC ACTIVATED CLOTTING TIME K: 220 SEC (ref 74–137)
POC ACTIVATED CLOTTING TIME K: 348 SEC (ref 74–137)
POC ACTIVATED CLOTTING TIME K: 379 SEC (ref 74–137)
POCT GLUCOSE: 149 MG/DL (ref 70–110)
POCT GLUCOSE: 159 MG/DL (ref 70–110)
SAMPLE: ABNORMAL
TROPONIN I SERPL DL<=0.01 NG/ML-MCNC: 0.1 NG/ML (ref 0–0.03)
TROPONIN I SERPL DL<=0.01 NG/ML-MCNC: 0.56 NG/ML (ref 0–0.03)

## 2024-05-29 PROCEDURE — 93460 R&L HRT ART/VENTRICLE ANGIO: CPT | Mod: 59 | Performed by: INTERNAL MEDICINE

## 2024-05-29 PROCEDURE — 99152 MOD SED SAME PHYS/QHP 5/>YRS: CPT | Performed by: INTERNAL MEDICINE

## 2024-05-29 PROCEDURE — C1725 CATH, TRANSLUMIN NON-LASER: HCPCS | Performed by: INTERNAL MEDICINE

## 2024-05-29 PROCEDURE — 92972 PERQ TRLUML CORONRY LITHOTRP: CPT | Mod: LD | Performed by: INTERNAL MEDICINE

## 2024-05-29 PROCEDURE — 63600175 PHARM REV CODE 636 W HCPCS: Performed by: NURSE PRACTITIONER

## 2024-05-29 PROCEDURE — 25000003 PHARM REV CODE 250: Performed by: PHYSICIAN ASSISTANT

## 2024-05-29 PROCEDURE — B2111ZZ FLUOROSCOPY OF MULTIPLE CORONARY ARTERIES USING LOW OSMOLAR CONTRAST: ICD-10-PCS | Performed by: INTERNAL MEDICINE

## 2024-05-29 PROCEDURE — C1887 CATHETER, GUIDING: HCPCS | Performed by: INTERNAL MEDICINE

## 2024-05-29 PROCEDURE — 99233 SBSQ HOSP IP/OBS HIGH 50: CPT | Mod: ,,, | Performed by: STUDENT IN AN ORGANIZED HEALTH CARE EDUCATION/TRAINING PROGRAM

## 2024-05-29 PROCEDURE — 63600175 PHARM REV CODE 636 W HCPCS: Mod: JZ,JG | Performed by: INTERNAL MEDICINE

## 2024-05-29 PROCEDURE — 25000003 PHARM REV CODE 250: Performed by: INTERNAL MEDICINE

## 2024-05-29 PROCEDURE — 27201423 OPTIME MED/SURG SUP & DEVICES STERILE SUPPLY: Performed by: INTERNAL MEDICINE

## 2024-05-29 PROCEDURE — 99152 MOD SED SAME PHYS/QHP 5/>YRS: CPT | Mod: ,,, | Performed by: INTERNAL MEDICINE

## 2024-05-29 PROCEDURE — C1751 CATH, INF, PER/CENT/MIDLINE: HCPCS | Performed by: INTERNAL MEDICINE

## 2024-05-29 PROCEDURE — C1894 INTRO/SHEATH, NON-LASER: HCPCS | Performed by: INTERNAL MEDICINE

## 2024-05-29 PROCEDURE — 93005 ELECTROCARDIOGRAM TRACING: CPT

## 2024-05-29 PROCEDURE — 25000003 PHARM REV CODE 250: Performed by: FAMILY MEDICINE

## 2024-05-29 PROCEDURE — 93010 ELECTROCARDIOGRAM REPORT: CPT | Mod: ,,, | Performed by: STUDENT IN AN ORGANIZED HEALTH CARE EDUCATION/TRAINING PROGRAM

## 2024-05-29 PROCEDURE — 84484 ASSAY OF TROPONIN QUANT: CPT | Mod: 91 | Performed by: INTERNAL MEDICINE

## 2024-05-29 PROCEDURE — C1874 STENT, COATED/COV W/DEL SYS: HCPCS | Performed by: INTERNAL MEDICINE

## 2024-05-29 PROCEDURE — 20000000 HC ICU ROOM

## 2024-05-29 PROCEDURE — C1761 OPTIME CATH, TRANSLUMINAL INTRAVASC LITHO, CORONARY: HCPCS | Performed by: INTERNAL MEDICINE

## 2024-05-29 PROCEDURE — 99153 MOD SED SAME PHYS/QHP EA: CPT | Performed by: INTERNAL MEDICINE

## 2024-05-29 PROCEDURE — 0270346 DILATION OF CORONARY ARTERY, ONE ARTERY, BIFURCATION, WITH DRUG-ELUTING INTRALUMINAL DEVICE, PERCUTANEOUS APPROACH: ICD-10-PCS | Performed by: INTERNAL MEDICINE

## 2024-05-29 PROCEDURE — 85347 COAGULATION TIME ACTIVATED: CPT | Performed by: INTERNAL MEDICINE

## 2024-05-29 PROCEDURE — 4A023N8 MEASUREMENT OF CARDIAC SAMPLING AND PRESSURE, BILATERAL, PERCUTANEOUS APPROACH: ICD-10-PCS | Performed by: INTERNAL MEDICINE

## 2024-05-29 PROCEDURE — 92928 PRQ TCAT PLMT NTRAC ST 1 LES: CPT | Mod: LD,,, | Performed by: INTERNAL MEDICINE

## 2024-05-29 PROCEDURE — 63600175 PHARM REV CODE 636 W HCPCS: Performed by: INTERNAL MEDICINE

## 2024-05-29 PROCEDURE — C1769 GUIDE WIRE: HCPCS | Performed by: INTERNAL MEDICINE

## 2024-05-29 PROCEDURE — 92972 PERQ TRLUML CORONRY LITHOTRP: CPT | Mod: ,,, | Performed by: INTERNAL MEDICINE

## 2024-05-29 PROCEDURE — 36415 COLL VENOUS BLD VENIPUNCTURE: CPT | Performed by: INTERNAL MEDICINE

## 2024-05-29 PROCEDURE — 93460 R&L HRT ART/VENTRICLE ANGIO: CPT | Mod: 26,59,51, | Performed by: INTERNAL MEDICINE

## 2024-05-29 PROCEDURE — C9600 PERC DRUG-EL COR STENT SING: HCPCS | Mod: LR | Performed by: INTERNAL MEDICINE

## 2024-05-29 PROCEDURE — 25500020 PHARM REV CODE 255: Performed by: INTERNAL MEDICINE

## 2024-05-29 DEVICE — EVEROLIMUS-ELUTING PLATINUM CHROMIUM CORONARY STENT SYSTEM
Type: IMPLANTABLE DEVICE | Site: HEART | Status: FUNCTIONAL
Brand: SYNERGY™ XD

## 2024-05-29 RX ORDER — SODIUM CHLORIDE 9 MG/ML
INJECTION, SOLUTION INTRAVENOUS CONTINUOUS
Status: DISCONTINUED | OUTPATIENT
Start: 2024-05-29 | End: 2024-05-29

## 2024-05-29 RX ORDER — PRASUGREL 10 MG/1
10 TABLET, FILM COATED ORAL DAILY
Status: DISCONTINUED | OUTPATIENT
Start: 2024-05-30 | End: 2024-05-31 | Stop reason: HOSPADM

## 2024-05-29 RX ORDER — ONDANSETRON HYDROCHLORIDE 2 MG/ML
INJECTION, SOLUTION INTRAVENOUS
Status: DISCONTINUED | OUTPATIENT
Start: 2024-05-29 | End: 2024-05-29 | Stop reason: HOSPADM

## 2024-05-29 RX ORDER — VERAPAMIL HYDROCHLORIDE 2.5 MG/ML
INJECTION, SOLUTION INTRAVENOUS
Status: DISCONTINUED | OUTPATIENT
Start: 2024-05-29 | End: 2024-05-29 | Stop reason: HOSPADM

## 2024-05-29 RX ORDER — LIDOCAINE HYDROCHLORIDE 20 MG/ML
INJECTION, SOLUTION EPIDURAL; INFILTRATION; INTRACAUDAL; PERINEURAL
Status: DISCONTINUED | OUTPATIENT
Start: 2024-05-29 | End: 2024-05-29 | Stop reason: HOSPADM

## 2024-05-29 RX ORDER — MIDAZOLAM HYDROCHLORIDE 1 MG/ML
INJECTION, SOLUTION INTRAMUSCULAR; INTRAVENOUS
Status: DISCONTINUED | OUTPATIENT
Start: 2024-05-29 | End: 2024-05-29 | Stop reason: HOSPADM

## 2024-05-29 RX ORDER — TIROFIBAN HYDROCHLORIDE 50 UG/ML
0.15 INJECTION INTRAVENOUS CONTINUOUS
Status: ACTIVE | OUTPATIENT
Start: 2024-05-29 | End: 2024-05-30

## 2024-05-29 RX ORDER — FAMOTIDINE 10 MG/ML
INJECTION INTRAVENOUS
Status: DISCONTINUED | OUTPATIENT
Start: 2024-05-29 | End: 2024-05-29 | Stop reason: HOSPADM

## 2024-05-29 RX ORDER — NITROGLYCERIN 5 MG/ML
INJECTION, SOLUTION INTRAVENOUS
Status: DISCONTINUED | OUTPATIENT
Start: 2024-05-29 | End: 2024-05-29 | Stop reason: HOSPADM

## 2024-05-29 RX ORDER — ATORVASTATIN CALCIUM 40 MG/1
80 TABLET, FILM COATED ORAL NIGHTLY
Status: DISCONTINUED | OUTPATIENT
Start: 2024-05-29 | End: 2024-05-31 | Stop reason: HOSPADM

## 2024-05-29 RX ORDER — EPINEPHRINE 0.1 MG/ML
INJECTION INTRAVENOUS
Status: DISCONTINUED | OUTPATIENT
Start: 2024-05-29 | End: 2024-05-29 | Stop reason: HOSPADM

## 2024-05-29 RX ORDER — FENTANYL CITRATE 50 UG/ML
INJECTION, SOLUTION INTRAMUSCULAR; INTRAVENOUS
Status: DISCONTINUED | OUTPATIENT
Start: 2024-05-29 | End: 2024-05-29 | Stop reason: HOSPADM

## 2024-05-29 RX ORDER — MUPIROCIN 20 MG/G
OINTMENT TOPICAL 2 TIMES DAILY
Status: DISCONTINUED | OUTPATIENT
Start: 2024-05-29 | End: 2024-05-31 | Stop reason: HOSPADM

## 2024-05-29 RX ORDER — HEPARIN SODIUM 1000 [USP'U]/ML
INJECTION, SOLUTION INTRAVENOUS; SUBCUTANEOUS
Status: DISCONTINUED | OUTPATIENT
Start: 2024-05-29 | End: 2024-05-29 | Stop reason: HOSPADM

## 2024-05-29 RX ORDER — NOREPINEPHRINE BITARTRATE/D5W 4MG/250ML
0-3 PLASTIC BAG, INJECTION (ML) INTRAVENOUS CONTINUOUS
Status: DISCONTINUED | OUTPATIENT
Start: 2024-05-29 | End: 2024-05-29

## 2024-05-29 RX ORDER — DIPHENHYDRAMINE HYDROCHLORIDE 50 MG/ML
INJECTION INTRAMUSCULAR; INTRAVENOUS
Status: DISCONTINUED | OUTPATIENT
Start: 2024-05-29 | End: 2024-05-29 | Stop reason: HOSPADM

## 2024-05-29 RX ORDER — PRASUGREL 10 MG/1
TABLET, FILM COATED ORAL
Status: DISCONTINUED | OUTPATIENT
Start: 2024-05-29 | End: 2024-05-29 | Stop reason: HOSPADM

## 2024-05-29 RX ORDER — MORPHINE SULFATE 2 MG/ML
2 INJECTION, SOLUTION INTRAMUSCULAR; INTRAVENOUS EVERY 4 HOURS PRN
Status: DISCONTINUED | OUTPATIENT
Start: 2024-05-29 | End: 2024-05-31 | Stop reason: HOSPADM

## 2024-05-29 RX ORDER — HYDROCODONE BITARTRATE AND ACETAMINOPHEN 5; 325 MG/1; MG/1
1 TABLET ORAL EVERY 4 HOURS PRN
Status: DISCONTINUED | OUTPATIENT
Start: 2024-05-29 | End: 2024-05-31 | Stop reason: HOSPADM

## 2024-05-29 RX ORDER — DOPAMINE HCL IN DEXTROSE 5 % 400MG/.25L
INFUSION BOTTLE (ML) INTRAVENOUS
Status: DISCONTINUED | OUTPATIENT
Start: 2024-05-29 | End: 2024-05-29

## 2024-05-29 RX ORDER — PRASUGREL 10 MG/1
60 TABLET, FILM COATED ORAL ONCE
Status: DISCONTINUED | OUTPATIENT
Start: 2024-05-29 | End: 2024-05-29 | Stop reason: ALTCHOICE

## 2024-05-29 RX ORDER — PHENYLEPHRINE HYDROCHLORIDE 10 MG/ML
INJECTION INTRAVENOUS
Status: DISCONTINUED | OUTPATIENT
Start: 2024-05-29 | End: 2024-05-29 | Stop reason: HOSPADM

## 2024-05-29 RX ORDER — NOREPINEPHRINE BITARTRATE 1 MG/ML
INJECTION, SOLUTION INTRAVENOUS
Status: DISCONTINUED | OUTPATIENT
Start: 2024-05-29 | End: 2024-05-29

## 2024-05-29 RX ORDER — SODIUM CHLORIDE 9 MG/ML
INJECTION, SOLUTION INTRAVENOUS CONTINUOUS
Status: ACTIVE | OUTPATIENT
Start: 2024-05-29 | End: 2024-05-30

## 2024-05-29 RX ADMIN — HYDRALAZINE HYDROCHLORIDE 10 MG: 20 INJECTION, SOLUTION INTRAMUSCULAR; INTRAVENOUS at 12:05

## 2024-05-29 RX ADMIN — TIROFIBAN 0.15 MCG/KG/MIN: 5 INJECTION, SOLUTION INTRAVENOUS at 12:05

## 2024-05-29 RX ADMIN — SODIUM CHLORIDE: 9 INJECTION, SOLUTION INTRAVENOUS at 12:05

## 2024-05-29 RX ADMIN — METOPROLOL SUCCINATE 25 MG: 25 TABLET, EXTENDED RELEASE ORAL at 08:05

## 2024-05-29 RX ADMIN — ATORVASTATIN CALCIUM 80 MG: 40 TABLET, FILM COATED ORAL at 08:05

## 2024-05-29 RX ADMIN — SODIUM CHLORIDE: 9 INJECTION, SOLUTION INTRAVENOUS at 09:05

## 2024-05-29 RX ADMIN — MUPIROCIN: 20 OINTMENT TOPICAL at 08:05

## 2024-05-29 RX ADMIN — NOREPINEPHRINE BITARTRATE 1 MCG/KG/MIN: 4 INJECTION, SOLUTION INTRAVENOUS at 11:05

## 2024-05-29 NOTE — ASSESSMENT & PLAN NOTE
Patient is identified as having Combined Systolic and Diastolic heart failure that is Acute. CHF is currently uncontrolled due to Continued edema of extremities. Latest ECHO performed and demonstrates- Results for orders placed during the hospital encounter of 05/27/24    Echo    Interpretation Summary    Left Ventricle: The left ventricle is normal in size. Normal wall thickness. There is concentric hypertrophy. Normal wall motion. There is moderately reduced systolic function with a visually estimated ejection fraction of 35 - 40%. Grade I diastolic dysfunction.    Right Ventricle: Normal right ventricular cavity size. Wall thickness is normal. Right ventricle wall motion  is normal. Systolic function is normal.    IVC/SVC: Normal venous pressure at 3 mmHg.  Recent Labs   Lab 05/27/24  1623   *   - mgmt per cards  - see plan for NSTEMI

## 2024-05-29 NOTE — HOSPITAL COURSE
admitted for new onset congestive heart failure. [facility] maternal grandmother  in 60s from congestive heart failure complications. Denies chest pain, dyspnea, nausea/vomiting. Only complaint is leg cramping. Awaiting heart cath per cardiology    status post pci with marleny placement in LAD. Cardiology recommending elective pci to LCX at later time. Transferring out of icu. Continue optimizing medication(s) and monitoring.      After discussing with cardiology, ok to discharge home. Prescriptions delivered to bedside. Questions and concerns addressed.     No acute distress. No respiratory distress. Normal weight. AO3. Dysphoric mood. Tearful     Patient seen and evaluated by me. Patient was determined to be suitable for discharge. Patient deemed stable for discharge to home with nurse practitioner to visit home program.

## 2024-05-29 NOTE — PLAN OF CARE
AAOx4  Discussed plan of care with patient, verbalized understanding.  VS wnl, BP remains in 160s     Purposeful rounding q2h  Remains free of injuries/fall, fall precautions are in place.  Cardiac monitoring in use, box #[id number], NSR     Denies pain  Denies any additional needs at this time  Call light within reach, bed locked in lowest position      No IVFs  12 hour chart check complete  Will cont poc & monitor BP

## 2024-05-29 NOTE — HPI
Year old female with a known past medical history of hypertension that presented to the ER from her PCP's office secondary to uncontrolled hypertension on 05/27.  Patient also reported palpitations and rapid heartbeat that began day prior to presentation.  Patient checked her blood pressure at home and noted it to be elevated at 2 100/100 with a heart rate of 114.  Initial workup in the ER revealed hypokalemia with a potassium of 3.4, BNP 3 3 3, and a troponin that was mildly elevated.  It was documented that patient used to be on blood pressure medicines but has not taken revealed though since 2017.  Patient was admitted to the [facility] for further evaluation and management.  Cardiology was consulted during this admission to assist.  During this admission has found that the patient has heart failure with the EF of 35-40%.  Patient was taken to the cath lab 5/29 with Dr. Mccoy and she was found to have a 99% block mid LAD which was successfully stented.  During the case it was reported that patient's blood pressure his systolic of the 60s where she required multiple pressors for some time and also had a reaction to contrast which she did not know she was allergic to.  He was also noted the patient has a lesion to her circ, cardiology reports they will address this at a later time, possibly even outpatient.  After cath lab patient was transferred to the ICU on Aggrastat infusion which is to run for 12 hours as well as Levophed for blood pressure support.  Critical Care Medicine consulted for assistance while patient to ICU.    At the time of my exam patient was quite drowsy from anesthesia as well as Benadryl that she was given in cath lab.  She does awakens easily but drifts off to sleep.  She was on low-dose Levophed via peripheral IV.  Later in the evening patient was seen again where she was awake eating dinner and Levophed is off.  Family at bedside and updates provided.

## 2024-05-29 NOTE — HOSPITAL COURSE
5/29/24-Patient seen and examined today, resting in bed. Feels well. No CP or SOB overnight. NO CV complaints. Labs reviewed/stable. TTE with EF of 35-40%, +WMA. R/LHC planned today.    5/30/24: PT SEEN AND EXAMINED IN ICU THIS AM. FEELS OK. OFF PRESSORS.  LABS REVIEWED.  DISCUSSED CAD W PT IN DETAIL.      5/31/24-Patient seen and examined today, sitting up in bed. Feels well, wants to go home. No CP or SOB. BP improved. Labs reviewed.

## 2024-05-29 NOTE — ASSESSMENT & PLAN NOTE
Patient is identified as having Combined Systolic and Diastolic heart failure that is Acute. CHF is currently controlled. Latest ECHO performed and demonstrates- Results for orders placed during the hospital encounter of 24    Echo    Interpretation Summary    Left Ventricle: The left ventricle is normal in size. Normal wall thickness. There is concentric hypertrophy. Normal wall motion. There is moderately reduced systolic function with a visually estimated ejection fraction of 35 - 40%. Grade I diastolic dysfunction.    Right Ventricle: Normal right ventricular cavity size. Wall thickness is normal. Right ventricle wall motion  is normal. Systolic function is normal.    IVC/SVC: Normal venous pressure at 3 mmHg.  . Continue Beta Blocker and Furosemide and monitor clinical status closely. Monitor on telemetry. Patient is off CHF pathway.  Monitor strict Is&Os and daily weights.  Place on fluid restriction of 1.5 L. Cardiology has been consulted. Continue to stress to patient importance of self efficacy and  on diet for CHF. Last BNP reviewed- and noted below   Recent Labs   Lab 24  1623   *     [facility] maternal grandmother  in 60s due to complications from congestive heart failure  Lasix and beta blocker started  Cardiology following  Awaiting cardiac cath

## 2024-05-29 NOTE — SUBJECTIVE & OBJECTIVE
Review of Systems   Constitutional: Negative.   HENT: Negative.     Eyes: Negative.    Cardiovascular: Negative.    Respiratory: Negative.     Endocrine: Negative.    Hematologic/Lymphatic: Negative.    Skin: Negative.    Musculoskeletal: Negative.    Gastrointestinal: Negative.    Genitourinary: Negative.    Neurological: Negative.    Psychiatric/Behavioral: Negative.     Allergic/Immunologic: Negative.      Objective:     Vital Signs (Most Recent):  Temp: 97.5 °F (36.4 °C) (05/29/24 1200)  Pulse: 89 (05/29/24 1245)  Resp: 14 (05/29/24 1245)  BP: 108/72 (05/29/24 1245)  SpO2: (!) 94 % (05/29/24 1245) Vital Signs (24h Range):  Temp:  [97.5 °F (36.4 °C)-98.7 °F (37.1 °C)] 97.5 °F (36.4 °C)  Pulse:  [] 89  Resp:  [14-68] 14  SpO2:  [92 %-99 %] 94 %  BP: (103-171)/(59-99) 108/72     Weight: 59.9 kg (132 lb)  Body mass index is 22.66 kg/m².     SpO2: (!) 94 %         Intake/Output Summary (Last 24 hours) at 5/29/2024 1308  Last data filed at 5/29/2024 1254  Gross per 24 hour   Intake 238.66 ml   Output --   Net 238.66 ml       Lines/Drains/Airways       Peripheral Intravenous Line  Duration                  Peripheral IV - Single Lumen 05/27/24 1819 20 G Right Forearm 1 day         Peripheral IV - Single Lumen 05/29/24 1144 20 G Anterior;Distal;Right Forearm <1 day                       Physical Exam  Vitals and nursing note reviewed.   Constitutional:       General: She is not in acute distress.     Appearance: Normal appearance. She is well-developed. She is not diaphoretic.   HENT:      Head: Normocephalic and atraumatic.   Eyes:      General:         Right eye: No discharge.         Left eye: No discharge.      Pupils: Pupils are equal, round, and reactive to light.   Cardiovascular:      Rate and Rhythm: Normal rate and regular rhythm.      Heart sounds: Normal heart sounds, S1 normal and S2 normal. No murmur heard.  Pulmonary:      Effort: Pulmonary effort is normal. No respiratory distress.      Breath  sounds: Normal breath sounds.   Abdominal:      General: There is no distension.   Musculoskeletal:      Right lower leg: No edema.      Left lower leg: No edema.   Skin:     General: Skin is warm and dry.      Findings: No erythema.   Neurological:      General: No focal deficit present.      Mental Status: She is alert and oriented to person, place, and time.   Psychiatric:         Mood and Affect: Mood normal.         Behavior: Behavior normal.            Significant Labs: CMP   Recent Labs   Lab 05/27/24  1623 05/28/24  0520    137   K 3.4* 3.6   CL 98 101   CO2 22* 22*   * 108   BUN 12 10   CREATININE 0.9 0.8   CALCIUM 10.1 10.3   PROT 8.4  --    ALBUMIN 4.8  --    BILITOT 1.1*  --    ALKPHOS 85  --    AST 28  --    ALT 20  --    ANIONGAP 17* 14   , CBC   Recent Labs   Lab 05/27/24  1623   WBC 9.56   HGB 15.8   HCT 43.8      , Troponin   Recent Labs   Lab 05/27/24  2019 05/28/24  0143 05/28/24  0520   TROPONINI 0.031* 0.033* 0.024   , and All pertinent lab results from the last 24 hours have been reviewed.    Significant Imaging: Echocardiogram: Transthoracic echo (TTE) complete (Cupid Only):   Results for orders placed or performed during the hospital encounter of 05/27/24   Echo   Result Value Ref Range    BSA 1.64 m2    LVOT stroke volume 54.11 cm3    LVIDd 4.85 3.5 - 6.0 cm    LV Systolic Volume 68.30 mL    LV Systolic Volume Index 41.6 mL/m2    LVIDs 3.96 2.1 - 4.0 cm    LV Diastolic Volume 110.08 mL    LV Diastolic Volume Index 67.12 mL/m2    IVS 1.46 (A) 0.6 - 1.1 cm    LVOT diameter 1.92 cm    LVOT area 2.9 cm2    FS 18 (A) 28 - 44 %    Left Ventricle Relative Wall Thickness 0.64 cm    Posterior Wall 1.56 (A) 0.6 - 1.1 cm    LV mass 311.23 g    LV Mass Index 190 g/m2    MV Peak E Masood 0.57 m/s    TDI LATERAL 0.04 m/s    TDI SEPTAL 0.05 m/s    E/E' ratio 12.67 m/s    MV Peak A Masood 0.86 m/s    E/A ratio 0.66     IVRT 76.12 msec    E wave deceleration time 227.55 msec    LV SEPTAL E/E'  RATIO 11.40 m/s    LV LATERAL E/E' RATIO 14.25 m/s    LVOT peak masood 0.99 m/s    Left Ventricular Outflow Tract Mean Velocity 0.79 cm/s    Left Ventricular Outflow Tract Mean Gradient 2.73 mmHg    RVOT peak VTI 16.6 cm    TAPSE 1.95 cm    LA size 3.25 cm    Left Atrium Minor Axis 5.28 cm    Left Atrium Major Axis 5.16 cm    RA Major Axis 4.11 cm    AV regurgitation pressure 1/2 time 1,[id number] ms    AR Max Masood 2.29 m/s    AV mean gradient 7 mmHg    AV peak gradient 12 mmHg    Ao peak masood 1.75 m/s    Ao VTI 29.40 cm    LVOT peak VTI 18.70 cm    AV valve area 1.84 cm²    AV Velocity Ratio 0.57     AV index (prosthetic) 0.64     MONIKA by Velocity Ratio 1.64 cm²    MV stenosis pressure 1/2 time 65.99 ms    MV valve area p 1/2 method 3.33 cm2    PV mean gradient 2 mmHg    RVOT peak masood 0.85 m/s    Ao root annulus 3.28 cm    STJ 3.45 cm    Ascending aorta 3.43 cm    IVC diameter 1.44 cm    Mean e' 0.05 m/s    ZLVIDS 2.57     ZLVIDD 0.50     LA Volume Index 26.4 mL/m2    LA volume 43.26 cm3    LA WIDTH 3.0 cm    RA Width 2.8 cm    Est. RA pres 3 mmHg    Narrative      Left Ventricle: The left ventricle is normal in size. Normal wall   thickness. There is concentric hypertrophy. Normal wall motion. There is   moderately reduced systolic function with a visually estimated ejection   fraction of 35 - 40%. Grade I diastolic dysfunction.    Right Ventricle: Normal right ventricular cavity size. Wall thickness   is normal. Right ventricle wall motion  is normal. Systolic function is   normal.    IVC/SVC: Normal venous pressure at 3 mmHg.     , EKG: Reviewed, and X-Ray: CXR: X-Ray Chest 1 View (CXR): No results found for this visit on 05/27/24. and X-Ray Chest PA and Lateral (CXR): No results found for this visit on 05/27/24.

## 2024-05-29 NOTE — ASSESSMENT & PLAN NOTE
Plan:  -tele monitoring  -trend troponin  -cards consult  -repeat EKG if warranted  -analgesics prn    Troponins trended down to within normal limits

## 2024-05-29 NOTE — ASSESSMENT & PLAN NOTE
-Diurese  -TTE pending    5/29/24  -TTE with EF of 35-40%  -R/LHC planned today  -Will optimize regimen post procedure

## 2024-05-29 NOTE — PLAN OF CARE
Pt arrived to ICU around noon. Slightly drowsy at the time, AAOx4 currently. No reports of chest pain or SOB since arrival to ICU. Saline and aggrastat infusions per order, see MAR. Levo infusing upon arrival from cath lab through PIV, drip has since been titrated off; slight tenderness and discoloration noted to site, providers aware. Pt with redness to face; BUE, and chest due to suspected iodine/contrast reaction in cath lab; treated per cath lab staff; c/o itching have subsided. All 14cc of air removed from L radial TR band; no signs of bleeding or hematoma; band in place for now in case air needs to be reinstilled. Pt and pt's family updated on status and plan of care.

## 2024-05-29 NOTE — ASSESSMENT & PLAN NOTE
Patient has a current diagnosis of hypertensive urgency (without evidence of end organ damage) which is controlled.  Latest blood pressure and vitals reviewed-   Temp:  [98 °F (36.7 °C)-98.7 °F (37.1 °C)]   Pulse:  [76-94]   Resp:  [18-20]   BP: (118-171)/(64-99)   SpO2:  [95 %-99 %] .   Patient currently off IV antihypertensives.   Home meds for hypertension were reviewed and noted below.   Hypertension Medications               hydrochlorothiazide (HYDRODIURIL) 25 MG tablet Take 25 mg by mouth once daily.            Medication adjustment for hospital antihypertensives is as follows- prn hydralazine and labetalol. Continue oral antihypertensives    Will aim for controlled BP reduction by medications noted above. Monitor and mitigate end organ damage as indicated.    Improved   Continue current regimen  Further cardiology recommendations pending cardiac cath

## 2024-05-29 NOTE — OP NOTE
[facility] - Cath Lab ([facility])  Cardiac Catheterization  Procedure Note    SUMMARY     Eloisa Camara  [id number]  Quintin Ying MD    Date of Procedure: 5/29/2024    Procedure: 1. LHC 2. LEFT VENTRICULOGRAM 3.  CORONARY ANGIOGRAM.  4. PCI MID LAD  5. RHC    Provider: Quintin Mccoy MD    Indications: She was referred for cardiac catheterization to further evaluate NSTEMI, new onset CHF.    Pre-Procedure Diagnosis: NSTEMI, new onset CHF.    Post-Procedure Diagnosis: PCI LAD    Anesthesia: RN IV Sedation    Description of the Findings of the Procedure:     The risks, benefits, complications, treatment options, and expected outcomes were discussed with the patient. The patient and/or family concurred with the proposed plan, giving informed consent. Patient was brought to the cath lab after IV hydration was begun and oral premedication was given.     Findings:    PCI performed of 99% heavy eccentric calcified mid LAD bifurcation stenosis with D3.  As soon as mid LAD lesion was wired, pt had abrupt closure of mid to distal LAD.  Aggrastat gtt started.  Pt was hypotensive, required dopamine, Levophed gtt support along w boluses of  Neosynephrine.  The mid LAD was treated initially with compliant balloon, then NC balloon.   Shockwave as initially going to be used but this approach was abandoned once LAD had abrupt closure.  JAYLEEN x 2 Synergy 2. 75 x 24 and 2.5 x 16 mm were deployed in overlapping manner and posted dilated with 3.0 mm NC balloon with 0% residual stenosis.  There was mild calcified ostial D3 stenosis bifurcation at the mid LAD stenosis site but this vessel remained patent during case.  Dopamine gtt weaned off at end of case, and pt transferred to ICU on low dose Levophed gtt with good BP at end of case.    LM: normal  LCX: Calcified, 70% proximal stenosis after a bend, 50% distal stenosis.  RCA: Calcified 50% mid stenosis.    EF 35 %  LVEDP 10    RHC:  RA 6  RV 29/7 (10)  PA 29/18 (23)  PCWP 11  CO 3.5 l/m    Left  radial TR band.  Left brachial venous manual sheath removal.    See report.            Complications:  None    Estimated Blood Loss (EBL): Minimal           Implants: JAYLEEN x 2    Specimens: none    Condition: stable    Disposition:  ICU stable on low dose pressor support    Attestation: I was present and scrubbed for the entire procedure.      Recommendations:    Tx to ICU.  Aggrastat gtt x 12 hours.  DAPT x one year.  OMT for CAD/CHF.  Consider elective PCI LCX.  Serial troponin levels.  F/u labs.  Smoking cessation.

## 2024-05-29 NOTE — SUBJECTIVE & OBJECTIVE
Interval History: See hospital course for today      Review of Systems   Constitutional:  Positive for appetite change (hungry). Negative for activity change, fatigue and fever.   Respiratory:  Negative for cough and shortness of breath.    Cardiovascular:  Positive for palpitations (improved). Negative for chest pain and leg swelling.   Gastrointestinal:  Negative for abdominal pain, nausea and vomiting.   Musculoskeletal:         Leg cramping   Neurological:  Negative for weakness.   Psychiatric/Behavioral:  Positive for agitation. Negative for behavioral problems, confusion, decreased concentration and dysphoric mood. The patient is not nervous/anxious.      Objective:     Vital Signs (Most Recent):  Temp: 98.3 °F (36.8 °C) (05/29/24 0713)  Pulse: 76 (05/29/24 0808)  Resp: 18 (05/29/24 0713)  BP: 136/84 (05/29/24 0713)  SpO2: 96 % (05/29/24 0713) Vital Signs (24h Range):  Temp:  [98 °F (36.7 °C)-98.7 °F (37.1 °C)] 98.3 °F (36.8 °C)  Pulse:  [76-94] 76  Resp:  [18-20] 18  SpO2:  [95 %-99 %] 96 %  BP: (118-171)/(64-99) 136/84     Weight: 59.9 kg (132 lb)  Body mass index is 22.66 kg/m².  No intake or output data in the 24 hours ending 05/29/24 1045      Physical Exam  Vitals and nursing note reviewed. Exam conducted with a chaperone present (visitor).   Constitutional:       General: She is not in acute distress.     Appearance: She is normal weight. She is ill-appearing. She is not toxic-appearing.   HENT:      Head: Normocephalic and atraumatic.   Cardiovascular:      Rate and Rhythm: Normal rate.   Pulmonary:      Effort: Pulmonary effort is normal. No respiratory distress.   Abdominal:      Palpations: Abdomen is soft.      Tenderness: There is no abdominal tenderness.   Musculoskeletal:      Right lower leg: No edema.      Left lower leg: No edema.   Skin:     General: Skin is warm.   Neurological:      Mental Status: She is alert and oriented to person, place, and time.      Motor: No weakness.   Psychiatric:          Speech: Speech normal.         Behavior: Behavior is agitated. Behavior is cooperative.             Significant Labs: All pertinent labs within the past 24 hours have been reviewed.  CBC:   Recent Labs   Lab 05/27/24  1623   WBC 9.56   HGB 15.8   HCT 43.8        CMP:   Recent Labs   Lab 05/27/24  1623 05/28/24  0520    137   K 3.4* 3.6   CL 98 101   CO2 22* 22*   * 108   BUN 12 10   CREATININE 0.9 0.8   CALCIUM 10.1 10.3   PROT 8.4  --    ALBUMIN 4.8  --    BILITOT 1.1*  --    ALKPHOS 85  --    AST 28  --    ALT 20  --    ANIONGAP 17* 14     Cardiac Markers:   Recent Labs   Lab 05/27/24  1623   *       Troponin:   Recent Labs   Lab 05/27/24  2019 05/28/24  0143 05/28/24  0520   TROPONINI 0.031* 0.033* 0.024     TSH:   Recent Labs   Lab 05/27/24  1649   TSH 3.305       Significant Imaging: I have reviewed all pertinent imaging results/findings within the past 24 hours.  CXR: I have reviewed all pertinent results/findings within the past 24 hours and my personal findings are:  cardiomegaly, no pulmonary abnormality  Echo: I have reviewed all pertinent results/findings within the past 24 hours and my personal findings are:  ejection fraction 35-40%  Cardiac cath pending

## 2024-05-29 NOTE — PROGRESS NOTES
[facility] ([facility] Medicine  Progress Note    Patient Name: Eloisa Camara  MRN: [id number]  Patient Class: IP- Inpatient   Admission Date: 5/27/2024  Length of Stay: 1 days  Attending Physician: Beny Olvera MD  Primary Care Provider: Quintin Ying MD        Subjective:     Principal Problem:New onset of congestive heart failure        HPI:  Eloisa Camara is a 53 y.o. female with a PMH  has a past medical history of Abnormal Pap smear and Hypertension. Presents as a transfer from our [facility] for Hypertensive emergency and further cardiac workup for tachycardia/palpitations.  Patient reports she she is enjoying the [email or web address] festival at which time she ate 3 dozen oysters and had a few alcoholic beverages. Patient states that when she woke up this morning she felt her heart beating fast.  Patient's apple watch reported heart rate in the 140s.  Patient reports that her heart rate improved.  Patient states that she did have a she sees coffee with an extra shot earlier today states that she felt her symptoms return.  Patient proceeded to her mother's house to check her blood pressure which was elevated 200s/100s.  Patient proceeded to her PCP's office at which time the following findings were noted.  Patient was ultimately sent to the ER for further evaluation of hypertensive emergency and tachycardia.  See PCP note below.    Note Per Primary Care Provider:  The patient, a 53-year-old female, presents with her , reporting a sudden onset of increased blood pressure starting today. She describes the sensation of her heart racing, which prompted her concern for elevated blood pressure. Objective measurements taken in the clinic confirmed her blood pressure to be critically high at 219/144 mmHg initially, with a subsequent reading of 218/135 mmHg. Her heart rate was noted to be 116 bpm on both occasions. The patient denies any identifiable cause or precipitating factors for this episode.  She reports no recent excessive caffeine intake, anxiety, or other stressors that could potentially explain this acute rise in blood pressure. Interestingly, the patient mentioned consuming three dozen oysters the day before presentation, though she denies any gastrointestinal symptoms such as abdominal pain, nausea, vomiting, or diarrhea, as well as denying any symptoms of chest pain, weakness, or fever. Her past medical history is significant for hypertension, for which she was previously prescribed benazepril 20 mg (last filled in ) and hydrochlorothiazide 25 mg (last filled in ), but she reports not taking any antihypertensive medications recently. The patient's history of non-compliance with antihypertensive therapy and the sudden, severe elevation in blood pressure are of particular concern.     ER workup at outside facility mostly unremarkable with the exception to Elevated BNP of 333, initial troponin 0.018 with delta troponin of 0.031, TSH of 3.305, drug screen and alcohol serum negative, UA negative, chest x-ray revealing cardiomegaly, an EKG revealing sinus tachycardia with biatrial enlargement left axis deviation and LVH with a ventricular rate of 114 beats per minute and a QT/QTC of 368/507.  Potassium was 3.4 and was treated with 40 mEq of potassium p.o..  Patient's blood pressure reading upon arrival to the ER was 231/135 with heart rate of 119.  Patient received numerous doses of IV hydralazine and labetalol in addition to p.o. clonidine and Norvasc which improved blood pressure reading to 158/88.  Hospital Medicine consulted to admit patient for hypertensive emergency and further cardiac workup.  Patient in agree with the treatment plan.  Patient admitted under observation status.    PCP: Quintin Ying       Overview/Hospital Course:   admitted for new onset congestive heart failure. [facility] maternal grandmother  in 60s from congestive heart failure complications. Denies chest pain,  dyspnea, nausea/vomiting. Only complaint is leg cramping. Awaiting heart cath per cardiology     Interval History: See hospital course for today      Review of Systems   Constitutional:  Positive for appetite change (hungry). Negative for activity change, fatigue and fever.   Respiratory:  Negative for cough and shortness of breath.    Cardiovascular:  Positive for palpitations (improved). Negative for chest pain and leg swelling.   Gastrointestinal:  Negative for abdominal pain, nausea and vomiting.   Musculoskeletal:         Leg cramping   Neurological:  Negative for weakness.   Psychiatric/Behavioral:  Positive for agitation. Negative for behavioral problems, confusion, decreased concentration and dysphoric mood. The patient is not nervous/anxious.      Objective:     Vital Signs (Most Recent):  Temp: 98.3 °F (36.8 °C) (05/29/24 0713)  Pulse: 76 (05/29/24 0808)  Resp: 18 (05/29/24 0713)  BP: 136/84 (05/29/24 0713)  SpO2: 96 % (05/29/24 0713) Vital Signs (24h Range):  Temp:  [98 °F (36.7 °C)-98.7 °F (37.1 °C)] 98.3 °F (36.8 °C)  Pulse:  [76-94] 76  Resp:  [18-20] 18  SpO2:  [95 %-99 %] 96 %  BP: (118-171)/(64-99) 136/84     Weight: 59.9 kg (132 lb)  Body mass index is 22.66 kg/m².  No intake or output data in the 24 hours ending 05/29/24 1045      Physical Exam  Vitals and nursing note reviewed. Exam conducted with a chaperone present (visitor).   Constitutional:       General: She is not in acute distress.     Appearance: She is normal weight. She is ill-appearing. She is not toxic-appearing.   HENT:      Head: Normocephalic and atraumatic.   Cardiovascular:      Rate and Rhythm: Normal rate.   Pulmonary:      Effort: Pulmonary effort is normal. No respiratory distress.   Abdominal:      Palpations: Abdomen is soft.      Tenderness: There is no abdominal tenderness.   Musculoskeletal:      Right lower leg: No edema.      Left lower leg: No edema.   Skin:     General: Skin is warm.   Neurological:      Mental  Status: She is alert and oriented to person, place, and time.      Motor: No weakness.   Psychiatric:         Speech: Speech normal.         Behavior: Behavior is agitated. Behavior is cooperative.             Significant Labs: All pertinent labs within the past 24 hours have been reviewed.  CBC:   Recent Labs   Lab 05/27/24  1623   WBC 9.56   HGB 15.8   HCT 43.8        CMP:   Recent Labs   Lab 05/27/24  1623 05/28/24  0520    137   K 3.4* 3.6   CL 98 101   CO2 22* 22*   * 108   BUN 12 10   CREATININE 0.9 0.8   CALCIUM 10.1 10.3   PROT 8.4  --    ALBUMIN 4.8  --    BILITOT 1.1*  --    ALKPHOS 85  --    AST 28  --    ALT 20  --    ANIONGAP 17* 14     Cardiac Markers:   Recent Labs   Lab 05/27/24  1623   *       Troponin:   Recent Labs   Lab 05/27/24  2019 05/28/24  0143 05/28/24  0520   TROPONINI 0.031* 0.033* 0.024     TSH:   Recent Labs   Lab 05/27/24  1649   TSH 3.305       Significant Imaging: I have reviewed all pertinent imaging results/findings within the past 24 hours.  CXR: I have reviewed all pertinent results/findings within the past 24 hours and my personal findings are:  cardiomegaly, no pulmonary abnormality  Echo: I have reviewed all pertinent results/findings within the past 24 hours and my personal findings are:  ejection fraction 35-40%  Cardiac cath pending    Assessment/Plan:      * New onset of congestive heart failure  Patient is identified as having Combined Systolic and Diastolic heart failure that is Acute. CHF is currently controlled. Latest ECHO performed and demonstrates- Results for orders placed during the hospital encounter of 05/27/24    Echo    Interpretation Summary    Left Ventricle: The left ventricle is normal in size. Normal wall thickness. There is concentric hypertrophy. Normal wall motion. There is moderately reduced systolic function with a visually estimated ejection fraction of 35 - 40%. Grade I diastolic dysfunction.    Right Ventricle: Normal  right ventricular cavity size. Wall thickness is normal. Right ventricle wall motion  is normal. Systolic function is normal.    IVC/SVC: Normal venous pressure at 3 mmHg.  . Continue Beta Blocker and Furosemide and monitor clinical status closely. Monitor on telemetry. Patient is off CHF pathway.  Monitor strict Is&Os and daily weights.  Place on fluid restriction of 1.5 L. Cardiology has been consulted. Continue to stress to patient importance of self efficacy and  on diet for CHF. Last BNP reviewed- and noted below   Recent Labs   Lab 24  1623   *     [facility] maternal grandmother  in 60s due to complications from congestive heart failure  Lasix and beta blocker started  Cardiology following  Awaiting cardiac cath    Elevated troponin  Plan:  -tele monitoring  -trend troponin  -cards consult  -repeat EKG if warranted  -analgesics prn    Troponins trended down to within normal limits     Elevated brain natriuretic peptide (BNP) level  Plan:  -tele monitoring  -echo  -cards consult    Lasix and beta blocker started     Cardiomegaly  Plan:  -tele monitoring  -echo  -cards consult    Awaiting heart cath for new onset congestive heart failure with reduced ejection fraction     Hypertensive emergency  Patient has a current diagnosis of hypertensive urgency (without evidence of end organ damage) which is controlled.  Latest blood pressure and vitals reviewed-   Temp:  [98 °F (36.7 °C)-98.7 °F (37.1 °C)]   Pulse:  [76-94]   Resp:  [18-20]   BP: (118-171)/(64-99)   SpO2:  [95 %-99 %] .   Patient currently off IV antihypertensives.   Home meds for hypertension were reviewed and noted below.   Hypertension Medications               hydrochlorothiazide (HYDRODIURIL) 25 MG tablet Take 25 mg by mouth once daily.            Medication adjustment for hospital antihypertensives is as follows- prn hydralazine and labetalol. Continue oral antihypertensives    Will aim for controlled BP reduction by  medications noted above. Monitor and mitigate end organ damage as indicated.    Improved   Continue current regimen  Further cardiology recommendations pending cardiac cath      VTE Risk Mitigation (From admission, onward)           Ordered     heparin (porcine) injection  As needed (PRN)         05/29/24 1028     enoxaparin injection 40 mg  Daily         05/28/24 0137     IP VTE LOW RISK PATIENT  Once         05/28/24 0137     Place sequential compression device  Until discontinued         05/28/24 0137                    Discharge Planning   MUMTAZ:      Code Status: Full Code   Is the patient medically ready for discharge?:     Reason for patient still in hospital (select all that apply): Patient new problem, Patient trending condition, Laboratory test, Treatment, Imaging, Consult recommendations, and Pending disposition  Discharge Plan A: Home                  Beny Olvera MD  Department of Hospital Medicine   [facility] ([facility])

## 2024-05-29 NOTE — ASSESSMENT & PLAN NOTE
-Troponin mildly elevated but flat, 0.018>0.031>0.033>0.024  -Elevation secondary to demand ischemia from uncontrolled BP  -ASA  -TTE pending  -If EF normal can f/u as OP    5/29/24  -Remains CP free  -TTE with EF of 35-40%  -Continue ASA, BB, ARB  -R/LHC today by Dr. Mccoy, further rec's to follow

## 2024-05-29 NOTE — ASSESSMENT & PLAN NOTE
Plan:  -tele monitoring  -echo  -cards consult    Awaiting heart cath for new onset congestive heart failure with reduced ejection fraction

## 2024-05-29 NOTE — NURSING
Pt received to ICU 8 from cath lab around noon. VS stable. Notable redness noted to pt's face, arms, and chest; pt c/o itching; s/p suspected contrast reaction that was treated per cath lab team, monitoring. Pt with levo drip infusing through peripheral IV, working to wean off.

## 2024-05-29 NOTE — INTERVAL H&P NOTE
The patient has been examined and the H&P has been reviewed:    I concur with the findings and changes have been noted since the H&P was written: LV DYSFUNCTION ON ECHO.  CARDIOLOGY SERVICE REQUESTS C/[facility] FOR FURTHER EVAL.    Anesthesia/Surgery risks, benefits and alternative options discussed and understood by patient/family.          Active Hospital Problems    Diagnosis  POA    *New onset of congestive heart failure [I50.9]  Yes    Hypertensive emergency [I16.1]  Yes    Cardiomegaly [I51.7]  Yes    Elevated brain natriuretic peptide (BNP) level [R79.89]  Yes    Elevated troponin [R79.89]  Yes      Resolved Hospital Problems    Diagnosis Date Resolved POA    Ventricular tachycardia [I47.20] 05/28/2024 Unknown

## 2024-05-29 NOTE — SUBJECTIVE & OBJECTIVE
Past Medical History:   Diagnosis Date    Abnormal Pap smear     Cryo 1992, history of warts, normal pap since    Hypertension        Past Surgical History:   Procedure Laterality Date    HYSTERECTOMY      RALH    polynidal cyst      tonsillectomy         Review of patient's allergies indicates:   Allergen Reactions    Demerol [meperidine] Nausea And Vomiting and Other (See Comments)     migraine    Iodine Itching       Family History       Problem Relation (Age of Onset)    Diabetes Maternal Grandmother, Mother          Tobacco Use    Smoking status: Every Day     Current packs/day: 1.00     Average packs/day: 1 pack/day for 21.0 years (21.0 ttl pk-yrs)     Types: Cigarettes    Smokeless tobacco: Never   Substance and Sexual Activity    Alcohol use: Yes     Comment: socially    Drug use: No    Sexual activity: Yes     Partners: Male     Birth control/protection: Surgical     Comment: mut monog         Review of Systems   Constitutional:  Positive for fatigue. Negative for fever.   Respiratory:  Negative for shortness of breath.    Cardiovascular:  Positive for leg swelling. Negative for chest pain.   All other systems reviewed and are negative.    Objective:     Vital Signs (Most Recent):  Temp: 98.1 °F (36.7 °C) (05/29/24 1501)  Pulse: 81 (05/29/24 1645)  Resp: 14 (05/29/24 1645)  BP: 103/72 (05/29/24 1645)  SpO2: 99 % (05/29/24 1645) Vital Signs (24h Range):  Temp:  [97.5 °F (36.4 °C)-98.3 °F (36.8 °C)] 98.1 °F (36.7 °C)  Pulse:  [] 81  Resp:  [13-68] 14  SpO2:  [92 %-100 %] 99 %  BP: ()/(51-84) 103/72     Weight: 59.9 kg (132 lb)  Body mass index is 22.66 kg/m².      Intake/Output Summary (Last 24 hours) at 5/29/2024 1659  Last data filed at 5/29/2024 1530  Gross per 24 hour   Intake 502.08 ml   Output --   Net 502.08 ml        Physical Exam  Vitals reviewed.   Constitutional:       General: She is awake. She is not in acute distress.     Appearance: She is well-developed.   Cardiovascular:      Rate  "and Rhythm: Normal rate.      Pulses:           Radial pulses are 1+ on the right side and 1+ on the left side.      Comments: Edema to BUE noted  Pulmonary:      Effort: Pulmonary effort is normal.      Breath sounds: Normal breath sounds.   Abdominal:      General: There is no distension.      Palpations: Abdomen is soft.   Musculoskeletal:      Comments: MARTÍNEZ   Skin:     General: Skin is warm and dry.   Neurological:      General: No focal deficit present.      Mental Status: She is alert.   Psychiatric:         Behavior: Behavior is cooperative.               Lines/Drains/Airways       Peripheral Intravenous Line  Duration                  Peripheral IV - Single Lumen 05/27/24 1819 20 G Right Forearm 1 day         Peripheral IV - Single Lumen 05/29/24 1144 20 G Anterior;Distal;Right Forearm <1 day                    Significant Labs:    CBC/Anemia Profile:  No results for input(s): "WBC", "HGB", "HCT", "PLT", "MCV", "RDW", "IRON", "FERRITIN", "RETIC", "FOLATE", "[id number]", "OCCULTBLOOD" in the last 48 hours.     Chemistries:  Recent Labs   Lab 05/28/24  0520 05/28/24  1337     --    K 3.6  --      --    CO2 22*  --    BUN 10  --    CREATININE 0.8  --    CALCIUM 10.3  --    MG  --  1.7       All pertinent labs within the past 24 hours have been reviewed.    Significant Imaging:   I have reviewed all pertinent imaging results/findings within the past 24 hours.  "

## 2024-05-29 NOTE — CONSULTS
[facility] - Intensive Care ([facility])  Critical Care Medicine  Consult Note    Patient Name: Eloisa Camara  MRN: [id number]  Admission Date: 5/27/2024  Hospital Length of Stay: 1 days  Code Status: Full Code  Attending Physician: Jhonny Su MD   Primary Care Provider: Quintin Ying MD   Principal Problem: NSTEMI (non-ST elevated myocardial infarction)    Consults  Subjective:     HPI:  Year old female with a known past medical history of hypertension that presented to the ER from her PCP's office secondary to uncontrolled hypertension on 05/27.  Patient also reported palpitations and rapid heartbeat that began day prior to presentation.  Patient checked her blood pressure at home and noted it to be elevated at 2 100/100 with a heart rate of 114.  Initial workup in the ER revealed hypokalemia with a potassium of 3.4, BNP 3 3 3, and a troponin that was mildly elevated.  It was documented that patient used to be on blood pressure medicines but has not taken revealed though since 2017.  Patient was admitted to the hospital for Hospital Medicine for further evaluation and management.  Cardiology was consulted during this admission to assist.  During this admission has found that the patient has heart failure with the EF of 35-40%.  Patient was taken to the cath lab 5/29 with Dr. Mccoy and she was found to have a 99% block mid LAD which was successfully stented.  During the case it was reported that patient's blood pressure his systolic of the 60s where she required multiple pressors for some time and also had a reaction to contrast which she did not know she was allergic to.  He was also noted the patient has a lesion to her circ, cardiology reports they will address this at a later time, possibly even outpatient.  After cath lab patient was transferred to the ICU on Aggrastat infusion which is to run for 12 hours as well as Levophed for blood pressure support.  Critical Care Medicine consulted for assistance  while patient to ICU.    At the time of my exam patient was quite drowsy from anesthesia as well as Benadryl that she was given in cath lab.  She does awakens easily but drifts off to sleep.  She was on low-dose Levophed via peripheral IV.  Later in the evening patient was seen again where she was awake eating dinner and Levophed is off.  Family at bedside and updates provided.    Hospital/ICU Course:  No notes on file    Past Medical History:   Diagnosis Date    Abnormal Pap smear     Cryo 1992, history of warts, normal pap since    Hypertension        Past Surgical History:   Procedure Laterality Date    HYSTERECTOMY      RALH    polynidal cyst      tonsillectomy         Review of patient's allergies indicates:   Allergen Reactions    Demerol [meperidine] Nausea And Vomiting and Other (See Comments)     migraine    Iodine Itching       Family History       Problem Relation (Age of Onset)    Diabetes Maternal Grandmother, Mother          Tobacco Use    Smoking status: Every Day     Current packs/day: 1.00     Average packs/day: 1 pack/day for 21.0 years (21.0 ttl pk-yrs)     Types: Cigarettes    Smokeless tobacco: Never   Substance and Sexual Activity    Alcohol use: Yes     Comment: socially    Drug use: No    Sexual activity: Yes     Partners: Male     Birth control/protection: Surgical     Comment: mut monog         Review of Systems   Constitutional:  Positive for fatigue. Negative for fever.   Respiratory:  Negative for shortness of breath.    Cardiovascular:  Positive for leg swelling. Negative for chest pain.   All other systems reviewed and are negative.    Objective:     Vital Signs (Most Recent):  Temp: 98.1 °F (36.7 °C) (05/29/24 1501)  Pulse: 81 (05/29/24 1645)  Resp: 14 (05/29/24 1645)  BP: 103/72 (05/29/24 1645)  SpO2: 99 % (05/29/24 1645) Vital Signs (24h Range):  Temp:  [97.5 °F (36.4 °C)-98.3 °F (36.8 °C)] 98.1 °F (36.7 °C)  Pulse:  [] 81  Resp:  [13-68] 14  SpO2:  [92 %-100 %] 99 %  BP:  "()/(51-84) 103/72     Weight: 59.9 kg (132 lb)  Body mass index is 22.66 kg/m².      Intake/Output Summary (Last 24 hours) at 5/29/2024 1659  Last data filed at 5/29/2024 1530  Gross per 24 hour   Intake 502.08 ml   Output --   Net 502.08 ml        Physical Exam  Vitals reviewed.   Constitutional:       General: She is awake. She is not in acute distress.     Appearance: She is well-developed.   Cardiovascular:      Rate and Rhythm: Normal rate.      Pulses:           Radial pulses are 1+ on the right side and 1+ on the left side.      Comments: Edema to BUE noted  Pulmonary:      Effort: Pulmonary effort is normal.      Breath sounds: Normal breath sounds.   Abdominal:      General: There is no distension.      Palpations: Abdomen is soft.   Musculoskeletal:      Comments: MARTÍNEZ   Skin:     General: Skin is warm and dry.   Neurological:      General: No focal deficit present.      Mental Status: She is alert.   Psychiatric:         Behavior: Behavior is cooperative.               Lines/Drains/Airways       Peripheral Intravenous Line  Duration                  Peripheral IV - Single Lumen 05/27/24 1819 20 G Right Forearm 1 day         Peripheral IV - Single Lumen 05/29/24 1144 20 G Anterior;Distal;Right Forearm <1 day                    Significant Labs:    CBC/Anemia Profile:  No results for input(s): "WBC", "HGB", "HCT", "PLT", "MCV", "RDW", "IRON", "FERRITIN", "RETIC", "FOLATE", "THPAYZAC48", "OCCULTBLOOD" in the last 48 hours.     Chemistries:  Recent Labs   Lab 05/28/24  0520 05/28/24  1337     --    K 3.6  --      --    CO2 22*  --    BUN 10  --    CREATININE 0.8  --    CALCIUM 10.3  --    MG  --  1.7       All pertinent labs within the past 24 hours have been reviewed.    Significant Imaging:   I have reviewed all pertinent imaging results/findings within the past 24 hours.    ABG  No results for input(s): "PH", "PO2", "PCO2", "HCO3", "BE" in the last 168 hours.  Assessment/Plan: "     Cardiac/Vascular  * NSTEMI (non-ST elevated myocardial infarction)  - mgmt per cards  - to cath lab today with JAYLEEN x2 to LAD  - aggrastat x12 hours  - DAPT for 1 year  - OMT for CAD/CHF  - possible future PCI to LCX, possibly outpt  - trend serial troponin  - telemetry     New onset of congestive heart failure  Patient is identified as having Combined Systolic and Diastolic heart failure that is Acute. CHF is currently uncontrolled due to Continued edema of extremities. Latest ECHO performed and demonstrates- Results for orders placed during the hospital encounter of 05/27/24    Echo    Interpretation Summary    Left Ventricle: The left ventricle is normal in size. Normal wall thickness. There is concentric hypertrophy. Normal wall motion. There is moderately reduced systolic function with a visually estimated ejection fraction of 35 - 40%. Grade I diastolic dysfunction.    Right Ventricle: Normal right ventricular cavity size. Wall thickness is normal. Right ventricle wall motion  is normal. Systolic function is normal.    IVC/SVC: Normal venous pressure at 3 mmHg.  Recent Labs   Lab 05/27/24  1623   *   - mgmt per cards  - see plan for NSTEMI    Elevated troponin  - see plan for NSTEMI    Elevated brain natriuretic peptide (BNP) level  - see plan for NSTEMI    Hypertensive emergency  - trends improved  - currently on BB, lasix, and losartan   - mgmt per cards     Other  Smoker  - cessation education  - nicotine patch if needed    Prophylaxis Measures:  GI ppx: Oral Diet  VTE ppx: SCDs  Glucose control: Monitor blood glucose    Code Status: Full Code    Critical Care Time: 38 minutes  Critical secondary to Patient has a condition that poses threat to life and bodily function: shock on levo after heart cath with PCI x2     Critical care was time spent personally by me on the following activities: development of treatment plan with patient or surrogate and bedside caregivers, discussions with consultants,  evaluation of patient's response to treatment, examination of patient, ordering and performing treatments and interventions, ordering and review of laboratory studies, ordering and review of radiographic studies, pulse oximetry, re-evaluation of patient's condition. This critical care time did not overlap with that of any other provider or involve time for any procedures.    Thank you for your consult. I will follow-up with patient. Please contact us if you have any additional questions.     Janes Astudillo NP  Critical Care Medicine  [facility] - Intensive Care ([facility])

## 2024-05-29 NOTE — PROGRESS NOTES
[facility] - Intensive Care ([facility])  Cardiology  Progress Note    Patient Name: Eloisa Camara  MRN: [id number]  Admission Date: 5/27/2024  Hospital Length of Stay: 1 days  Code Status: Full Code   Attending Physician: Beny Olvera MD   Primary Care Physician: Quintin Ying MD  Expected Discharge Date:   Principal Problem:NSTEMI (non-ST elevated myocardial infarction)    Subjective:   HPI:  Ms. Camara is a 53 year old female patient whose current medical conditions include HTN who was sent to [facility] ED from her PCP's office due to uncontrolled HTN. Patient became concerned her BP was high due to palpitations/rapid heart beat that onset yesterday AM. She checked her BP at her mother's house and noted it to be elevated at 200/100 with a pulse rate of 114, prompting her to make a same day appointment with her PCP where her BP continued to be extremely elevated (219/144 and 218/135). She denied any associated SOB, CP, LH, dizziness, headache, near syncope, or syncope. She did report indulging in excessive amounts of salty food at the [employer] festival over the weekend. Initial workup labs revealed hypokalemia (K 3.4), BNP of 333, and troponin of 0.018>0.031 and patient was subsequently admitted for further evaluation and treatment. Cardiology consulted to assist with management. Patient seen and examined today, resting in bed. Feeling better. No CV complaints. Denies CP/SOB. Previously prescribed anti-hypertensive medication but has not filled these since 2017. Chart reviewed. Troponin flat, 0.018>0.031>0.033>0.034. TTE pending. EKG reviewed, sinus tachycardia, underlying LVH.       Hospital Course:   5/29/24-Patient seen and examined today, resting in bed. Feels well. No CP or SOB overnight. NO CV complaints. Labs reviewed/stable. TTE with EF of 35-40%, +WMA. R/LHC planned today.      Review of Systems   Constitutional: Negative.   HENT: Negative.     Eyes: Negative.    Cardiovascular: Negative.    Respiratory:  Negative.     Endocrine: Negative.    Hematologic/Lymphatic: Negative.    Skin: Negative.    Musculoskeletal: Negative.    Gastrointestinal: Negative.    Genitourinary: Negative.    Neurological: Negative.    Psychiatric/Behavioral: Negative.     Allergic/Immunologic: Negative.      Objective:     Vital Signs (Most Recent):  Temp: 97.5 °F (36.4 °C) (05/29/24 1200)  Pulse: 89 (05/29/24 1245)  Resp: 14 (05/29/24 1245)  BP: 108/72 (05/29/24 1245)  SpO2: (!) 94 % (05/29/24 1245) Vital Signs (24h Range):  Temp:  [97.5 °F (36.4 °C)-98.7 °F (37.1 °C)] 97.5 °F (36.4 °C)  Pulse:  [] 89  Resp:  [14-68] 14  SpO2:  [92 %-99 %] 94 %  BP: (103-171)/(59-99) 108/72     Weight: 59.9 kg (132 lb)  Body mass index is 22.66 kg/m².     SpO2: (!) 94 %         Intake/Output Summary (Last 24 hours) at 5/29/2024 1308  Last data filed at 5/29/2024 1254  Gross per 24 hour   Intake 238.66 ml   Output --   Net 238.66 ml       Lines/Drains/Airways       Peripheral Intravenous Line  Duration                  Peripheral IV - Single Lumen 05/27/24 1819 20 G Right Forearm 1 day         Peripheral IV - Single Lumen 05/29/24 1144 20 G Anterior;Distal;Right Forearm <1 day                       Physical Exam  Vitals and nursing note reviewed.   Constitutional:       General: She is not in acute distress.     Appearance: Normal appearance. She is well-developed. She is not diaphoretic.   HENT:      Head: Normocephalic and atraumatic.   Eyes:      General:         Right eye: No discharge.         Left eye: No discharge.      Pupils: Pupils are equal, round, and reactive to light.   Cardiovascular:      Rate and Rhythm: Normal rate and regular rhythm.      Heart sounds: Normal heart sounds, S1 normal and S2 normal. No murmur heard.  Pulmonary:      Effort: Pulmonary effort is normal. No respiratory distress.      Breath sounds: Normal breath sounds.   Abdominal:      General: There is no distension.   Musculoskeletal:      Right lower leg: No edema.       Left lower leg: No edema.   Skin:     General: Skin is warm and dry.      Findings: No erythema.   Neurological:      General: No focal deficit present.      Mental Status: She is alert and oriented to person, place, and time.   Psychiatric:         Mood and Affect: Mood normal.         Behavior: Behavior normal.            Significant Labs: CMP   Recent Labs   Lab 05/27/24  1623 05/28/24  0520    137   K 3.4* 3.6   CL 98 101   CO2 22* 22*   * 108   BUN 12 10   CREATININE 0.9 0.8   CALCIUM 10.1 10.3   PROT 8.4  --    ALBUMIN 4.8  --    BILITOT 1.1*  --    ALKPHOS 85  --    AST 28  --    ALT 20  --    ANIONGAP 17* 14   , CBC   Recent Labs   Lab 05/27/24  1623   WBC 9.56   HGB 15.8   HCT 43.8      , Troponin   Recent Labs   Lab 05/27/24  2019 05/28/24  0143 05/28/24  0520   TROPONINI 0.031* 0.033* 0.024   , and All pertinent lab results from the last 24 hours have been reviewed.    Significant Imaging: Echocardiogram: Transthoracic echo (TTE) complete (Cupid Only):   Results for orders placed or performed during the hospital encounter of 05/27/24   Echo   Result Value Ref Range    BSA 1.64 m2    LVOT stroke volume 54.11 cm3    LVIDd 4.85 3.5 - 6.0 cm    LV Systolic Volume 68.30 mL    LV Systolic Volume Index 41.6 mL/m2    LVIDs 3.96 2.1 - 4.0 cm    LV Diastolic Volume 110.08 mL    LV Diastolic Volume Index 67.12 mL/m2    IVS 1.46 (A) 0.6 - 1.1 cm    LVOT diameter 1.92 cm    LVOT area 2.9 cm2    FS 18 (A) 28 - 44 %    Left Ventricle Relative Wall Thickness 0.64 cm    Posterior Wall 1.56 (A) 0.6 - 1.1 cm    LV mass 311.23 g    LV Mass Index 190 g/m2    MV Peak E Masood 0.57 m/s    TDI LATERAL 0.04 m/s    TDI SEPTAL 0.05 m/s    E/E' ratio 12.67 m/s    MV Peak A Masood 0.86 m/s    E/A ratio 0.66     IVRT 76.12 msec    E wave deceleration time 227.55 msec    LV SEPTAL E/E' RATIO 11.40 m/s    LV LATERAL E/E' RATIO 14.25 m/s    LVOT peak masood 0.99 m/s    Left Ventricular Outflow Tract Mean Velocity 0.79  cm/s    Left Ventricular Outflow Tract Mean Gradient 2.73 mmHg    RVOT peak VTI 16.6 cm    TAPSE 1.95 cm    LA size 3.25 cm    Left Atrium Minor Axis 5.28 cm    Left Atrium Major Axis 5.16 cm    RA Major Axis 4.11 cm    AV regurgitation pressure 1/2 time 1,106.850690553025519 ms    AR Max Masood 2.29 m/s    AV mean gradient 7 mmHg    AV peak gradient 12 mmHg    Ao peak masood 1.75 m/s    Ao VTI 29.40 cm    LVOT peak VTI 18.70 cm    AV valve area 1.84 cm²    AV Velocity Ratio 0.57     AV index (prosthetic) 0.64     MONIKA by Velocity Ratio 1.64 cm²    MV stenosis pressure 1/2 time 65.99 ms    MV valve area p 1/2 method 3.33 cm2    PV mean gradient 2 mmHg    RVOT peak masood 0.85 m/s    Ao root annulus 3.28 cm    STJ 3.45 cm    Ascending aorta 3.43 cm    IVC diameter 1.44 cm    Mean e' 0.05 m/s    ZLVIDS 2.57     ZLVIDD 0.50     LA Volume Index 26.4 mL/m2    LA volume 43.26 cm3    LA WIDTH 3.0 cm    RA Width 2.8 cm    Est. RA pres 3 mmHg    Narrative      Left Ventricle: The left ventricle is normal in size. Normal wall   thickness. There is concentric hypertrophy. Normal wall motion. There is   moderately reduced systolic function with a visually estimated ejection   fraction of 35 - 40%. Grade I diastolic dysfunction.    Right Ventricle: Normal right ventricular cavity size. Wall thickness   is normal. Right ventricle wall motion  is normal. Systolic function is   normal.    IVC/SVC: Normal venous pressure at 3 mmHg.     , EKG: Reviewed, and X-Ray: CXR: X-Ray Chest 1 View (CXR): No results found for this visit on 05/27/24. and X-Ray Chest PA and Lateral (CXR): No results found for this visit on 05/27/24.  Assessment and Plan:   Patient who presents with uncontrolled BP/elevated troponin/new onset CHF/cardiomyopathy. BP improved. R/LHC planned today by Dr. Mccoy, further rec's to follow.    New onset of congestive heart failure  -TTE with EF of 35-40%  -Continue po Lasis, ARB, BB  -R/LHC today    Elevated troponin  -Troponin  mildly elevated but flat, 0.018>0.031>0.033>0.024  -Elevation secondary to demand ischemia from uncontrolled BP  -ASA  -TTE pending  -If EF normal can f/u as OP    5/29/24  -Remains CP free  -TTE with EF of 35-40%  -Continue ASA, BB, ARB  -R/LHC today by Dr. Mccoy, further rec's to follow    Elevated brain natriuretic peptide (BNP) level  -Diurese  -TTE pending    5/29/24  -TTE with EF of 35-40%  -R/LHC planned today  -Will optimize regimen post procedure    Cardiomegaly  -TTE pending    Hypertensive emergency  -BP uncontrolled upon admission, in setting of medication non-compliance and recent intake of high salt foods  -Can utilize ARB or CCB, defer to hospital medicine  -TTE pending    5/29/24  -BP improved, continue BB, ARB, po Lasix  -TTE with EF of 35-40%  -R/LHC planned today        VTE Risk Mitigation (From admission, onward)           Ordered     IP VTE LOW RISK PATIENT  Once         05/28/24 0137     Place sequential compression device  Until discontinued         05/28/24 0137                    Estela Lewis PA-C  Cardiology  [facility] - Intensive Care ([facility])

## 2024-05-29 NOTE — ASSESSMENT & PLAN NOTE
-BP uncontrolled upon admission, in setting of medication non-compliance and recent intake of high salt foods  -Can utilize ARB or CCB, defer to hospital medicine  -TTE pending    5/29/24  -BP improved, continue BB, ARB, po Lasix  -TTE with EF of 35-40%  -R/LHC planned today

## 2024-05-30 PROBLEM — I25.10 CAD, MULTIPLE VESSEL: Status: ACTIVE | Noted: 2024-05-30

## 2024-05-30 LAB
ALBUMIN SERPL BCP-MCNC: 3.4 G/DL (ref 3.5–5.2)
ALP SERPL-CCNC: 48 U/L (ref 55–135)
ALT SERPL W/O P-5'-P-CCNC: 12 U/L (ref 10–44)
ANION GAP SERPL CALC-SCNC: 11 MMOL/L (ref 8–16)
AST SERPL-CCNC: 22 U/L (ref 10–40)
BASOPHILS # BLD AUTO: 0.02 K/UL (ref 0–0.2)
BASOPHILS NFR BLD: 0.1 % (ref 0–1.9)
BILIRUB SERPL-MCNC: 1.4 MG/DL (ref 0.1–1)
BUN SERPL-MCNC: 24 MG/DL (ref 6–20)
CALCIUM SERPL-MCNC: 8.9 MG/DL (ref 8.7–10.5)
CHLORIDE SERPL-SCNC: 104 MMOL/L (ref 95–110)
CO2 SERPL-SCNC: 20 MMOL/L (ref 23–29)
CREAT SERPL-MCNC: 0.9 MG/DL (ref 0.5–1.4)
DIFFERENTIAL METHOD BLD: ABNORMAL
EOSINOPHIL # BLD AUTO: 0 K/UL (ref 0–0.5)
EOSINOPHIL NFR BLD: 0.2 % (ref 0–8)
ERYTHROCYTE [DISTWIDTH] IN BLOOD BY AUTOMATED COUNT: 12.2 % (ref 11.5–14.5)
EST. GFR  (NO RACE VARIABLE): >60 ML/MIN/1.73 M^2
GLUCOSE SERPL-MCNC: 104 MG/DL (ref 70–110)
HCT VFR BLD AUTO: 37.6 % (ref 37–48.5)
HGB BLD-MCNC: 13.3 G/DL (ref 12–16)
IMM GRANULOCYTES # BLD AUTO: 0.08 K/UL (ref 0–0.04)
IMM GRANULOCYTES NFR BLD AUTO: 0.6 % (ref 0–0.5)
LYMPHOCYTES # BLD AUTO: 1.7 K/UL (ref 1–4.8)
LYMPHOCYTES NFR BLD: 12.4 % (ref 18–48)
MAGNESIUM SERPL-MCNC: 1.5 MG/DL (ref 1.6–2.6)
MCH RBC QN AUTO: 35.4 PG (ref 27–31)
MCHC RBC AUTO-ENTMCNC: 35.4 G/DL (ref 32–36)
MCV RBC AUTO: 100 FL (ref 82–98)
MONOCYTES # BLD AUTO: 0.9 K/UL (ref 0.3–1)
MONOCYTES NFR BLD: 6.5 % (ref 4–15)
NEUTROPHILS # BLD AUTO: 10.8 K/UL (ref 1.8–7.7)
NEUTROPHILS NFR BLD: 80.2 % (ref 38–73)
NRBC BLD-RTO: 0 /100 WBC
OHS QRS DURATION: 84 MS
OHS QTC CALCULATION: 554 MS
PHOSPHATE SERPL-MCNC: 4 MG/DL (ref 2.7–4.5)
PLATELET # BLD AUTO: 190 K/UL (ref 150–450)
PMV BLD AUTO: 9.8 FL (ref 9.2–12.9)
POCT GLUCOSE: 158 MG/DL (ref 70–110)
POCT GLUCOSE: 98 MG/DL (ref 70–110)
POCT GLUCOSE: 99 MG/DL (ref 70–110)
POCT GLUCOSE: 99 MG/DL (ref 70–110)
POTASSIUM SERPL-SCNC: 3.7 MMOL/L (ref 3.5–5.1)
PROT SERPL-MCNC: 6.1 G/DL (ref 6–8.4)
RBC # BLD AUTO: 3.76 M/UL (ref 4–5.4)
SODIUM SERPL-SCNC: 135 MMOL/L (ref 136–145)
TROPONIN I SERPL DL<=0.01 NG/ML-MCNC: 0.58 NG/ML (ref 0–0.03)
TROPONIN I SERPL DL<=0.01 NG/ML-MCNC: 1.11 NG/ML (ref 0–0.03)
WBC # BLD AUTO: 13.42 K/UL (ref 3.9–12.7)

## 2024-05-30 PROCEDURE — 21400001 HC TELEMETRY ROOM

## 2024-05-30 PROCEDURE — 80053 COMPREHEN METABOLIC PANEL: CPT | Performed by: INTERNAL MEDICINE

## 2024-05-30 PROCEDURE — 63600175 PHARM REV CODE 636 W HCPCS: Performed by: NURSE PRACTITIONER

## 2024-05-30 PROCEDURE — 25000003 PHARM REV CODE 250: Performed by: INTERNAL MEDICINE

## 2024-05-30 PROCEDURE — 93010 ELECTROCARDIOGRAM REPORT: CPT | Mod: ,,, | Performed by: STUDENT IN AN ORGANIZED HEALTH CARE EDUCATION/TRAINING PROGRAM

## 2024-05-30 PROCEDURE — 84100 ASSAY OF PHOSPHORUS: CPT | Performed by: NURSE PRACTITIONER

## 2024-05-30 PROCEDURE — 99233 SBSQ HOSP IP/OBS HIGH 50: CPT | Mod: ,,, | Performed by: INTERNAL MEDICINE

## 2024-05-30 PROCEDURE — 36415 COLL VENOUS BLD VENIPUNCTURE: CPT | Performed by: INTERNAL MEDICINE

## 2024-05-30 PROCEDURE — 93005 ELECTROCARDIOGRAM TRACING: CPT

## 2024-05-30 PROCEDURE — 25000003 PHARM REV CODE 250: Performed by: FAMILY MEDICINE

## 2024-05-30 PROCEDURE — 94761 N-INVAS EAR/PLS OXIMETRY MLT: CPT

## 2024-05-30 PROCEDURE — 84484 ASSAY OF TROPONIN QUANT: CPT | Performed by: INTERNAL MEDICINE

## 2024-05-30 PROCEDURE — 11000001 HC ACUTE MED/SURG PRIVATE ROOM

## 2024-05-30 PROCEDURE — 83735 ASSAY OF MAGNESIUM: CPT | Performed by: NURSE PRACTITIONER

## 2024-05-30 PROCEDURE — 85025 COMPLETE CBC W/AUTO DIFF WBC: CPT | Performed by: INTERNAL MEDICINE

## 2024-05-30 RX ORDER — MAGNESIUM SULFATE HEPTAHYDRATE 40 MG/ML
2 INJECTION, SOLUTION INTRAVENOUS
Status: COMPLETED | OUTPATIENT
Start: 2024-05-30 | End: 2024-05-30

## 2024-05-30 RX ORDER — METOPROLOL SUCCINATE 25 MG/1
25 TABLET, EXTENDED RELEASE ORAL 2 TIMES DAILY
Status: DISCONTINUED | OUTPATIENT
Start: 2024-05-30 | End: 2024-05-31 | Stop reason: HOSPADM

## 2024-05-30 RX ORDER — ISOSORBIDE MONONITRATE 30 MG/1
30 TABLET, EXTENDED RELEASE ORAL DAILY
Status: DISCONTINUED | OUTPATIENT
Start: 2024-05-30 | End: 2024-05-31 | Stop reason: HOSPADM

## 2024-05-30 RX ADMIN — SODIUM CHLORIDE: 9 INJECTION, SOLUTION INTRAVENOUS at 06:05

## 2024-05-30 RX ADMIN — LOSARTAN POTASSIUM 25 MG: 25 TABLET, FILM COATED ORAL at 08:05

## 2024-05-30 RX ADMIN — MUPIROCIN: 20 OINTMENT TOPICAL at 09:05

## 2024-05-30 RX ADMIN — ATORVASTATIN CALCIUM 80 MG: 40 TABLET, FILM COATED ORAL at 09:05

## 2024-05-30 RX ADMIN — MAGNESIUM SULFATE HEPTAHYDRATE 2 G: 40 INJECTION, SOLUTION INTRAVENOUS at 10:05

## 2024-05-30 RX ADMIN — PRASUGREL 10 MG: 10 TABLET, FILM COATED ORAL at 08:05

## 2024-05-30 RX ADMIN — METOPROLOL SUCCINATE 25 MG: 25 TABLET, EXTENDED RELEASE ORAL at 09:05

## 2024-05-30 RX ADMIN — ASPIRIN 81 MG: 81 TABLET, COATED ORAL at 08:05

## 2024-05-30 RX ADMIN — FUROSEMIDE 40 MG: 40 TABLET ORAL at 08:05

## 2024-05-30 RX ADMIN — MUPIROCIN: 20 OINTMENT TOPICAL at 08:05

## 2024-05-30 RX ADMIN — MAGNESIUM SULFATE HEPTAHYDRATE 2 G: 40 INJECTION, SOLUTION INTRAVENOUS at 08:05

## 2024-05-30 RX ADMIN — ISOSORBIDE MONONITRATE 30 MG: 30 TABLET, EXTENDED RELEASE ORAL at 10:05

## 2024-05-30 RX ADMIN — METOPROLOL SUCCINATE 25 MG: 25 TABLET, EXTENDED RELEASE ORAL at 10:05

## 2024-05-30 NOTE — PLAN OF CARE
Pt NSR, VSS, sats wnl on room air and has been afebrile for shift. She ambulated in the room without assistance or complication. The radial pressure band has been removed without complication and the site is dressed. Pt is in bed, wheels are locked, in low position with the call light in  reach.

## 2024-05-30 NOTE — PROGRESS NOTES
[facility] - [facility] ([facility] Medicine  Progress Note    Patient Name: Eloisa Camara  MRN: [id number]  Patient Class: IP- Inpatient   Admission Date: 5/27/2024  Length of Stay: 2 days  Attending Physician: Jhonny Su MD  Primary Care Provider: Quintin Ying MD        Subjective:     Principal Problem:NSTEMI (non-ST elevated myocardial infarction)        HPI:  Eloisa Camara is a 53 y.o. female with a PMH  has a past medical history of Abnormal Pap smear and Hypertension. Presents as a transfer from our [facility] for Hypertensive emergency and further cardiac workup for tachycardia/palpitations.  Patient reports she she is enjoying the [employer] festival at which time she ate 3 dozen oysters and had a few alcoholic beverages. Patient states that when she woke up this morning she felt her heart beating fast.  Patient's apple watch reported heart rate in the 140s.  Patient reports that her heart rate improved.  Patient states that she did have a she sees coffee with an extra shot earlier today states that she felt her symptoms return.  Patient proceeded to her mother's house to check her blood pressure which was elevated 200s/100s.  Patient proceeded to her PCP's office at which time the following findings were noted.  Patient was ultimately sent to the ER for further evaluation of hypertensive emergency and tachycardia.  See PCP note below.    Note Per Primary Care Provider:  The patient, a 53-year-old female, presents with her , reporting a sudden onset of increased blood pressure starting today. She describes the sensation of her heart racing, which prompted her concern for elevated blood pressure. Objective measurements taken in the clinic confirmed her blood pressure to be critically high at 219/144 mmHg initially, with a subsequent reading of 218/135 mmHg. Her heart rate was noted to be 116 bpm on both occasions. The patient denies any identifiable cause or precipitating  factors for this episode. She reports no recent excessive caffeine intake, anxiety, or other stressors that could potentially explain this acute rise in blood pressure. Interestingly, the patient mentioned consuming three dozen oysters the day before presentation, though she denies any gastrointestinal symptoms such as abdominal pain, nausea, vomiting, or diarrhea, as well as denying any symptoms of chest pain, weakness, or fever. Her past medical history is significant for hypertension, for which she was previously prescribed benazepril 20 mg (last filled in ) and hydrochlorothiazide 25 mg (last filled in ), but she reports not taking any antihypertensive medications recently. The patient's history of non-compliance with antihypertensive therapy and the sudden, severe elevation in blood pressure are of particular concern.     ER workup at outside facility mostly unremarkable with the exception to Elevated BNP of 333, initial troponin 0.018 with delta troponin of 0.031, TSH of 3.305, drug screen and alcohol serum negative, UA negative, chest x-ray revealing cardiomegaly, an EKG revealing sinus tachycardia with biatrial enlargement left axis deviation and LVH with a ventricular rate of 114 beats per minute and a QT/QTC of 368/507.  Potassium was 3.4 and was treated with 40 mEq of potassium p.o..  Patient's blood pressure reading upon arrival to the ER was 231/135 with heart rate of 119.  Patient received numerous doses of IV hydralazine and labetalol in addition to p.o. clonidine and Norvasc which improved blood pressure reading to 158/88.  Hospital Medicine consulted to admit patient for hypertensive emergency and further cardiac workup.  Patient in agree with the treatment plan.  Patient admitted under observation status.    PCP: Quintin Ying       Overview/Hospital Course:   admitted for new onset congestive heart failure. [facility] maternal grandmother  in 60s from congestive heart failure  complications. Denies chest pain, dyspnea, nausea/vomiting. Only complaint is leg cramping. Awaiting heart cath per cardiology   5/30 status post pci with marleny placement in LAD. Cardiology recommending elective pci to LCX at later time. Transferring out of icu. Continue optimizing medication(s) and monitoring.    Interval History: See hospital course for today      Review of Systems   Constitutional:  Positive for activity change. Negative for appetite change and fatigue.   Respiratory:  Negative for shortness of breath.    Cardiovascular:  Negative for chest pain and leg swelling.   Gastrointestinal:  Negative for abdominal pain, nausea and vomiting.   Genitourinary:  Negative for difficulty urinating.   Skin:  Positive for wound.        Cramping improving   Neurological:  Negative for weakness.   Hematological:  Bruises/bleeds easily.   Psychiatric/Behavioral:  Positive for dysphoric mood. Negative for agitation, behavioral problems, confusion and decreased concentration.      Objective:     Vital Signs (Most Recent):  Temp: 97.8 °F (36.6 °C) (05/30/24 0701)  Pulse: 89 (05/30/24 0800)  Resp: (!) 49 (05/30/24 0800)  BP: (!) 160/80 (05/30/24 0800)  SpO2: 96 % (05/30/24 0800) Vital Signs (24h Range):  Temp:  [97.5 °F (36.4 °C)-98.3 °F (36.8 °C)] 97.8 °F (36.6 °C)  Pulse:  [] 89  Resp:  [13-68] 49  SpO2:  [91 %-100 %] 96 %  BP: ()/(51-88) 160/80     Weight: 59.9 kg (132 lb)  Body mass index is 22.66 kg/m².    Intake/Output Summary (Last 24 hours) at 5/30/2024 0853  Last data filed at 5/30/2024 0800  Gross per 24 hour   Intake 2147.4 ml   Output --   Net 2147.4 ml         Physical Exam  Vitals and nursing note reviewed. Exam conducted with a chaperone present (nursing).   Constitutional:       General: She is not in acute distress.     Appearance: She is ill-appearing. She is not toxic-appearing.   HENT:      Head: Normocephalic and atraumatic.   Cardiovascular:      Rate and Rhythm: Normal rate.      Heart  sounds: Murmur heard.   Pulmonary:      Effort: Pulmonary effort is normal. No respiratory distress.   Abdominal:      Palpations: Abdomen is soft.      Tenderness: There is no abdominal tenderness.   Musculoskeletal:      Right lower leg: No edema.      Left lower leg: No edema.   Skin:     General: Skin is warm.   Neurological:      Mental Status: She is alert and oriented to person, place, and time.      Motor: Weakness present.   Psychiatric:         Mood and Affect: Mood is anxious and depressed. Affect is tearful.             Significant Labs: All pertinent labs within the past 24 hours have been reviewed.  CBC:   Recent Labs   Lab 05/30/24  0600   WBC 13.42*   HGB 13.3   HCT 37.6        CMP:   Recent Labs   Lab 05/30/24  0600   *   K 3.7      CO2 20*      BUN 24*   CREATININE 0.9   CALCIUM 8.9   PROT 6.1   ALBUMIN 3.4*   BILITOT 1.4*   ALKPHOS 48*   AST 22   ALT 12   ANIONGAP 11       Significant Imaging: I have reviewed all pertinent imaging results/findings within the past 24 hours.  Cardiac cath    Assessment/Plan:      * NSTEMI (non-ST elevated myocardial infarction)  Status post pci with marleny to lad  Continue current regimen      New onset of congestive heart failure  Patient is identified as having Combined Systolic and Diastolic heart failure that is Acute. CHF is currently controlled. Latest ECHO performed and demonstrates- Results for orders placed during the hospital encounter of 05/27/24    Echo    Interpretation Summary    Left Ventricle: The left ventricle is normal in size. Normal wall thickness. There is concentric hypertrophy. Normal wall motion. There is moderately reduced systolic function with a visually estimated ejection fraction of 35 - 40%. Grade I diastolic dysfunction.    Right Ventricle: Normal right ventricular cavity size. Wall thickness is normal. Right ventricle wall motion  is normal. Systolic function is normal.    IVC/SVC: Normal venous pressure at 3  mmHg.  . Continue Beta Blocker and Furosemide and monitor clinical status closely. Monitor on telemetry. Patient is off CHF pathway.  Monitor strict Is&Os and daily weights.  Place on fluid restriction of 1.5 L. Cardiology has been consulted. Continue to stress to patient importance of self efficacy and  on diet for CHF. Last BNP reviewed- and noted below   Recent Labs   Lab 24  1623   *       [facility] maternal grandmother  in 60s due to complications from congestive heart failure  Lasix and beta blocker started  Cardiology following  Status post heart cath with JAYLEEN placement in LAD    Elevated troponin  Plan:  -tele monitoring  -trend troponin  -cards consult  -repeat EKG if warranted  -analgesics prn    Troponins trended down to within normal limits     Elevated brain natriuretic peptide (BNP) level  Plan:  -tele monitoring  -echo  -cards consult    Lasix and beta blocker started     Cardiomegaly  Plan:  -tele monitoring  -echo  -cards consult    new onset congestive heart failure with reduced ejection fraction     Hypertensive emergency  Patient has a current diagnosis of hypertensive urgency (without evidence of end organ damage) which is controlled.  Latest blood pressure and vitals reviewed-   Temp:  [97.5 °F (36.4 °C)-98.3 °F (36.8 °C)]   Pulse:  []   Resp:  [13-68]   BP: ()/(51-88)   SpO2:  [91 %-100 %] .   Patient currently off IV antihypertensives.   Home meds for hypertension were reviewed and noted below.   Hypertension Medications               hydrochlorothiazide (HYDRODIURIL) 25 MG tablet Take 25 mg by mouth once daily.            Medication adjustment for hospital antihypertensives is as follows- prn hydralazine and labetalol. Continue oral antihypertensives    Will aim for controlled BP reduction by medications noted above. Monitor and mitigate end organ damage as indicated.    Improved   Continue current regimen      Smoker  Smoking cessation advised  Patient will  think about it        VTE Risk Mitigation (From admission, onward)           Ordered     IP VTE LOW RISK PATIENT  Once         05/28/24 0137     Place sequential compression device  Until discontinued         05/28/24 0137                    Discharge Planning   MUMTAZ:      Code Status: Full Code   Is the patient medically ready for discharge?:     Reason for patient still in hospital (select all that apply): Patient new problem, Patient trending condition, Laboratory test, Treatment, Consult recommendations, and Pending disposition  Discharge Plan A: Home            Critical care time spent on the evaluation and treatment of severe organ dysfunction, review of pertinent labs and imaging studies, discussions with consulting providers and discussions with patient/family: 41 minutes.      Beny Olvera MD  Department of Hospital Medicine   [facility] - [facility] ([facility])

## 2024-05-30 NOTE — NURSING TRANSFER
Nursing Transfer Note      5/30/2024   2:10 PM    Nurse giving handoff:NAVA Tinajero    Reason patient is being transferred: Downgrade    Transfer To: MT Room 572 From ICU 8    Transfer via wheelchair    Transfer with cardiac monitoring    Transported by NAVA BAKER      Order for Tele Monitor? Yes      4eyes on Skin: yes      Any special needs or follow-up needed: Cards; Hosp Med    Patient belongings transferred with patient: Yes    Chart send with patient: Yes    Upon arrival to floor: cardiac monitor applied, patient oriented to room, call bell in reach, and bed in lowest position

## 2024-05-30 NOTE — PROGRESS NOTES
[facility] ([facility])  Critical Care Medicine  Progress Note    Patient Name: Eloisa Camara  MRN: [id number]  Admission Date: 5/27/2024  Hospital Length of Stay: 2 days  Code Status: Full Code  Attending Provider: Jhonny Su MD  Primary Care Provider: Quintin Ying MD   Principal Problem: NSTEMI (non-ST elevated myocardial infarction)    Subjective:     HPI:  Year old female with a known past medical history of hypertension that presented to the ER from her PCP's office secondary to uncontrolled hypertension on 05/27.  Patient also reported palpitations and rapid heartbeat that began day prior to presentation.  Patient checked her blood pressure at home and noted it to be elevated at 2 100/100 with a heart rate of 114.  Initial workup in the ER revealed hypokalemia with a potassium of 3.4, BNP 3 3 3, and a troponin that was mildly elevated.  It was documented that patient used to be on blood pressure medicines but has not taken revealed though since 2017.  Patient was admitted to the hospital for Hospital Medicine for further evaluation and management.  Cardiology was consulted during this admission to assist.  During this admission has found that the patient has heart failure with the EF of 35-40%.  Patient was taken to the cath lab 5/29 with Dr. Mccoy and she was found to have a 99% block mid LAD which was successfully stented.  During the case it was reported that patient's blood pressure his systolic of the 60s where she required multiple pressors for some time and also had a reaction to contrast which she did not know she was allergic to.  He was also noted the patient has a lesion to her circ, cardiology reports they will address this at a later time, possibly even outpatient.  After cath lab patient was transferred to the ICU on Aggrastat infusion which is to run for 12 hours as well as Levophed for blood pressure support.  Critical Care Medicine consulted for assistance while patient  to ICU.    At the time of my exam patient was quite drowsy from anesthesia as well as Benadryl that she was given in cath lab.  She does awakens easily but drifts off to sleep.  She was on low-dose Levophed via peripheral IV.  Later in the evening patient was seen again where she was awake eating dinner and Levophed is off.  Family at bedside and updates provided.    Hospital/ICU Course:  5/30: no acute events noted overnight, VSS, off all gtts, eating breakfast, asking when she can go home - will defer to cards, stable for transfer back to the floor -  attn aware of tx orders     ROS complete and negative unless stated in the interval HPI     Objective:     Vital Signs (Most Recent):  Temp: 97.8 °F (36.6 °C) (05/30/24 0701)  Pulse: 74 (05/30/24 0701)  Resp: 14 (05/30/24 0701)  BP: (!) 148/84 (05/30/24 0701)  SpO2: 95 % (05/30/24 0701) Vital Signs (24h Range):  Temp:  [97.5 °F (36.4 °C)-98.3 °F (36.8 °C)] 97.8 °F (36.6 °C)  Pulse:  [] 74  Resp:  [13-68] 14  SpO2:  [91 %-100 %] 95 %  BP: ()/(51-88) 148/84     Weight: 59.9 kg (132 lb)  Body mass index is 22.66 kg/m².      Intake/Output Summary (Last 24 hours) at 5/30/2024 0751  Last data filed at 5/30/2024 0700  Gross per 24 hour   Intake 1947.5 ml   Output --   Net 1947.5 ml        Physical Exam  Vitals reviewed.   Constitutional:       General: She is awake. She is not in acute distress.     Appearance: She is well-developed. She is not ill-appearing.   Cardiovascular:      Rate and Rhythm: Normal rate.      Pulses:           Radial pulses are 2+ on the right side and 2+ on the left side.   Pulmonary:      Effort: Pulmonary effort is normal.      Breath sounds: Normal breath sounds.   Abdominal:      General: There is no distension.      Palpations: Abdomen is soft.   Musculoskeletal:      Right lower leg: No edema.      Left lower leg: No edema.   Skin:     General: Skin is warm and dry.   Neurological:      General: No focal deficit present.       "Mental Status: She is alert.   Psychiatric:         Behavior: Behavior is cooperative.              Lines/Drains/Airways       Peripheral Intravenous Line  Duration                  Peripheral IV - Single Lumen 05/29/24 1144 20 G Anterior;Distal;Right Forearm <1 day                    Significant Labs:    CBC/Anemia Profile:  Recent Labs   Lab 05/30/24  0600   WBC 13.42*   HGB 13.3   HCT 37.6      *   RDW 12.2        Chemistries:  Recent Labs   Lab 05/28/24  1337 05/30/24  0600   NA  --  135*   K  --  3.7   CL  --  104   CO2  --  20*   BUN  --  24*   CREATININE  --  0.9   CALCIUM  --  8.9   ALBUMIN  --  3.4*   PROT  --  6.1   BILITOT  --  1.4*   ALKPHOS  --  48*   ALT  --  12   AST  --  22   MG 1.7 1.5*   PHOS  --  4.0       All pertinent labs within the past 24 hours have been reviewed.    Significant Imaging:  I have reviewed all pertinent imaging results/findings within the past 24 hours.    ABG  No results for input(s): "PH", "PO2", "PCO2", "HCO3", "BE" in the last 168 hours.  Assessment/Plan:     Cardiac/Vascular  * NSTEMI (non-ST elevated myocardial infarction)  - mgmt per cards  - cath lab 5/29 with JAYLEEN x2 to LAD  - s/p aggrastat x12 hours  - DAPT for 1 year  - OMT for CAD/CHF  - possible future PCI to LCX, possibly outpt  - trend serial troponin  - telemetry      New onset of congestive heart failure  Patient is identified as having Combined Systolic and Diastolic heart failure that is Acute. CHF is currently uncontrolled due to Continued edema of extremities. Latest ECHO performed and demonstrates- Results for orders placed during the hospital encounter of 05/27/24     Echo     Interpretation Summary    Left Ventricle: The left ventricle is normal in size. Normal wall thickness. There is concentric hypertrophy. Normal wall motion. There is moderately reduced systolic function with a visually estimated ejection fraction of 35 - 40%. Grade I diastolic dysfunction.    Right Ventricle: Normal " right ventricular cavity size. Wall thickness is normal. Right ventricle wall motion  is normal. Systolic function is normal.    IVC/SVC: Normal venous pressure at 3 mmHg.      Recent Labs   Lab 05/27/24  1623   *   - mgmt per cards  - see plan for NSTEMI     Elevated troponin  - see plan for NSTEMI     Elevated brain natriuretic peptide (BNP) level  - see plan for NSTEMI     Hypertensive emergency  - trends improved  - BB, lasix, and losartan   - mgmt per cards      Other  Smoker  - cessation education  - nicotine patch if needed    Prophylaxis Measures:  GI ppx: Famotidine  VTE ppx: SCDs  Glucose control: Monitor blood glucose    Code Status: Full Code    Patient stable for care outside of the ICU setting. [facility] [place]. Critical Care team will sign off at this time as patient's critical care issues have resolved and patient is appropriate for ongoing management outside of the ICU setting. Please call if the patient's condition should change and warrant reevaluation.      Janes Astudillo NP  Critical Care Medicine  [facility] - Int[facility] ([facility])

## 2024-05-30 NOTE — ASSESSMENT & PLAN NOTE
Patient has a current diagnosis of hypertensive urgency (without evidence of end organ damage) which is controlled.  Latest blood pressure and vitals reviewed-   Temp:  [97.5 °F (36.4 °C)-98.3 °F (36.8 °C)]   Pulse:  []   Resp:  [13-68]   BP: ()/(51-88)   SpO2:  [91 %-100 %] .   Patient currently off IV antihypertensives.   Home meds for hypertension were reviewed and noted below.   Hypertension Medications               hydrochlorothiazide (HYDRODIURIL) 25 MG tablet Take 25 mg by mouth once daily.            Medication adjustment for hospital antihypertensives is as follows- prn hydralazine and labetalol. Continue oral antihypertensives    Will aim for controlled BP reduction by medications noted above. Monitor and mitigate end organ damage as indicated.    Improved   Continue current regimen

## 2024-05-30 NOTE — PROGRESS NOTES
[facility] ([facility])  Cardiology  Progress Note    Patient Name: Eloisa Camara  MRN: [id number]  Admission Date: 5/27/2024  Hospital Length of Stay: 2 days  Code Status: Full Code   Attending Physician: Beny Olvera MD   Primary Care Physician: Quintin Ying MD  Expected Discharge Date:   Principal Problem:NSTEMI (non-ST elevated myocardial infarction)    Subjective:     Hospital Course:   5/29/24-Patient seen and examined today, resting in bed. Feels well. No CP or SOB overnight. NO CV complaints. Labs reviewed/stable. TTE with EF of 35-40%, +WMA. R/LHC planned today.    5/30/24: PT SEEN AND EXAMINED IN ICU THIS AM. FEELS OK. OFF PRESSORS.  LABS REVIEWED.  DISCUSSED CAD W PT IN DETAIL.          Review of Systems   Constitutional: Positive for malaise/fatigue.   HENT: Negative.     Eyes: Negative.    Cardiovascular: Negative.    Respiratory: Negative.     Endocrine: Negative.    Hematologic/Lymphatic: Negative.    Skin: Negative.    Musculoskeletal: Negative.    Gastrointestinal: Negative.    Genitourinary: Negative.    Neurological: Negative.    Psychiatric/Behavioral: Negative.     Allergic/Immunologic: Negative.        Objective:     Vital Signs (Most Recent):  Temp: 97.8 °F (36.6 °C) (05/30/24 0701)  Pulse: 81 (05/30/24 0900)  Resp: (!) 21 (05/30/24 0900)  BP: (!) 161/76 (05/30/24 0900)  SpO2: 97 % (05/30/24 0900) Vital Signs (24h Range):  Temp:  [97.5 °F (36.4 °C)-98.3 °F (36.8 °C)] 97.8 °F (36.6 °C)  Pulse:  [] 81  Resp:  [13-68] 21  SpO2:  [91 %-100 %] 97 %  BP: ()/(51-88) 161/76     Weight: 59.9 kg (132 lb)  Body mass index is 22.66 kg/m².     SpO2: 97 %         Intake/Output Summary (Last 24 hours) at 5/30/2024 0937  Last data filed at 5/30/2024 0900  Gross per 24 hour   Intake 2213.76 ml   Output --   Net 2213.76 ml       Lines/Drains/Airways       Peripheral Intravenous Line  Duration                  Peripheral IV - Single Lumen 05/29/24 1144 20 G Anterior;Distal;Right Forearm  <1 day                       Physical Exam  Vitals and nursing note reviewed.   Constitutional:       General: She is not in acute distress.     Appearance: Normal appearance. She is well-developed. She is not ill-appearing or diaphoretic.   HENT:      Head: Normocephalic.   Neck:      Thyroid: No thyromegaly.      Vascular: No carotid bruit or JVD.   Cardiovascular:      Rate and Rhythm: Normal rate and regular rhythm.      Pulses: Normal pulses.           Radial pulses are 2+ on the right side and 2+ on the left side.      Heart sounds: Normal heart sounds, S1 normal and S2 normal. No murmur heard.     No friction rub. No gallop.      Comments: Left radial access site mild ecchymosis, intact pulse  Pulmonary:      Effort: Pulmonary effort is normal.      Breath sounds: Normal breath sounds. No wheezing or rales.   Abdominal:      General: Bowel sounds are normal. There is no abdominal bruit.      Palpations: Abdomen is soft.      Tenderness: There is no abdominal tenderness.   Musculoskeletal:      Cervical back: Neck supple.   Lymphadenopathy:      Cervical: No cervical adenopathy.   Skin:     General: Skin is warm.   Neurological:      Mental Status: She is alert and oriented to person, place, and time.   Psychiatric:         Behavior: Behavior normal. Behavior is cooperative.        I have reviewed all pertinent labs and cardiac studies.        Results for orders placed during the hospital encounter of 05/27/24    Cardiac catheterization    Conclusion    Successful PCI 99% heavily calcified eccentric mid LAD bifurcation lesion, with abrupt closure.  Treated with JAYLEEN x 2 overlapped with 0% residual stenosis.    70% calcified proximal LCX stenosis.    50% calcified mid RCA stenosis.    LVEF 35%.    Recommendations:  Maximal medical therapy for CAD/CHF as tolerated and risk factor modification.  Consider elective PCI LCX in future.    The procedure log was documented by Documenter: Jeremy Waldron RN and verified  by Quintin Mccoy MD.    Date: 5/29/2024  Time: 11:59 AM    Assessment and Plan:     S/P PCI SEVERE CALCIFIED LAD WITH ABRUPT CLOSURE OF VESSEL.  PCI WAS ABLE TO BE STENTED JAYLEEN X 2.  70% CALCIFIED LCX STENOSIS.  STABLE STATUS NOW.  DISCUSSED HER CAD MGT.  MAXIMAL MED TX FOR CAD/CHF.  SMOKING CESSATION.  ADD IMDUR.  INCREASE TOPROL.  ASA/EFFIENT X 1 YEAR.  ECHO 6 WEEKS.  PLAN FOR POSSIBLE STAGED PCI LCX AT LATER DATE.  TX TO FLOOR.  KEEP IN HOSPITAL ANOTHER 24 HOURS THEN CAN GO HOME.    * NSTEMI (non-ST elevated myocardial infarction)  As above.    CAD, multiple vessel  As above    New onset of congestive heart failure  -TTE with EF of 35-40%  -Continue po Lasis, ARB, BB  -R/LHC today    Elevated troponin  -Troponin mildly elevated but flat, 0.018>0.031>0.033>0.024  -Elevation secondary to demand ischemia from uncontrolled BP  -ASA  -TTE pending  -If EF normal can f/u as OP    5/29/24  -Remains CP free  -TTE with EF of 35-40%  -Continue ASA, BB, ARB  -R/LHC today by Dr. Mccoy, further rec's to follow    Elevated brain natriuretic peptide (BNP) level  -Diurese  -TTE pending    5/29/24  -TTE with EF of 35-40%  -R/LHC planned today  -Will optimize regimen post procedure    Cardiomegaly  -TTE pending    Hypertensive emergency  -BP uncontrolled upon admission, in setting of medication non-compliance and recent intake of high salt foods  -Can utilize ARB or CCB, defer to hospital medicine  -TTE pending    5/29/24  -BP improved, continue BB, ARB, po Lasix  -TTE with EF of 35-40%  -R/LHC planned today        VTE Risk Mitigation (From admission, onward)           Ordered     IP VTE LOW RISK PATIENT  Once         05/28/24 0137     Place sequential compression device  Until discontinued         05/28/24 0137                    Quintin Mccoy MD  Cardiology  [facility] ([facility])

## 2024-05-30 NOTE — ASSESSMENT & PLAN NOTE
Patient is identified as having Combined Systolic and Diastolic heart failure that is Acute. CHF is currently controlled. Latest ECHO performed and demonstrates- Results for orders placed during the hospital encounter of 24    Echo    Interpretation Summary    Left Ventricle: The left ventricle is normal in size. Normal wall thickness. There is concentric hypertrophy. Normal wall motion. There is moderately reduced systolic function with a visually estimated ejection fraction of 35 - 40%. Grade I diastolic dysfunction.    Right Ventricle: Normal right ventricular cavity size. Wall thickness is normal. Right ventricle wall motion  is normal. Systolic function is normal.    IVC/SVC: Normal venous pressure at 3 mmHg.  . Continue Beta Blocker and Furosemide and monitor clinical status closely. Monitor on telemetry. Patient is off CHF pathway.  Monitor strict Is&Os and daily weights.  Place on fluid restriction of 1.5 L. Cardiology has been consulted. Continue to stress to patient importance of self efficacy and  on diet for CHF. Last BNP reviewed- and noted below   Recent Labs   Lab 24  1623   *       [facility] maternal grandmother  in 60s due to complications from congestive heart failure  Lasix and beta blocker started  Cardiology following  Status post heart cath with JAYLEEN placement in LAD

## 2024-05-30 NOTE — SUBJECTIVE & OBJECTIVE
Review of Systems   Constitutional: Positive for malaise/fatigue.   HENT: Negative.     Eyes: Negative.    Cardiovascular: Negative.    Respiratory: Negative.     Endocrine: Negative.    Hematologic/Lymphatic: Negative.    Skin: Negative.    Musculoskeletal: Negative.    Gastrointestinal: Negative.    Genitourinary: Negative.    Neurological: Negative.    Psychiatric/Behavioral: Negative.     Allergic/Immunologic: Negative.        Objective:     Vital Signs (Most Recent):  Temp: 97.8 °F (36.6 °C) (05/30/24 0701)  Pulse: 81 (05/30/24 0900)  Resp: (!) 21 (05/30/24 0900)  BP: (!) 161/76 (05/30/24 0900)  SpO2: 97 % (05/30/24 0900) Vital Signs (24h Range):  Temp:  [97.5 °F (36.4 °C)-98.3 °F (36.8 °C)] 97.8 °F (36.6 °C)  Pulse:  [] 81  Resp:  [13-68] 21  SpO2:  [91 %-100 %] 97 %  BP: ()/(51-88) 161/76     Weight: 59.9 kg (132 lb)  Body mass index is 22.66 kg/m².     SpO2: 97 %         Intake/Output Summary (Last 24 hours) at 5/30/2024 0937  Last data filed at 5/30/2024 0900  Gross per 24 hour   Intake 2213.76 ml   Output --   Net 2213.76 ml       Lines/Drains/Airways       Peripheral Intravenous Line  Duration                  Peripheral IV - Single Lumen 05/29/24 1144 20 G Anterior;Distal;Right Forearm <1 day                       Physical Exam  Vitals and nursing note reviewed.   Constitutional:       General: She is not in acute distress.     Appearance: Normal appearance. She is well-developed. She is not ill-appearing or diaphoretic.   HENT:      Head: Normocephalic.   Neck:      Thyroid: No thyromegaly.      Vascular: No carotid bruit or JVD.   Cardiovascular:      Rate and Rhythm: Normal rate and regular rhythm.      Pulses: Normal pulses.           Radial pulses are 2+ on the right side and 2+ on the left side.      Heart sounds: Normal heart sounds, S1 normal and S2 normal. No murmur heard.     No friction rub. No gallop.      Comments: Left radial access site mild ecchymosis, intact  pulse  Pulmonary:      Effort: Pulmonary effort is normal.      Breath sounds: Normal breath sounds. No wheezing or rales.   Abdominal:      General: Bowel sounds are normal. There is no abdominal bruit.      Palpations: Abdomen is soft.      Tenderness: There is no abdominal tenderness.   Musculoskeletal:      Cervical back: Neck supple.   Lymphadenopathy:      Cervical: No cervical adenopathy.   Skin:     General: Skin is warm.   Neurological:      Mental Status: She is alert and oriented to person, place, and time.   Psychiatric:         Behavior: Behavior normal. Behavior is cooperative.        I have reviewed all pertinent labs and cardiac studies.

## 2024-05-30 NOTE — SUBJECTIVE & OBJECTIVE
ROS complete and negative unless stated in the interval HPI     Objective:     Vital Signs (Most Recent):  Temp: 97.8 °F (36.6 °C) (05/30/24 0701)  Pulse: 74 (05/30/24 0701)  Resp: 14 (05/30/24 0701)  BP: (!) 148/84 (05/30/24 0701)  SpO2: 95 % (05/30/24 0701) Vital Signs (24h Range):  Temp:  [97.5 °F (36.4 °C)-98.3 °F (36.8 °C)] 97.8 °F (36.6 °C)  Pulse:  [] 74  Resp:  [13-68] 14  SpO2:  [91 %-100 %] 95 %  BP: ()/(51-88) 148/84     Weight: 59.9 kg (132 lb)  Body mass index is 22.66 kg/m².      Intake/Output Summary (Last 24 hours) at 5/30/2024 0751  Last data filed at 5/30/2024 0700  Gross per 24 hour   Intake 1947.5 ml   Output --   Net 1947.5 ml        Physical Exam  Vitals reviewed.   Constitutional:       General: She is awake. She is not in acute distress.     Appearance: She is well-developed. She is not ill-appearing.   Cardiovascular:      Rate and Rhythm: Normal rate.      Pulses:           Radial pulses are 2+ on the right side and 2+ on the left side.   Pulmonary:      Effort: Pulmonary effort is normal.      Breath sounds: Normal breath sounds.   Abdominal:      General: There is no distension.      Palpations: Abdomen is soft.   Musculoskeletal:      Right lower leg: No edema.      Left lower leg: No edema.   Skin:     General: Skin is warm and dry.   Neurological:      General: No focal deficit present.      Mental Status: She is alert.   Psychiatric:         Behavior: Behavior is cooperative.              Lines/Drains/Airways       Peripheral Intravenous Line  Duration                  Peripheral IV - Single Lumen 05/29/24 1144 20 G Anterior;Distal;Right Forearm <1 day                    Significant Labs:    CBC/Anemia Profile:  Recent Labs   Lab 05/30/24  0600   WBC 13.42*   HGB 13.3   HCT 37.6      *   RDW 12.2        Chemistries:  Recent Labs   Lab 05/28/24  1337 05/30/24  0600   NA  --  135*   K  --  3.7   CL  --  104   CO2  --  20*   BUN  --  24*   CREATININE  --  0.9    CALCIUM  --  8.9   ALBUMIN  --  3.4*   PROT  --  6.1   BILITOT  --  1.4*   ALKPHOS  --  48*   ALT  --  12   AST  --  22   MG 1.7 1.5*   PHOS  --  4.0       All pertinent labs within the past 24 hours have been reviewed.    Significant Imaging:  I have reviewed all pertinent imaging results/findings within the past 24 hours.

## 2024-05-30 NOTE — SUBJECTIVE & OBJECTIVE
Interval History: See hospital course for today      Review of Systems   Constitutional:  Positive for activity change. Negative for appetite change and fatigue.   Respiratory:  Negative for shortness of breath.    Cardiovascular:  Negative for chest pain and leg swelling.   Gastrointestinal:  Negative for abdominal pain, nausea and vomiting.   Genitourinary:  Negative for difficulty urinating.   Skin:  Positive for wound.        Cramping improving   Neurological:  Negative for weakness.   Hematological:  Bruises/bleeds easily.   Psychiatric/Behavioral:  Positive for dysphoric mood. Negative for agitation, behavioral problems, confusion and decreased concentration.      Objective:     Vital Signs (Most Recent):  Temp: 97.8 °F (36.6 °C) (05/30/24 0701)  Pulse: 89 (05/30/24 0800)  Resp: (!) 49 (05/30/24 0800)  BP: (!) 160/80 (05/30/24 0800)  SpO2: 96 % (05/30/24 0800) Vital Signs (24h Range):  Temp:  [97.5 °F (36.4 °C)-98.3 °F (36.8 °C)] 97.8 °F (36.6 °C)  Pulse:  [] 89  Resp:  [13-68] 49  SpO2:  [91 %-100 %] 96 %  BP: ()/(51-88) 160/80     Weight: 59.9 kg (132 lb)  Body mass index is 22.66 kg/m².    Intake/Output Summary (Last 24 hours) at 5/30/2024 0853  Last data filed at 5/30/2024 0800  Gross per 24 hour   Intake 2147.4 ml   Output --   Net 2147.4 ml         Physical Exam  Vitals and nursing note reviewed. Exam conducted with a chaperone present (nursing).   Constitutional:       General: She is not in acute distress.     Appearance: She is ill-appearing. She is not toxic-appearing.   HENT:      Head: Normocephalic and atraumatic.   Cardiovascular:      Rate and Rhythm: Normal rate.      Heart sounds: Murmur heard.   Pulmonary:      Effort: Pulmonary effort is normal. No respiratory distress.   Abdominal:      Palpations: Abdomen is soft.      Tenderness: There is no abdominal tenderness.   Musculoskeletal:      Right lower leg: No edema.      Left lower leg: No edema.   Skin:     General: Skin is  warm.   Neurological:      Mental Status: She is alert and oriented to person, place, and time.      Motor: Weakness present.   Psychiatric:         Mood and Affect: Mood is anxious and depressed. Affect is tearful.             Significant Labs: All pertinent labs within the past 24 hours have been reviewed.  CBC:   Recent Labs   Lab 05/30/24  0600   WBC 13.42*   HGB 13.3   HCT 37.6        CMP:   Recent Labs   Lab 05/30/24  0600   *   K 3.7      CO2 20*      BUN 24*   CREATININE 0.9   CALCIUM 8.9   PROT 6.1   ALBUMIN 3.4*   BILITOT 1.4*   ALKPHOS 48*   AST 22   ALT 12   ANIONGAP 11       Significant Imaging: I have reviewed all pertinent imaging results/findings within the past 24 hours.  Cardiac cath

## 2024-05-30 NOTE — HOSPITAL COURSE
5/30: no acute events noted overnight, VSS, off all gtts, eating breakfast, asking when she can go home - will defer to cards, stable for transfer back to the floor -  attn aware of tx orders

## 2024-05-30 NOTE — ASSESSMENT & PLAN NOTE
- mgmt per cards  - to cath 5/29 with JAYLEEN x2 to LAD  - aggrastat x12 hours complete  - DAPT for 1 year  - OMT for CAD/CHF; on ASA, statin, BB, lasix, losartan, and prasegrel  - possible future PCI to LCX, possibly outpt  - trend troponin, climbing but not unexpected, pt CP free  - replete mag this AM  - telemetry

## 2024-05-30 NOTE — ASSESSMENT & PLAN NOTE
Patient is identified as having Combined Systolic and Diastolic heart failure that is Acute. CHF is currently uncontrolled due to Continued edema of extremities. Latest ECHO performed and demonstrates- Results for orders placed during the hospital encounter of 05/27/24    Echo    Interpretation Summary    Left Ventricle: The left ventricle is normal in size. Normal wall thickness. There is concentric hypertrophy. Normal wall motion. There is moderately reduced systolic function with a visually estimated ejection fraction of 35 - 40%. Grade I diastolic dysfunction.    Right Ventricle: Normal right ventricular cavity size. Wall thickness is normal. Right ventricle wall motion  is normal. Systolic function is normal.    IVC/SVC: Normal venous pressure at 3 mmHg.  Recent Labs   Lab 05/27/24  1623   *     - mgmt per cards  - see plan for NSTEMI

## 2024-05-30 NOTE — ASSESSMENT & PLAN NOTE
Plan:  -tele monitoring  -echo  -cards consult    new onset congestive heart failure with reduced ejection fraction

## 2024-05-31 LAB
ANION GAP SERPL CALC-SCNC: 10 MMOL/L (ref 8–16)
BUN SERPL-MCNC: 19 MG/DL (ref 6–20)
CALCIUM SERPL-MCNC: 9.4 MG/DL (ref 8.7–10.5)
CHLORIDE SERPL-SCNC: 105 MMOL/L (ref 95–110)
CO2 SERPL-SCNC: 24 MMOL/L (ref 23–29)
CREAT SERPL-MCNC: 0.8 MG/DL (ref 0.5–1.4)
EST. GFR  (NO RACE VARIABLE): >60 ML/MIN/1.73 M^2
GLUCOSE SERPL-MCNC: 93 MG/DL (ref 70–110)
MAGNESIUM SERPL-MCNC: 2.2 MG/DL (ref 1.6–2.6)
OHS QRS DURATION: 86 MS
OHS QTC CALCULATION: 500 MS
PHOSPHATE SERPL-MCNC: 3.6 MG/DL (ref 2.7–4.5)
POCT GLUCOSE: 99 MG/DL (ref 70–110)
POTASSIUM SERPL-SCNC: 4 MMOL/L (ref 3.5–5.1)
SODIUM SERPL-SCNC: 139 MMOL/L (ref 136–145)
TROPONIN I SERPL DL<=0.01 NG/ML-MCNC: 1.68 NG/ML (ref 0–0.03)

## 2024-05-31 PROCEDURE — 36415 COLL VENOUS BLD VENIPUNCTURE: CPT | Performed by: NURSE PRACTITIONER

## 2024-05-31 PROCEDURE — 25000003 PHARM REV CODE 250: Performed by: INTERNAL MEDICINE

## 2024-05-31 PROCEDURE — 84100 ASSAY OF PHOSPHORUS: CPT | Performed by: NURSE PRACTITIONER

## 2024-05-31 PROCEDURE — 94761 N-INVAS EAR/PLS OXIMETRY MLT: CPT

## 2024-05-31 PROCEDURE — 99232 SBSQ HOSP IP/OBS MODERATE 35: CPT | Mod: ,,, | Performed by: PHYSICIAN ASSISTANT

## 2024-05-31 PROCEDURE — 80048 BASIC METABOLIC PNL TOTAL CA: CPT | Performed by: PHYSICIAN ASSISTANT

## 2024-05-31 PROCEDURE — 84484 ASSAY OF TROPONIN QUANT: CPT | Performed by: STUDENT IN AN ORGANIZED HEALTH CARE EDUCATION/TRAINING PROGRAM

## 2024-05-31 PROCEDURE — 36415 COLL VENOUS BLD VENIPUNCTURE: CPT | Performed by: STUDENT IN AN ORGANIZED HEALTH CARE EDUCATION/TRAINING PROGRAM

## 2024-05-31 PROCEDURE — 83735 ASSAY OF MAGNESIUM: CPT | Performed by: NURSE PRACTITIONER

## 2024-05-31 RX ORDER — FUROSEMIDE 40 MG/1
40 TABLET ORAL
Qty: 15 TABLET | Refills: 0 | Status: SHIPPED | OUTPATIENT
Start: 2024-05-31

## 2024-05-31 RX ORDER — ASPIRIN 81 MG/1
81 TABLET ORAL DAILY
Start: 2024-06-01 | End: 2024-06-04 | Stop reason: SDUPTHER

## 2024-05-31 RX ORDER — ATORVASTATIN CALCIUM 80 MG/1
80 TABLET, FILM COATED ORAL NIGHTLY
Qty: 30 TABLET | Refills: 0 | Status: SHIPPED | OUTPATIENT
Start: 2024-05-31 | End: 2024-06-04 | Stop reason: SDUPTHER

## 2024-05-31 RX ORDER — ISOSORBIDE MONONITRATE 30 MG/1
30 TABLET, EXTENDED RELEASE ORAL DAILY
Qty: 30 TABLET | Refills: 0 | Status: SHIPPED | OUTPATIENT
Start: 2024-06-01 | End: 2024-06-04 | Stop reason: SDUPTHER

## 2024-05-31 RX ORDER — POTASSIUM CHLORIDE 750 MG/1
10 TABLET, EXTENDED RELEASE ORAL
Qty: 30 TABLET | Refills: 0 | Status: SHIPPED | OUTPATIENT
Start: 2024-05-31

## 2024-05-31 RX ORDER — PRASUGREL 10 MG/1
10 TABLET, FILM COATED ORAL DAILY
Qty: 30 TABLET | Refills: 0 | Status: SHIPPED | OUTPATIENT
Start: 2024-06-01 | End: 2024-06-04 | Stop reason: SDUPTHER

## 2024-05-31 RX ORDER — METOPROLOL SUCCINATE 25 MG/1
25 TABLET, EXTENDED RELEASE ORAL 2 TIMES DAILY
Qty: 60 TABLET | Refills: 0 | Status: SHIPPED | OUTPATIENT
Start: 2024-05-31 | End: 2024-06-04 | Stop reason: SDUPTHER

## 2024-05-31 RX ADMIN — ASPIRIN 81 MG: 81 TABLET, COATED ORAL at 09:05

## 2024-05-31 RX ADMIN — METOPROLOL SUCCINATE 25 MG: 25 TABLET, EXTENDED RELEASE ORAL at 09:05

## 2024-05-31 RX ADMIN — FUROSEMIDE 40 MG: 40 TABLET ORAL at 09:05

## 2024-05-31 RX ADMIN — PRASUGREL 10 MG: 10 TABLET, FILM COATED ORAL at 09:05

## 2024-05-31 RX ADMIN — LOSARTAN POTASSIUM 25 MG: 25 TABLET, FILM COATED ORAL at 09:05

## 2024-05-31 RX ADMIN — ISOSORBIDE MONONITRATE 30 MG: 30 TABLET, EXTENDED RELEASE ORAL at 09:05

## 2024-05-31 NOTE — ASSESSMENT & PLAN NOTE
-TTE with EF of 35-40%  -Continue po Lasis, ARB, BB  -R/LHC today    5/31/24  -s/p revascularization, meds optimized

## 2024-05-31 NOTE — DISCHARGE INSTRUCTIONS
You have been started on new medication(s) from this hospitalization. Please take as directed and schedule hospital follow up appointments with your primary providers.    A nurse practitioner may be contacting you to assess your status post-hospitalization.

## 2024-05-31 NOTE — PROGRESS NOTES
[facility]  Cardiology  Progress Note    Patient Name: Eloisa Camara  MRN: [id number]  Admission Date: 5/27/2024  Hospital Length of Stay: 3 days  Code Status: Full Code   Attending Physician: No att. providers found   Primary Care Physician: Quintin Ying MD  Expected Discharge Date: 5/31/2024  Principal Problem:NSTEMI (non-ST elevated myocardial infarction)    Subjective:   HPI:  Ms. Camara is a 53 year old female patient whose current medical conditions include HTN who was sent to [facility] ED from her PCP's office due to uncontrolled HTN. Patient became concerned her BP was high due to palpitations/rapid heart beat that onset yesterday AM. She checked her BP at her mother's house and noted it to be elevated at 200/100 with a pulse rate of 114, prompting her to make a same day appointment with her PCP where her BP continued to be extremely elevated (219/144 and 218/135). She denied any associated SOB, CP, LH, dizziness, headache, near syncope, or syncope. She did report indulging in excessive amounts of salty food at the [employer] [place] over the weekend. Initial workup labs revealed hypokalemia (K 3.4), BNP of 333, and troponin of 0.018>0.031 and patient was subsequently admitted for further evaluation and treatment. Cardiology consulted to assist with management. Patient seen and examined today, resting in bed. Feeling better. No CV complaints. Denies CP/SOB. Previously prescribed anti-hypertensive medication but has not filled these since 2017. Chart reviewed. Troponin flat, 0.018>0.031>0.033>0.034. TTE pending. EKG reviewed, sinus tachycardia, underlying LVH.     Hospital Course:   5/29/24-Patient seen and examined today, resting in bed. Feels well. No CP or SOB overnight. NO CV complaints. Labs reviewed/stable. TTE with EF of 35-40%, +WMA. R/LHC planned today.    5/30/24: PT SEEN AND EXAMINED IN ICU THIS AM. FEELS OK. OFF PRESSORS.  LABS REVIEWED.  DISCUSSED CAD W PT IN DETAIL.      5/31/24-Patient seen  and examined today, sitting up in bed. Feels well, wants to go home. No CP or SOB. BP improved. Labs reviewed.       Review of Systems   Constitutional: Negative.   HENT: Negative.     Eyes: Negative.    Cardiovascular: Negative.    Respiratory: Negative.     Endocrine: Negative.    Hematologic/Lymphatic: Negative.    Skin: Negative.    Musculoskeletal: Negative.    Gastrointestinal: Negative.    Genitourinary: Negative.    Neurological: Negative.    Psychiatric/Behavioral: Negative.     Allergic/Immunologic: Negative.      Objective:     Vital Signs (Most Recent):  Temp: 98.8 °F (37.1 °C) (05/31/24 0730)  Pulse: 71 (05/31/24 1455)  Resp: 18 (05/31/24 0730)  BP: (!) 153/81 (05/31/24 0730)  SpO2: 99 % (05/31/24 0730) Vital Signs (24h Range):  Temp:  [98.1 °F (36.7 °C)-98.8 °F (37.1 °C)] 98.8 °F (37.1 °C)  Pulse:  [66-84] 71  Resp:  [18] 18  SpO2:  [94 %-99 %] 99 %  BP: (116-153)/(57-81) 153/81     Weight: 59.9 kg (132 lb)  Body mass index is 22.66 kg/m².     SpO2: 99 %       No intake or output data in the 24 hours ending 05/31/24 1550    Lines/Drains/Airways       None                      Physical Exam  Vitals and nursing note reviewed.   Constitutional:       General: She is not in acute distress.     Appearance: She is well-developed. She is not diaphoretic.   HENT:      Head: Normocephalic and atraumatic.   Eyes:      General:         Right eye: No discharge.         Left eye: No discharge.      Pupils: Pupils are equal, round, and reactive to light.   Cardiovascular:      Rate and Rhythm: Normal rate and regular rhythm.      Heart sounds: No murmur heard.  Pulmonary:      Effort: Pulmonary effort is normal. No respiratory distress.      Breath sounds: Normal breath sounds.   Abdominal:      General: There is no distension.   Musculoskeletal:      Right lower leg: No edema.      Left lower leg: No edema.   Skin:     General: Skin is warm and dry.      Findings: No erythema.      Comments: L radial and L brachial  access site C/D/I; pulses intact, no drainage or hematoma   Neurological:      Mental Status: She is alert and oriented to person, place, and time.   Psychiatric:         Mood and Affect: Mood normal.         Behavior: Behavior normal.            Significant Labs: CMP   Recent Labs   Lab 05/30/24  0600 05/31/24  0509   * 139   K 3.7 4.0    105   CO2 20* 24    93   BUN 24* 19   CREATININE 0.9 0.8   CALCIUM 8.9 9.4   PROT 6.1  --    ALBUMIN 3.4*  --    BILITOT 1.4*  --    ALKPHOS 48*  --    AST 22  --    ALT 12  --    ANIONGAP 11 10   , CBC   Recent Labs   Lab 05/30/24  0600   WBC 13.42*   HGB 13.3   HCT 37.6      , Troponin   Recent Labs   Lab 05/29/24  2355 05/30/24  0600 05/31/24  0509   TROPONINI 0.576* 1.106* 1.678*   , and All pertinent lab results from the last 24 hours have been reviewed.    Significant Imaging: Echocardiogram: Transthoracic echo (TTE) complete (Cupid Only):   Results for orders placed or performed during the hospital encounter of 05/27/24   Echo   Result Value Ref Range    BSA 1.64 m2    LVOT stroke volume 54.11 cm3    LVIDd 4.85 3.5 - 6.0 cm    LV Systolic Volume 68.30 mL    LV Systolic Volume Index 41.6 mL/m2    LVIDs 3.96 2.1 - 4.0 cm    LV Diastolic Volume 110.08 mL    LV Diastolic Volume Index 67.12 mL/m2    IVS 1.46 (A) 0.6 - 1.1 cm    LVOT diameter 1.92 cm    LVOT area 2.9 cm2    FS 18 (A) 28 - 44 %    Left Ventricle Relative Wall Thickness 0.64 cm    Posterior Wall 1.56 (A) 0.6 - 1.1 cm    LV mass 311.23 g    LV Mass Index 190 g/m2    MV Peak E Masood 0.57 m/s    TDI LATERAL 0.04 m/s    TDI SEPTAL 0.05 m/s    E/E' ratio 12.67 m/s    MV Peak A Masood 0.86 m/s    E/A ratio 0.66     IVRT 76.12 msec    E wave deceleration time 227.55 msec    LV SEPTAL E/E' RATIO 11.40 m/s    LV LATERAL E/E' RATIO 14.25 m/s    LVOT peak masood 0.99 m/s    Left Ventricular Outflow Tract Mean Velocity 0.79 cm/s    Left Ventricular Outflow Tract Mean Gradient 2.73 mmHg    RVOT peak VTI 16.6  cm    TAPSE 1.95 cm    LA size 3.25 cm    Left Atrium Minor Axis 5.28 cm    Left Atrium Major Axis 5.16 cm    RA Major Axis 4.11 cm    AV regurgitation pressure 1/2 time 1,106.081878745573524 ms    AR Max Masood 2.29 m/s    AV mean gradient 7 mmHg    AV peak gradient 12 mmHg    Ao peak masood 1.75 m/s    Ao VTI 29.40 cm    LVOT peak VTI 18.70 cm    AV valve area 1.84 cm²    AV Velocity Ratio 0.57     AV index (prosthetic) 0.64     MONIKA by Velocity Ratio 1.64 cm²    MV stenosis pressure 1/2 time 65.99 ms    MV valve area p 1/2 method 3.33 cm2    PV mean gradient 2 mmHg    RVOT peak masood 0.85 m/s    Ao root annulus 3.28 cm    STJ 3.45 cm    Ascending aorta 3.43 cm    IVC diameter 1.44 cm    Mean e' 0.05 m/s    ZLVIDS 2.57     ZLVIDD 0.50     LA Volume Index 26.4 mL/m2    LA volume 43.26 cm3    LA WIDTH 3.0 cm    RA Width 2.8 cm    Est. RA pres 3 mmHg    Narrative      Left Ventricle: The left ventricle is normal in size. Normal wall   thickness. There is concentric hypertrophy. Normal wall motion. There is   moderately reduced systolic function with a visually estimated ejection   fraction of 35 - 40%. Grade I diastolic dysfunction.    Right Ventricle: Normal right ventricular cavity size. Wall thickness   is normal. Right ventricle wall motion  is normal. Systolic function is   normal.    IVC/SVC: Normal venous pressure at 3 mmHg.      and EKG: Reviewed  Assessment and Plan:   Patient who presents with NSTEMI/acute CHF/cardiomyopathy s/p LHC with successful PCI of LAD. 70% LCX disease also noted which will be addressed as OP. Stable/CP free. Continue OMT. ARB switched to Entresto. Follow-up in clinic. Discussed importance of med compliance and smoking cessation.    * NSTEMI (non-ST elevated myocardial infarction)  As above.    5/31/24  -s/p LHC with successful intervention of LAD, 70% LCX disease also noted, OP intervention  -Continue ASA, Effient, BB, statin  -Switch ARB to Entresto  -Follow-up in clinic    CAD, multiple  vessel  As above    New onset of congestive heart failure  -TTE with EF of 35-40%  -Continue po Lasis, ARB, BB  -R/LHC today    5/31/24  -s/p revascularization, meds optimized    Elevated troponin  -Troponin mildly elevated but flat, 0.018>0.031>0.033>0.024  -Elevation secondary to demand ischemia from uncontrolled BP  -ASA  -TTE pending  -If EF normal can f/u as OP    5/29/24  -Remains CP free  -TTE with EF of 35-40%  -Continue ASA, BB, ARB  -R/LHC today by Dr. Mccoy, further rec's to follow    5/31/24  -s/p R/LHC with successful intervention of LAD; 70% LCX disease also noted to be addressed as OP    Elevated brain natriuretic peptide (BNP) level  -Diurese  -TTE pending    5/29/24  -TTE with EF of 35-40%  -R/LHC planned today  -Will optimize regimen post procedure    5/31/24  -BB, Entresto, Lasix prn    Cardiomegaly  -TTE pending    5/31/24  -TTE with EF of 35-40%, + WMA   -s/p LHC with revascularization of LAD  -Entresto, BB, ASA, Effient, statin    Hypertensive emergency  -BP uncontrolled upon admission, in setting of medication non-compliance and recent intake of high salt foods  -Can utilize ARB or CCB, defer to hospital medicine  -TTE pending    5/29/24  -BP improved, continue BB, ARB, po Lasix  -TTE with EF of 35-40%  -R/LHC planned today    5/31/24  -Continue BB  -Switch ARB to Entresto  -Lasix prn upon d/c    Smoker  -Cessation advised        VTE Risk Mitigation (From admission, onward)           Ordered     IP VTE LOW RISK PATIENT  Once         05/28/24 0137     Place sequential compression device  Until discontinued         05/28/24 0137                    Estela Lewis PA-C  Cardiology  [facility]

## 2024-05-31 NOTE — NURSING
IV and cardiac monitor removed, patient tolerated well. Discharge instructions provided and patient verbalized understanding.

## 2024-05-31 NOTE — ASSESSMENT & PLAN NOTE
-TTE pending    5/31/24  -TTE with EF of 35-40%, + WMA   -s/p LHC with revascularization of LAD  -Entresto, BB, ASA, Effient, statin

## 2024-05-31 NOTE — ASSESSMENT & PLAN NOTE
-Diurese  -TTE pending    5/29/24  -TTE with EF of 35-40%  -R/LHC planned today  -Will optimize regimen post procedure    5/31/24  -BB, Entresto, Lasix prn

## 2024-05-31 NOTE — ASSESSMENT & PLAN NOTE
-BP uncontrolled upon admission, in setting of medication non-compliance and recent intake of high salt foods  -Can utilize ARB or CCB, defer to hospital medicine  -TTE pending    5/29/24  -BP improved, continue BB, ARB, po Lasix  -TTE with EF of 35-40%  -R/LHC planned today    5/31/24  -Continue BB  -Switch ARB to Entresto  -Lasix prn upon d/c

## 2024-05-31 NOTE — ASSESSMENT & PLAN NOTE
As above.    5/31/24  -s/p [facility] with successful intervention of LAD, 70% LCX disease also noted, OP intervention  -Continue ASA, Effient, BB, statin  -Switch ARB to Entresto  -Follow-up in clinic

## 2024-05-31 NOTE — PLAN OF CARE
05/31/24 0908   Rounds   Attendance Provider;Physical therapist;;Charge nurse   Discharge Plan A Home   Why the patient remains in the hospital Requires continued medical care   Transition of Care Barriers None

## 2024-05-31 NOTE — SUBJECTIVE & OBJECTIVE
Review of Systems   Constitutional: Negative.   HENT: Negative.     Eyes: Negative.    Cardiovascular: Negative.    Respiratory: Negative.     Endocrine: Negative.    Hematologic/Lymphatic: Negative.    Skin: Negative.    Musculoskeletal: Negative.    Gastrointestinal: Negative.    Genitourinary: Negative.    Neurological: Negative.    Psychiatric/Behavioral: Negative.     Allergic/Immunologic: Negative.      Objective:     Vital Signs (Most Recent):  Temp: 98.8 °F (37.1 °C) (05/31/24 0730)  Pulse: 71 (05/31/24 1455)  Resp: 18 (05/31/24 0730)  BP: (!) 153/81 (05/31/24 0730)  SpO2: 99 % (05/31/24 0730) Vital Signs (24h Range):  Temp:  [98.1 °F (36.7 °C)-98.8 °F (37.1 °C)] 98.8 °F (37.1 °C)  Pulse:  [66-84] 71  Resp:  [18] 18  SpO2:  [94 %-99 %] 99 %  BP: (116-153)/(57-81) 153/81     Weight: 59.9 kg (132 lb)  Body mass index is 22.66 kg/m².     SpO2: 99 %       No intake or output data in the 24 hours ending 05/31/24 1550    Lines/Drains/Airways       None                      Physical Exam  Vitals and nursing note reviewed.   Constitutional:       General: She is not in acute distress.     Appearance: She is well-developed. She is not diaphoretic.   HENT:      Head: Normocephalic and atraumatic.   Eyes:      General:         Right eye: No discharge.         Left eye: No discharge.      Pupils: Pupils are equal, round, and reactive to light.   Cardiovascular:      Rate and Rhythm: Normal rate and regular rhythm.      Heart sounds: No murmur heard.  Pulmonary:      Effort: Pulmonary effort is normal. No respiratory distress.      Breath sounds: Normal breath sounds.   Abdominal:      General: There is no distension.   Musculoskeletal:      Right lower leg: No edema.      Left lower leg: No edema.   Skin:     General: Skin is warm and dry.      Findings: No erythema.      Comments: L radial and L brachial access site C/D/I; pulses intact, no drainage or hematoma   Neurological:      Mental Status: She is alert and  oriented to person, place, and time.   Psychiatric:         Mood and Affect: Mood normal.         Behavior: Behavior normal.            Significant Labs: CMP   Recent Labs   Lab 05/30/24  0600 05/31/24  0509   * 139   K 3.7 4.0    105   CO2 20* 24    93   BUN 24* 19   CREATININE 0.9 0.8   CALCIUM 8.9 9.4   PROT 6.1  --    ALBUMIN 3.4*  --    BILITOT 1.4*  --    ALKPHOS 48*  --    AST 22  --    ALT 12  --    ANIONGAP 11 10   , CBC   Recent Labs   Lab 05/30/24  0600   WBC 13.42*   HGB 13.3   HCT 37.6      , Troponin   Recent Labs   Lab 05/29/24  2355 05/30/24  0600 05/31/24  0509   TROPONINI 0.576* 1.106* 1.678*   , and All pertinent lab results from the last 24 hours have been reviewed.    Significant Imaging: Echocardiogram: Transthoracic echo (TTE) complete (Cupid Only):   Results for orders placed or performed during the hospital encounter of 05/27/24   Echo   Result Value Ref Range    BSA 1.64 m2    LVOT stroke volume 54.11 cm3    LVIDd 4.85 3.5 - 6.0 cm    LV Systolic Volume 68.30 mL    LV Systolic Volume Index 41.6 mL/m2    LVIDs 3.96 2.1 - 4.0 cm    LV Diastolic Volume 110.08 mL    LV Diastolic Volume Index 67.12 mL/m2    IVS 1.46 (A) 0.6 - 1.1 cm    LVOT diameter 1.92 cm    LVOT area 2.9 cm2    FS 18 (A) 28 - 44 %    Left Ventricle Relative Wall Thickness 0.64 cm    Posterior Wall 1.56 (A) 0.6 - 1.1 cm    LV mass 311.23 g    LV Mass Index 190 g/m2    MV Peak E Masood 0.57 m/s    TDI LATERAL 0.04 m/s    TDI SEPTAL 0.05 m/s    E/E' ratio 12.67 m/s    MV Peak A Masood 0.86 m/s    E/A ratio 0.66     IVRT 76.12 msec    E wave deceleration time 227.55 msec    LV SEPTAL E/E' RATIO 11.40 m/s    LV LATERAL E/E' RATIO 14.25 m/s    LVOT peak masood 0.99 m/s    Left Ventricular Outflow Tract Mean Velocity 0.79 cm/s    Left Ventricular Outflow Tract Mean Gradient 2.73 mmHg    RVOT peak VTI 16.6 cm    TAPSE 1.95 cm    LA size 3.25 cm    Left Atrium Minor Axis 5.28 cm    Left Atrium Major Axis 5.16 cm    RA  Major Axis 4.11 cm    AV regurgitation pressure 1/2 time 1,106.497340768892680 ms    AR Max Masood 2.29 m/s    AV mean gradient 7 mmHg    AV peak gradient 12 mmHg    Ao peak masood 1.75 m/s    Ao VTI 29.40 cm    LVOT peak VTI 18.70 cm    AV valve area 1.84 cm²    AV Velocity Ratio 0.57     AV index (prosthetic) 0.64     MONIKA by Velocity Ratio 1.64 cm²    MV stenosis pressure 1/2 time 65.99 ms    MV valve area p 1/2 method 3.33 cm2    PV mean gradient 2 mmHg    RVOT peak masood 0.85 m/s    Ao root annulus 3.28 cm    STJ 3.45 cm    Ascending aorta 3.43 cm    IVC diameter 1.44 cm    Mean e' 0.05 m/s    ZLVIDS 2.57     ZLVIDD 0.50     LA Volume Index 26.4 mL/m2    LA volume 43.26 cm3    LA WIDTH 3.0 cm    RA Width 2.8 cm    Est. RA pres 3 mmHg    Narrative      Left Ventricle: The left ventricle is normal in size. Normal wall   thickness. There is concentric hypertrophy. Normal wall motion. There is   moderately reduced systolic function with a visually estimated ejection   fraction of 35 - 40%. Grade I diastolic dysfunction.    Right Ventricle: Normal right ventricular cavity size. Wall thickness   is normal. Right ventricle wall motion  is normal. Systolic function is   normal.    IVC/SVC: Normal venous pressure at 3 mmHg.      and EKG: Reviewed

## 2024-05-31 NOTE — DISCHARGE SUMMARY
[facility] Medicine  Discharge Summary      Patient Name: Eloisa Camara  MRN: [id number]  ALEXANDRO: [id number]  Patient Class: IP- Inpatient  Admission Date: 5/27/2024  Hospital Length of Stay: 3 days  Discharge Date and Time:  05/31/2024 2:21 PM  Attending Physician: Beny Olvera MD   Discharging Provider: Beny Olvera MD  Primary Care Provider: Quintin Ying MD    Primary Care Team: Networked reference to record PCT     HPI:   Eloisa Camara is a 53 y.o. female with a PMH  has a past medical history of Abnormal Pap smear and Hypertension. Presents as a transfer from our [facility] for Hypertensive emergency and further cardiac workup for tachycardia/palpitations.  Patient reports she she is enjoying the [employer] festival at which time she ate 3 dozen oysters and had a few alcoholic beverages. Patient states that when she woke up this morning she felt her heart beating fast.  Patient's apple watch reported heart rate in the 140s.  Patient reports that her heart rate improved.  Patient states that she did have a she sees coffee with an extra shot earlier today states that she felt her symptoms return.  Patient proceeded to her mother's house to check her blood pressure which was elevated 200s/100s.  Patient proceeded to her PCP's office at which time the following findings were noted.  Patient was ultimately sent to the ER for further evaluation of hypertensive emergency and tachycardia.  See PCP note below.    Note Per Primary Care Provider:  The patient, a 53-year-old female, presents with her , reporting a sudden onset of increased blood pressure starting today. She describes the sensation of her heart racing, which prompted her concern for elevated blood pressure. Objective measurements taken in the clinic confirmed her blood pressure to be critically high at 219/144 mmHg initially, with a subsequent reading of 218/135 mmHg. Her heart rate was noted to be 116 bpm on both occasions. The  patient denies any identifiable cause or precipitating factors for this episode. She reports no recent excessive caffeine intake, anxiety, or other stressors that could potentially explain this acute rise in blood pressure. Interestingly, the patient mentioned consuming three dozen oysters the day before presentation, though she denies any gastrointestinal symptoms such as abdominal pain, nausea, vomiting, or diarrhea, as well as denying any symptoms of chest pain, weakness, or fever. Her past medical history is significant for hypertension, for which she was previously prescribed benazepril 20 mg (last filled in 2017) and hydrochlorothiazide 25 mg (last filled in 2019), but she reports not taking any antihypertensive medications recently. The patient's history of non-compliance with antihypertensive therapy and the sudden, severe elevation in blood pressure are of particular concern.     ER workup at outside facility mostly unremarkable with the exception to Elevated BNP of 333, initial troponin 0.018 with delta troponin of 0.031, TSH of 3.305, drug screen and alcohol serum negative, UA negative, chest x-ray revealing cardiomegaly, an EKG revealing sinus tachycardia with biatrial enlargement left axis deviation and LVH with a ventricular rate of 114 beats per minute and a QT/QTC of 368/507.  Potassium was 3.4 and was treated with 40 mEq of potassium p.o..  Patient's blood pressure reading upon arrival to the ER was 231/135 with heart rate of 119.  Patient received numerous doses of IV hydralazine and labetalol in addition to p.o. clonidine and Norvasc which improved blood pressure reading to 158/88.  Hospital Medicine consulted to admit patient for hypertensive emergency and further cardiac workup.  Patient in agree with the treatment plan.  Patient admitted under observation status.    PCP: Quintin Ying       Procedure(s) (LRB):  CATHETERIZATION, HEART, BOTH LEFT AND RIGHT (N/A)  Percutaneous coronary  intervention (N/A)  LITHOTRIPSY, CORONARY TRANSLUMINAL, PERCUTANEOUS      Hospital Course:    admitted for new onset congestive heart failure. [facility] maternal grandmother  in 60s from congestive heart failure complications. Denies chest pain, dyspnea, nausea/vomiting. Only complaint is leg cramping. Awaiting heart cath per cardiology    status post pci with marleny placement in LAD. Cardiology recommending elective pci to LCX at later time. Transferring out of icu. Continue optimizing medication(s) and monitoring.      After discussing with cardiology, ok to discharge home. Prescriptions delivered to bedside. Questions and concerns addressed.     No acute distress. No respiratory distress. Normal weight. AO3. Dysphoric mood. Tearful     Patient seen and evaluated by me. Patient was determined to be suitable for discharge. Patient deemed stable for discharge to home with nurse practitioner to visit home program.      Goals of Care Treatment Preferences:  Code Status: Full Code      Consults:   Consults (From admission, onward)          Status Ordering Provider     Inpatient consult to Social Work/Case Management  Once        Provider:  (Not yet assigned)    FABRICIO Carroll     Inpatient consult to Cardiology  Once        Provider:  Stephen Paredes, NP    Completed STEPHEN PAREDES            No new Assessment & Plan notes have been filed under this hospital service since the last note was generated.  Service: Hospital Medicine    Final Active Diagnoses:    Diagnosis Date Noted POA    PRINCIPAL PROBLEM:  NSTEMI (non-ST elevated myocardial infarction) [I21.4] 2024 Yes    CAD, multiple vessel [I25.10] 2024 Yes    Hypertensive emergency [I16.1] 2024 Yes    Cardiomegaly [I51.7] 2024 Yes    Elevated brain natriuretic peptide (BNP) level [R79.89] 2024 Yes    Elevated troponin [R79.89] 2024 Yes    New onset of congestive heart failure [I50.9] 2024 Yes     Smoker [F17.200] 01/10/2014 Yes      Problems Resolved During this Admission:    Diagnosis Date Noted Date Resolved POA    Ventricular tachycardia [I47.20] 05/28/2024 05/28/2024 Unknown       Discharged Condition: stable    Disposition:     Follow Up:   Follow-up Information       Quintin Ying MD. Schedule an appointment as soon as possible for a visit in 3 day(s).    Specialty: Family Medicine  Why: hospital follow up  Contact information:  [address] [facility] [facility]  [facility] [address]  [phone]               Quintin Mccoy MD. Schedule an appointment as soon as possible for a visit in 1 week(s).    Specialties: Interventional Cardiology, Cardiology  Why: hospital follow up  Contact information:  [address]  [place] [id number] [phone]               Ene Persaud PA. Schedule an appointment as soon as possible for a visit in 3 day(s).    Specialty: Transplant  Why: hospital follow up  Contact information:  [address] [facility] [address]  [phone]                           Patient Instructions:      ACCEPT - Ambulatory referral/consult to Heart Failure Transitional Care Clinic   Standing Status: Future   Referral Priority: Routine Referral Type: Consultation   Referral Reason: Specialty Services Required   Requested Specialty: Cardiology   Number of Visits Requested: 1     Ambulatory referral/consult to Congestive Heart Failure Clinic   Standing Status: Future   Referral Priority: Routine Referral Type: Consultation   Referral Reason: Specialty Services Required   Requested Specialty: Cardiology   Number of Visits Requested: 1     Diet Cardiac     Activity as tolerated       Significant Diagnostic Studies: Labs: CMP   Recent Labs   Lab 05/30/24  0600 05/31/24  0509   * 139   K 3.7 4.0    105   CO2 20* 24    93   BUN 24* 19   CREATININE 0.9 0.8   CALCIUM 8.9 9.4   PROT 6.1  --    ALBUMIN 3.4*  --    BILITOT 1.4*  --    ALKPHOS 48*  --   "  AST 22  --    ALT 12  --    ANIONGAP 11 10   , CBC   Recent Labs   Lab 05/30/24  0600   WBC 13.42*   HGB 13.3   HCT 37.6      , INR No results found for: "INR", "PROTIME", Lipid Panel No results found for: "CHOL", "HDL", "LDLCALC", "TRIG", "CHOLHDL", Troponin   Recent Labs   Lab 05/29/24  2355 05/30/24  0600 05/31/24  0509   TROPONINI 0.576* 1.106* 1.678*   , A1C: No results for input(s): "HGBA1C" in the last 4320 hours., and All labs within the past 24 hours have been reviewed  Radiology: X-Ray: CXR: portable   Cardiac Graphics: Echocardiogram: Transthoracic echo (TTE) complete (Cupid Only):   Results for orders placed or performed during the hospital encounter of 05/27/24   Echo   Result Value Ref Range    BSA 1.64 m2    LVOT stroke volume 54.11 cm3    LVIDd 4.85 3.5 - 6.0 cm    LV Systolic Volume 68.30 mL    LV Systolic Volume Index 41.6 mL/m2    LVIDs 3.96 2.1 - 4.0 cm    LV Diastolic Volume 110.08 mL    LV Diastolic Volume Index 67.12 mL/m2    IVS 1.46 (A) 0.6 - 1.1 cm    LVOT diameter 1.92 cm    LVOT area 2.9 cm2    FS 18 (A) 28 - 44 %    Left Ventricle Relative Wall Thickness 0.64 cm    Posterior Wall 1.56 (A) 0.6 - 1.1 cm    LV mass 311.23 g    LV Mass Index 190 g/m2    MV Peak E Masood 0.57 m/s    TDI LATERAL 0.04 m/s    TDI SEPTAL 0.05 m/s    E/E' ratio 12.67 m/s    MV Peak A Masood 0.86 m/s    E/A ratio 0.66     IVRT 76.12 msec    E wave deceleration time 227.55 msec    LV SEPTAL E/E' RATIO 11.40 m/s    LV LATERAL E/E' RATIO 14.25 m/s    LVOT peak masood 0.99 m/s    Left Ventricular Outflow Tract Mean Velocity 0.79 cm/s    Left Ventricular Outflow Tract Mean Gradient 2.73 mmHg    RVOT peak VTI 16.6 cm    TAPSE 1.95 cm    LA size 3.25 cm    Left Atrium Minor Axis 5.28 cm    Left Atrium Major Axis 5.16 cm    RA Major Axis 4.11 cm    AV regurgitation pressure 1/2 time 1,106.356766273914525 ms    AR Max Masood 2.29 m/s    AV mean gradient 7 mmHg    AV peak gradient 12 mmHg    Ao peak masood 1.75 m/s    Ao VTI 29.40 " cm    LVOT peak VTI 18.70 cm    AV valve area 1.84 cm²    AV Velocity Ratio 0.57     AV index (prosthetic) 0.64     MONIKA by Velocity Ratio 1.64 cm²    MV stenosis pressure 1/2 time 65.99 ms    MV valve area p 1/2 method 3.33 cm2    PV mean gradient 2 mmHg    RVOT peak jessy 0.85 m/s    Ao root annulus 3.28 cm    STJ 3.45 cm    Ascending aorta 3.43 cm    IVC diameter 1.44 cm    Mean e' 0.05 m/s    ZLVIDS 2.57     ZLVIDD 0.50     LA Volume Index 26.4 mL/m2    LA volume 43.26 cm3    LA WIDTH 3.0 cm    RA Width 2.8 cm    Est. RA pres 3 mmHg    Narrative      Left Ventricle: The left ventricle is normal in size. Normal wall   thickness. There is concentric hypertrophy. Normal wall motion. There is   moderately reduced systolic function with a visually estimated ejection   fraction of 35 - 40%. Grade I diastolic dysfunction.    Right Ventricle: Normal right ventricular cavity size. Wall thickness   is normal. Right ventricle wall motion  is normal. Systolic function is   normal.    IVC/SVC: Normal venous pressure at 3 mmHg.      and cardiac cath     Successful PCI 99% heavily calcified eccentric mid LAD bifurcation lesion, with abrupt closure.  Treated with JAYLEEN x 2 overlapped with 0% residual stenosis.    70% calcified proximal LCX stenosis.    50% calcified mid RCA stenosis.    LVEF 35%.    Recommendations:  Maximal medical therapy for CAD/CHF as tolerated and risk factor modification.  Consider elective PCI LCX in future.    Pending Diagnostic Studies:       Procedure Component Value Units Date/Time    Troponin I #2 [[id number]] Collected: 05/27/24 1623    Order Status: Sent Lab Status: No result     Specimen: Blood            Medications:  Reconciled Home Medications:      Medication List        START taking these medications      aspirin 81 MG EC tablet  Commonly known as: ECOTRIN  Take 1 tablet (81 mg total) by mouth once daily.  Start taking on: June 1, 2024     atorvastatin 80 MG tablet  Commonly known as:  LIPITOR  Take 1 tablet (80 mg total) by mouth every evening.     ENTRESTO 24-26 mg per tablet  Generic drug: sacubitriL-valsartan  Take 1 tablet by mouth 2 (two) times daily.     furosemide 40 MG tablet  Commonly known as: LASIX  Take 1 tablet (40 mg total) by mouth as needed (weight gain, swelling, shortness of breath).     isosorbide mononitrate 30 MG 24 hr tablet  Commonly known as: IMDUR  Take 1 tablet (30 mg total) by mouth once daily.  Start taking on: June 1, 2024     metoprolol succinate 25 MG 24 hr tablet  Commonly known as: TOPROL-XL  Take 1 tablet (25 mg total) by mouth 2 (two) times daily.     prasugreL 10 mg Tab  Commonly known as: EFFIENT  Take 1 tablet (10 mg total) by mouth once daily.  Start taking on: June 1, 2024            CHANGE how you take these medications      potassium chloride 10 MEQ Tbsr  Commonly known as: KLOR-CON  Take 1 tablet (10 mEq total) by mouth as needed (take on the days you take lasix).  What changed: See the new instructions.            CONTINUE taking these medications      clobetasol 0.05% 0.05 % Oint  Commonly known as: TEMOVATE  As needed            STOP taking these medications      hydroCHLOROthiazide 25 MG tablet  Commonly known as: HYDRODIURIL     naproxen 500 MG tablet  Commonly known as: NAPROSYN              Indwelling Lines/Drains at time of discharge:   Lines/Drains/Airways       None                   Time spent on the discharge of patient: 39 minutes         Beny Olvera MD  Department of Hospital Medicine  [facility]

## 2024-05-31 NOTE — PLAN OF CARE
[facility] - Med Surg  Discharge Final Note    Primary Care Provider: Quintin Ying MD    Expected Discharge Date: 5/31/2024    Final Discharge Note (most recent)       Final Note - 05/31/24 1046          Final Note    Assessment Type Final Discharge Note     Anticipated Discharge Disposition Home or Self Care     Hospital Resources/Appts/Education Provided --   pt doesn't have an [facility] pcp                               Contact Info       Quintin Ying MD   Specialty: Family Medicine   Relationship: PCP - General    [address] [place] [address] FAMILY MEDICINE  [facility] [address]   Phone: [phone]       Next Steps: Schedule an appointment as soon as possible for a visit in 3 day(s)    Instructions: hospital follow up    Quintin Mccoy MD   Specialty: Interventional Cardiology, Cardiology    [address]  [place] [address]   Phone: [phone]       Next Steps: Schedule an appointment as soon as possible for a visit in 1 week(s)    Instructions: hospital follow up    Ene Persaud PA   Specialty: Transplant    [address] [facility] [address]   Phone: [phone]       Next Steps: Schedule an appointment as soon as possible for a visit in 3 day(s)    Instructions: hospital follow up

## 2024-05-31 NOTE — ASSESSMENT & PLAN NOTE
-Troponin mildly elevated but flat, 0.018>0.031>0.033>0.024  -Elevation secondary to demand ischemia from uncontrolled BP  -ASA  -TTE pending  -If EF normal can f/u as OP    5/29/24  -Remains CP free  -TTE with EF of 35-40%  -Continue ASA, BB, ARB  -R/LHC today by Dr. Mccoy, further rec's to follow    5/31/24  -s/p R/LHC with successful intervention of LAD; 70% LCX disease also noted to be addressed as OP

## 2024-06-03 ENCOUNTER — TELEPHONE (OUTPATIENT)
Dept: CARDIOLOGY | Facility: CLINIC | Age: 54
End: 2024-06-03
Payer: MEDICAID

## 2024-06-03 NOTE — TELEPHONE ENCOUNTER
Heart Failure Transitional Care Clinic([facility]) hospital discharge 48-72 hour phone follow up completed.     Most Recent Hospital Discharge Date: 5/31/24  Last admission Diagnosis/chief complaint:NSTEMI    TCC nurse Navigator spoke with pt    Current Patient reported weight: 128lbs on home scale today    Current Patient reported blood pressure and heart rate: bp yesterday was 96/66.     Pt reports the following:  []  Shortness of Breath with Activity  []  Shortness of Breath at rest   []  Fatigue  []  Edema   [] Chest pain or tightness  [] Weight Increase since discharge  [x] None of the above    Medications:   Discharge medication reviewed with pt.  Pt reports having medication list available and has all medications at home for use per list.     Education:    Reviewed key points as listed below.     Recommend 2 -3 gram sodium restriction and 1500 cc-2000 cc fluid restriction.  Encourage physical activity with graded exercise program.  Requested patient to weigh themselves daily, and to notify us if their weight increases by more than 3 lbs in 1 day or 5 lbs in 3 days.       Watch for these Signs and Symptoms: If any of these occur, contact HFT[facility] immediately:   Increase in shortness of breath with movement   Increase in swelling in your legs and ankles   Weight gain of more than 3 pounds in a day or 5 pounds in 3 days.   Difficulty breathing when you are lying down   Worsening fatigue or tiredness   Stomach bloating, a full feeling or a loss of appetite   Increased coughing--especially when you are lying down      Pt was able to verbalize back to nurse in their own words correct diet/fluid restrictions, necessity for exercise, warning signs and symptoms, when and how to contact their TCC team.      Pt educated on follow-up plan while in HFT[facility] program to include:   Week 1 -  F/u appt with Provider and HF nurse (Date) pt scheduled with Estela CAMARGO tomorrow 6/4-pt confirmed   Week 2-5 - In person/ Virtual/ phone  call check ins    Week 5-7 - Pt will discharge from H[facility] and transition to longterm care provider (Cardiology/PCP/ Advanced Heart Failure).      Patient active on myChart? Yes      Pt given the following contact information for ease of communication: [phone] (Mon-Fri, 8a-5p) & for urgent issues on the weekend call Janel on-call [phone] to page the Cardiology MD on call.  Pt also encouraged utilize [facility] messaging as well.      Will follow up with pt at first clinic visit and HF nurse navigator available for pt questions, issues or concerns.

## 2024-06-04 ENCOUNTER — OFFICE VISIT (OUTPATIENT)
Dept: CARDIOLOGY | Facility: CLINIC | Age: 54
End: 2024-06-04
Payer: MEDICAID

## 2024-06-04 VITALS
BODY MASS INDEX: 22.28 KG/M2 | HEART RATE: 74 BPM | HEIGHT: 64 IN | WEIGHT: 130.5 LBS | SYSTOLIC BLOOD PRESSURE: 124 MMHG | OXYGEN SATURATION: 99 % | DIASTOLIC BLOOD PRESSURE: 88 MMHG

## 2024-06-04 DIAGNOSIS — I25.5 ISCHEMIC CARDIOMYOPATHY: ICD-10-CM

## 2024-06-04 DIAGNOSIS — I25.10 CAD, MULTIPLE VESSEL: Primary | ICD-10-CM

## 2024-06-04 DIAGNOSIS — I50.9 NEW ONSET OF CONGESTIVE HEART FAILURE: ICD-10-CM

## 2024-06-04 DIAGNOSIS — F17.200 SMOKER: ICD-10-CM

## 2024-06-04 DIAGNOSIS — I21.4 NSTEMI (NON-ST ELEVATED MYOCARDIAL INFARCTION): ICD-10-CM

## 2024-06-04 DIAGNOSIS — R79.89 ELEVATED BRAIN NATRIURETIC PEPTIDE (BNP) LEVEL: ICD-10-CM

## 2024-06-04 DIAGNOSIS — I16.1 HYPERTENSIVE EMERGENCY: ICD-10-CM

## 2024-06-04 PROCEDURE — 1160F RVW MEDS BY RX/DR IN RCRD: CPT | Mod: CPTII,,, | Performed by: PHYSICIAN ASSISTANT

## 2024-06-04 PROCEDURE — 1111F DSCHRG MED/CURRENT MED MERGE: CPT | Mod: CPTII,,, | Performed by: PHYSICIAN ASSISTANT

## 2024-06-04 PROCEDURE — 3079F DIAST BP 80-89 MM HG: CPT | Mod: CPTII,,, | Performed by: PHYSICIAN ASSISTANT

## 2024-06-04 PROCEDURE — 1159F MED LIST DOCD IN RCRD: CPT | Mod: CPTII,,, | Performed by: PHYSICIAN ASSISTANT

## 2024-06-04 PROCEDURE — 99214 OFFICE O/P EST MOD 30 MIN: CPT | Mod: S$PBB,,, | Performed by: PHYSICIAN ASSISTANT

## 2024-06-04 PROCEDURE — 3074F SYST BP LT 130 MM HG: CPT | Mod: CPTII,,, | Performed by: PHYSICIAN ASSISTANT

## 2024-06-04 PROCEDURE — 99214 OFFICE O/P EST MOD 30 MIN: CPT | Mod: PBBFAC | Performed by: PHYSICIAN ASSISTANT

## 2024-06-04 PROCEDURE — 3008F BODY MASS INDEX DOCD: CPT | Mod: CPTII,,, | Performed by: PHYSICIAN ASSISTANT

## 2024-06-04 PROCEDURE — 4010F ACE/ARB THERAPY RXD/TAKEN: CPT | Mod: CPTII,,, | Performed by: PHYSICIAN ASSISTANT

## 2024-06-04 PROCEDURE — 99999 PR PBB SHADOW E&M-EST. PATIENT-LVL IV: CPT | Mod: PBBFAC,,, | Performed by: PHYSICIAN ASSISTANT

## 2024-06-04 RX ORDER — PRASUGREL 10 MG/1
10 TABLET, FILM COATED ORAL DAILY
Qty: 90 TABLET | Refills: 3 | Status: SHIPPED | OUTPATIENT
Start: 2024-06-04

## 2024-06-04 RX ORDER — ASPIRIN 81 MG/1
81 TABLET ORAL DAILY
Qty: 90 TABLET | Refills: 3
Start: 2024-06-04 | End: 2025-06-04

## 2024-06-04 RX ORDER — ATORVASTATIN CALCIUM 80 MG/1
80 TABLET, FILM COATED ORAL NIGHTLY
Qty: 90 TABLET | Refills: 3 | Status: SHIPPED | OUTPATIENT
Start: 2024-06-04 | End: 2025-05-30

## 2024-06-04 RX ORDER — ISOSORBIDE MONONITRATE 30 MG/1
30 TABLET, EXTENDED RELEASE ORAL DAILY
Qty: 90 TABLET | Refills: 3 | Status: SHIPPED | OUTPATIENT
Start: 2024-06-04

## 2024-06-04 RX ORDER — METOPROLOL SUCCINATE 25 MG/1
25 TABLET, EXTENDED RELEASE ORAL 2 TIMES DAILY
Qty: 180 TABLET | Refills: 3 | Status: SHIPPED | OUTPATIENT
Start: 2024-06-04 | End: 2025-05-30

## 2024-06-04 NOTE — PROGRESS NOTES
Subjective   Patient ID:  Eloisa Camara is a 53 y.o. female who presents for follow-up of hospital follow-up    HPI  Ms. Camara is a 53 year old female patient who presents today for follow-up. Patient recently admitted to [facility] with HTN emergency and new onset CHF/cardiomyopathy. Her medications were optimized and she underwent LHC with   complex PCI of heavily calcified LAD, 70% LCX stenosis also noted. Her medications were optimized and she was discharged homewith plans for repeat echo and LHC as OP. She returns today and states she is doing well overall. Reports she felt LH/woozy on Sunday but her BP had dropped with systolic BP in 90's. Denies any CP, SOB, or heaviness. No palpitations, near syncope, or syncope. No s/s suggestive of CHF. BP ok today in clinic. Patient is compliant with her medications. Being much more mindful of her diet. Quit smoking. No longer drinking. No access site complaints other than bruising.    Will plan on 6 week f/u with labs and echo.    Review of Systems   Constitutional: Negative for chills, decreased appetite, fever and malaise/fatigue.   HENT:  Negative for congestion, hoarse voice and sore throat.    Eyes:  Negative for blurred vision and discharge.   Cardiovascular:  Negative for chest pain, claudication, cyanosis, dyspnea on exertion, irregular heartbeat, leg swelling, near-syncope, orthopnea, palpitations and paroxysmal nocturnal dyspnea.   Respiratory:  Negative for cough, hemoptysis, shortness of breath, snoring, sputum production and wheezing.    Endocrine: Negative for cold intolerance and heat intolerance.   Hematologic/Lymphatic: Negative for bleeding problem. Does not bruise/bleed easily.   Skin:  Negative for rash.   Musculoskeletal:  Negative for arthritis, back pain, joint pain, joint swelling, muscle cramps, muscle weakness and myalgias.   Gastrointestinal:  Negative for abdominal pain, constipation, diarrhea, heartburn, melena and nausea.   Genitourinary:   Negative for hematuria.   Neurological:  Negative for dizziness, focal weakness, headaches, light-headedness, loss of balance, numbness, paresthesias, seizures and weakness.   Psychiatric/Behavioral:  Negative for memory loss. The patient does not have insomnia.    Allergic/Immunologic: Negative for hives.          Objective     Physical Exam  Vitals and nursing note reviewed.   Constitutional:       General: She is not in acute distress.     Appearance: Normal appearance. She is well-developed. She is not diaphoretic.   HENT:      Head: Normocephalic and atraumatic.   Eyes:      General:         Right eye: No discharge.         Left eye: No discharge.      Pupils: Pupils are equal, round, and reactive to light.   Neck:      Thyroid: No thyromegaly.      Vascular: No JVD.      Trachea: No tracheal deviation.   Cardiovascular:      Rate and Rhythm: Normal rate and regular rhythm.      Heart sounds: Normal heart sounds, S1 normal and S2 normal. No murmur heard.  Pulmonary:      Effort: Pulmonary effort is normal. No respiratory distress.      Breath sounds: Normal breath sounds.   Abdominal:      General: There is no distension.   Musculoskeletal:      Cervical back: Neck supple.      Right lower leg: No edema.      Left lower leg: No edema.   Skin:     General: Skin is warm and dry.      Findings: No erythema.      Comments: Left radial access site C/D/I; mild bruising, intact pulse  Left brachial access site pusle intact; C/D/I; intact pulse   Neurological:      Mental Status: She is alert and oriented to person, place, and time.   Psychiatric:         Mood and Affect: Mood normal.         Behavior: Behavior normal.       Summary         Successful PCI 99% heavily calcified eccentric mid LAD bifurcation lesion, with abrupt closure.  Treated with JAYLEEN x 2 overlapped with 0% residual stenosis.    70% calcified proximal LCX stenosis.    50% calcified mid RCA stenosis.    LVEF 35%.    Recommendations:  Maximal medical  therapy for CAD/CHF as tolerated and risk factor modification.  Consider elective PCI LCX in future.    TTE Results 5/28/24  Summary  Show Result Comparison     Left Ventricle: The left ventricle is normal in size. Normal wall thickness. There is concentric hypertrophy. Normal wall motion. There is moderately reduced systolic function with a visually estimated ejection fraction of 35 - 40%. Grade I diastolic dysfunction.    Right Ventricle: Normal right ventricular cavity size. Wall thickness is normal. Right ventricle wall motion  is normal. Systolic function is normal.    IVC/SVC: Normal venous pressure at 3 mmHg.     Assessment and Plan     1. CAD, multiple vessel    2. Ischemic cardiomyopathy    3. Elevated brain natriuretic peptide (BNP) level    4. Hypertensive emergency    5. New onset of congestive heart failure    6. NSTEMI (non-ST elevated myocardial infarction)    7. Smoker        Patient who presents for f/u. Doing clinically well. No angina. No evidence of volume overload. Compliant with meds, refills given. Advised to monitor BP at home and call office if on lower side. Continue same regimen for now. Congratulated on smoking cessation.   Plan:  -Continue current medical management and RF modification  -Cardiac low salt diet  -Monitor BP at home, call clinic if any issues  -RTC 6 weeks with Dr. 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

## 2024-06-04 NOTE — PROGRESS NOTES
Subjective   Patient ID:  Eloisa Camara is a 53 y.o. female who presents for follow-up of hospital follow-up    HPI  Mr. Camara is a 53 year old female patient who presents today for follow-up. Patient recently admitted to [facility] with HTN emergency and new onset CHF/cardiomyopathy. Her medications were optimized and she underwent LHC with   complex PCI of heavily calcified LAD, 70% LCX stenosis also noted. Her medications were optimized and she was discharged home with a LifeVest with plans for repeat echo and LHC as OP. She returns today and states     Review of Systems   Constitutional: Negative for chills, decreased appetite, fever and malaise/fatigue.   HENT:  Negative for congestion, hoarse voice and sore throat.    Eyes:  Negative for blurred vision and discharge.   Cardiovascular:  Negative for chest pain, claudication, cyanosis, dyspnea on exertion, irregular heartbeat, leg swelling, near-syncope, orthopnea, palpitations and paroxysmal nocturnal dyspnea.   Respiratory:  Negative for cough, hemoptysis, shortness of breath, snoring, sputum production and wheezing.    Endocrine: Negative for cold intolerance and heat intolerance.   Hematologic/Lymphatic: Negative for bleeding problem. Does not bruise/bleed easily.   Skin:  Negative for rash.   Musculoskeletal:  Negative for arthritis, back pain, joint pain, joint swelling, muscle cramps, muscle weakness and myalgias.   Gastrointestinal:  Negative for abdominal pain, constipation, diarrhea, heartburn, melena and nausea.   Genitourinary:  Negative for hematuria.   Neurological:  Negative for dizziness, focal weakness, headaches, light-headedness, loss of balance, numbness, paresthesias, seizures and weakness.   Psychiatric/Behavioral:  Negative for memory loss. The patient does not have insomnia.    Allergic/Immunologic: Negative for hives.          Objective     Physical Exam  Vitals and nursing note reviewed.   Constitutional:       General: She is not in acute  distress.     Appearance: Normal appearance. She is well-developed. She is not diaphoretic.   HENT:      Head: Normocephalic and atraumatic.   Eyes:      General:         Right eye: No discharge.         Left eye: No discharge.      Pupils: Pupils are equal, round, and reactive to light.   Cardiovascular:      Rate and Rhythm: Normal rate and regular rhythm.      Heart sounds: Normal heart sounds, S1 normal and S2 normal. No murmur heard.  Pulmonary:      Effort: Pulmonary effort is normal. No respiratory distress.      Breath sounds: Normal breath sounds.   Abdominal:      General: There is no distension.   Musculoskeletal:      Right lower leg: No edema.      Left lower leg: No edema.   Skin:     General: Skin is warm and dry.      Findings: No erythema.   Neurological:      Mental Status: She is alert and oriented to person, place, and time.   Psychiatric:         Mood and Affect: Mood normal.         Behavior: Behavior normal.       TTE Results 5/28/24  Summary  Show Result Comparison     Left Ventricle: The left ventricle is normal in size. Normal wall thickness. There is concentric hypertrophy. Normal wall motion. There is moderately reduced systolic function with a visually estimated ejection fraction of 35 - 40%. Grade I diastolic dysfunction.    Right Ventricle: Normal right ventricular cavity size. Wall thickness is normal. Right ventricle wall motion  is normal. Systolic function is normal.    IVC/SVC: Normal venous pressure at 3 mmHg.    [facility] Results 5/29/24  Summary         Successful PCI 99% heavily calcified eccentric mid LAD bifurcation lesion, with abrupt closure.  Treated with JAYLEEN x 2 overlapped with 0% residual stenosis.    70% calcified proximal LCX stenosis.    50% calcified mid RCA stenosis.    LVEF 35%.    Recommendations:  Maximal medical therapy for CAD/CHF as tolerated and risk factor modification.  Consider elective PCI LCX in future.       Assessment and Plan     1. CAD, multiple vessel     2. Ischemic cardiomyopathy    3. Elevated brain natriuretic peptide (BNP) level    4. Hypertensive emergency    5. New onset of congestive heart failure    6. NSTEMI (non-ST elevated myocardial infarction)    7. Smoker        Plan:

## 2024-06-05 VITALS
OXYGEN SATURATION: 99 % | WEIGHT: 132 LBS | HEIGHT: 64 IN | RESPIRATION RATE: 18 BRPM | SYSTOLIC BLOOD PRESSURE: 153 MMHG | HEART RATE: 71 BPM | BODY MASS INDEX: 22.53 KG/M2 | TEMPERATURE: 99 F | DIASTOLIC BLOOD PRESSURE: 81 MMHG

## 2024-06-07 PROBLEM — Z72.0 TOBACCO USE: Status: ACTIVE | Noted: 2024-06-07

## 2024-06-07 PROBLEM — I42.9 CARDIOMYOPATHY: Status: ACTIVE | Noted: 2024-06-07

## 2024-06-07 PROBLEM — R00.2 PALPITATIONS: Status: ACTIVE | Noted: 2024-06-07

## 2024-06-07 PROBLEM — I20.89 STABLE ANGINA PECTORIS: Status: ACTIVE | Noted: 2024-06-07

## 2024-07-10 ENCOUNTER — TELEPHONE (OUTPATIENT)
Dept: CARDIOLOGY | Facility: CLINIC | Age: 54
End: 2024-07-10
Payer: MEDICAID

## 2024-07-10 ENCOUNTER — HOSPITAL ENCOUNTER (OUTPATIENT)
Dept: CARDIOLOGY | Facility: HOSPITAL | Age: 54
Discharge: HOME | End: 2024-07-10
Attending: PHYSICIAN ASSISTANT
Payer: MEDICAID

## 2024-07-10 VITALS
WEIGHT: 130 LBS | BODY MASS INDEX: 22.2 KG/M2 | SYSTOLIC BLOOD PRESSURE: 124 MMHG | DIASTOLIC BLOOD PRESSURE: 88 MMHG | HEIGHT: 64 IN

## 2024-07-10 DIAGNOSIS — F17.200 SMOKER: ICD-10-CM

## 2024-07-10 DIAGNOSIS — I25.10 CAD, MULTIPLE VESSEL: ICD-10-CM

## 2024-07-10 DIAGNOSIS — R79.89 ELEVATED BRAIN NATRIURETIC PEPTIDE (BNP) LEVEL: ICD-10-CM

## 2024-07-10 DIAGNOSIS — I25.5 ISCHEMIC CARDIOMYOPATHY: ICD-10-CM

## 2024-07-10 DIAGNOSIS — I21.4 NSTEMI (NON-ST ELEVATED MYOCARDIAL INFARCTION): ICD-10-CM

## 2024-07-10 LAB
AORTIC ROOT ANNULUS: 2.79 CM
ASCENDING AORTA: 3.19 CM
AV INDEX (PROSTH): 0.75
AV MEAN GRADIENT: 4 MMHG
AV PEAK GRADIENT: 7 MMHG
AV VALVE AREA BY VELOCITY RATIO: 1.81 CM²
AV VALVE AREA: 2.14 CM²
AV VELOCITY RATIO: 0.63
BSA FOR ECHO PROCEDURE: 1.63 M2
CV ECHO LV RWT: 0.61 CM
DOP CALC AO PEAK VEL: 1.33 M/S
DOP CALC AO VTI: 20.9 CM
DOP CALC LVOT AREA: 2.9 CM2
DOP CALC LVOT DIAMETER: 1.91 CM
DOP CALC LVOT PEAK VEL: 0.84 M/S
DOP CALC LVOT STROKE VOLUME: 44.67 CM3
DOP CALC RVOT PEAK VEL: 0.67 M/S
DOP CALC RVOT VTI: 12.6 CM
DOP CALCLVOT PEAK VEL VTI: 15.6 CM
E WAVE DECELERATION TIME: 220.58 MSEC
E/A RATIO: 0.66
E/E' RATIO: 7.47 M/S
ECHO LV POSTERIOR WALL: 1.19 CM (ref 0.6–1.1)
EJECTION FRACTION: 50 %
FRACTIONAL SHORTENING: 23 % (ref 28–44)
INTERVENTRICULAR SEPTUM: 1.33 CM (ref 0.6–1.1)
IVC DIAMETER: 1.5 CM
IVRT: 119.89 MSEC
LA MAJOR: 4.42 CM
LA MINOR: 5.02 CM
LA WIDTH: 3.3 CM
LEFT ATRIUM AREA SYSTOLIC (APICAL 2 CHAMBER): 12.28 CM2
LEFT ATRIUM AREA SYSTOLIC (APICAL 4 CHAMBER): 8.38 CM2
LEFT ATRIUM SIZE: 2.65 CM
LEFT ATRIUM VOLUME INDEX MOD: 12 ML/M2
LEFT ATRIUM VOLUME INDEX: 21.4 ML/M2
LEFT ATRIUM VOLUME MOD: 19.59 CM3
LEFT ATRIUM VOLUME: 34.94 CM3
LEFT INTERNAL DIMENSION IN SYSTOLE: 3.01 CM (ref 2.1–4)
LEFT VENTRICLE DIASTOLIC VOLUME INDEX: 40.45 ML/M2
LEFT VENTRICLE DIASTOLIC VOLUME: 65.93 ML
LEFT VENTRICLE END SYSTOLIC VOLUME APICAL 2 CHAMBER: 24.88 ML
LEFT VENTRICLE END SYSTOLIC VOLUME APICAL 4 CHAMBER: 13.33 ML
LEFT VENTRICLE MASS INDEX: 105 G/M2
LEFT VENTRICLE SYSTOLIC VOLUME INDEX: 21.7 ML/M2
LEFT VENTRICLE SYSTOLIC VOLUME: 35.38 ML
LEFT VENTRICULAR INTERNAL DIMENSION IN DIASTOLE: 3.9 CM (ref 3.5–6)
LEFT VENTRICULAR MASS: 171.4 G
LV LATERAL E/E' RATIO: 8 M/S
LV SEPTAL E/E' RATIO: 7 M/S
LVED V (TEICH): 65.93 ML
LVES V (TEICH): 35.38 ML
LVOT MG: 1.42 MMHG
LVOT MV: 0.55 CM/S
MV PEAK A VEL: 0.85 M/S
MV PEAK E VEL: 0.56 M/S
MV STENOSIS PRESSURE HALF TIME: 63.97 MS
MV VALVE AREA P 1/2 METHOD: 3.44 CM2
OHS CV RV/LV RATIO: 0.86 CM
PISA TR MAX VEL: 2.14 M/S
PULM VEIN S/D RATIO: 1.12
PV MEAN GRADIENT: 1 MMHG
PV MV: 0.58 M/S
PV PEAK D VEL: 0.51 M/S
PV PEAK GRADIENT: 2 MMHG
PV PEAK S VEL: 0.57 M/S
PV PEAK VELOCITY: 0.78 M/S
RA MAJOR: 4.54 CM
RA PRESSURE ESTIMATED: 3 MMHG
RA WIDTH: 3.2 CM
RIGHT VENTRICULAR END-DIASTOLIC DIMENSION: 3.35 CM
RV TB RVSP: 5 MMHG
SINUS: 3.2 CM
STJ: 3.18 CM
TDI LATERAL: 0.07 M/S
TDI SEPTAL: 0.08 M/S
TDI: 0.08 M/S
TR MAX PG: 18 MMHG
TRICUSPID ANNULAR PLANE SYSTOLIC EXCURSION: 1.76 CM
TV REST PULMONARY ARTERY PRESSURE: 21 MMHG
Z-SCORE OF LEFT VENTRICULAR DIMENSION IN END DIASTOLE: -1.65
Z-SCORE OF LEFT VENTRICULAR DIMENSION IN END SYSTOLE: 0.43

## 2024-07-10 PROCEDURE — 93306 TTE W/DOPPLER COMPLETE: CPT | Mod: 26,,, | Performed by: INTERNAL MEDICINE

## 2024-07-10 PROCEDURE — 93306 TTE W/DOPPLER COMPLETE: CPT

## 2024-07-10 NOTE — TELEPHONE ENCOUNTER
Called and spoke with patient and advised results and she voiced understanding.    ----- Message from Estela Lewis PA-C sent at 7/10/2024  2:00 PM CDT -----  Please phone patient. Echo reviewed, EF improved to low normal 50%.     F/u as scheduled.       conversion of non tunneled to tunneled dialysis catheter

## 2024-07-10 NOTE — TELEPHONE ENCOUNTER
Contacted patient regarding results patient stated understanding had no further questions or concerns.  ---- Message from Estela Lewis PA-C sent at 7/10/2024  2:46 PM CDT -----  Labs reviewed. H/H stable. LDL and triglycerides slightly above goal. CMP showed stable kidney function, liver function, and electrolytes. Will discuss further at f/u.

## 2024-07-17 ENCOUNTER — HOSPITAL ENCOUNTER (OUTPATIENT)
Dept: CARDIOLOGY | Facility: HOSPITAL | Age: 54
Discharge: HOME | End: 2024-07-17
Payer: MEDICAID

## 2024-07-17 ENCOUNTER — TELEPHONE (OUTPATIENT)
Dept: CARDIOLOGY | Facility: CLINIC | Age: 54
End: 2024-07-17

## 2024-07-17 ENCOUNTER — OFFICE VISIT (OUTPATIENT)
Dept: CARDIOLOGY | Facility: CLINIC | Age: 54
End: 2024-07-17
Payer: MEDICAID

## 2024-07-17 VITALS
WEIGHT: 131.38 LBS | OXYGEN SATURATION: 99 % | BODY MASS INDEX: 22.43 KG/M2 | HEIGHT: 64 IN | HEART RATE: 90 BPM | DIASTOLIC BLOOD PRESSURE: 88 MMHG | SYSTOLIC BLOOD PRESSURE: 142 MMHG

## 2024-07-17 DIAGNOSIS — R07.89 CHEST PRESSURE: Primary | ICD-10-CM

## 2024-07-17 DIAGNOSIS — I21.4 NSTEMI (NON-ST ELEVATED MYOCARDIAL INFARCTION): ICD-10-CM

## 2024-07-17 DIAGNOSIS — I25.10 CAD, MULTIPLE VESSEL: ICD-10-CM

## 2024-07-17 DIAGNOSIS — I51.7 CARDIOMEGALY: ICD-10-CM

## 2024-07-17 DIAGNOSIS — Z72.0 TOBACCO USE: ICD-10-CM

## 2024-07-17 DIAGNOSIS — Z95.5 S/P CORONARY ARTERY STENT PLACEMENT: ICD-10-CM

## 2024-07-17 DIAGNOSIS — I50.9 NEW ONSET OF CONGESTIVE HEART FAILURE: ICD-10-CM

## 2024-07-17 DIAGNOSIS — I25.5 ISCHEMIC CARDIOMYOPATHY: ICD-10-CM

## 2024-07-17 DIAGNOSIS — I20.89 STABLE ANGINA PECTORIS: ICD-10-CM

## 2024-07-17 PROBLEM — I25.2 HISTORY OF NON-ST ELEVATION MYOCARDIAL INFARCTION (NSTEMI): Status: ACTIVE | Noted: 2024-05-29

## 2024-07-17 LAB
OHS QRS DURATION: 78 MS
OHS QTC CALCULATION: 481 MS

## 2024-07-17 PROCEDURE — 99213 OFFICE O/P EST LOW 20 MIN: CPT | Mod: PBBFAC | Performed by: PHYSICIAN ASSISTANT

## 2024-07-17 PROCEDURE — 93010 ELECTROCARDIOGRAM REPORT: CPT | Mod: ,,, | Performed by: INTERNAL MEDICINE

## 2024-07-17 PROCEDURE — 99214 OFFICE O/P EST MOD 30 MIN: CPT | Mod: S$PBB,25,, | Performed by: PHYSICIAN ASSISTANT

## 2024-07-17 PROCEDURE — 4010F ACE/ARB THERAPY RXD/TAKEN: CPT | Mod: CPTII,,, | Performed by: PHYSICIAN ASSISTANT

## 2024-07-17 PROCEDURE — 99999 PR PBB SHADOW E&M-EST. PATIENT-LVL III: CPT | Mod: PBBFAC,,, | Performed by: PHYSICIAN ASSISTANT

## 2024-07-17 PROCEDURE — 3008F BODY MASS INDEX DOCD: CPT | Mod: CPTII,,, | Performed by: PHYSICIAN ASSISTANT

## 2024-07-17 PROCEDURE — 3079F DIAST BP 80-89 MM HG: CPT | Mod: CPTII,,, | Performed by: PHYSICIAN ASSISTANT

## 2024-07-17 PROCEDURE — 1160F RVW MEDS BY RX/DR IN RCRD: CPT | Mod: CPTII,,, | Performed by: PHYSICIAN ASSISTANT

## 2024-07-17 PROCEDURE — 3077F SYST BP >= 140 MM HG: CPT | Mod: CPTII,,, | Performed by: PHYSICIAN ASSISTANT

## 2024-07-17 PROCEDURE — 93005 ELECTROCARDIOGRAM TRACING: CPT

## 2024-07-17 PROCEDURE — 1159F MED LIST DOCD IN RCRD: CPT | Mod: CPTII,,, | Performed by: PHYSICIAN ASSISTANT

## 2024-07-17 RX ORDER — ISOSORBIDE MONONITRATE 30 MG/1
30 TABLET, EXTENDED RELEASE ORAL 2 TIMES DAILY
Qty: 180 TABLET | Refills: 3 | Status: SHIPPED | OUTPATIENT
Start: 2024-07-17

## 2024-07-17 RX ORDER — NITROGLYCERIN 0.4 MG/1
0.4 TABLET SUBLINGUAL EVERY 5 MIN PRN
Qty: 100 TABLET | Refills: 3 | Status: SHIPPED | OUTPATIENT
Start: 2024-07-17 | End: 2025-07-17

## 2024-07-17 NOTE — PROGRESS NOTES
"Subjective   Patient ID:  Eloisa Camara is a 54 y.o. female who presents for follow-up of CHF/CAD    HPI  Ms. Camara is a 54 year old female whose current medical conditions include CAD s/p recent NSTEMI with PCI of LAD (70% LCX stenosis also noted), ICM, HTN, hyperlipidemia, and tobacco abuse who presents today for follow-up. She returns today and states she is doing ok. Reports she has been "off past two days". Started noticing substernal chest pressure yesterday while seated at her desk. Lasted off and on but more persistent this AM, currently with discomfort in office. No radiation of pain. No associated SOB, palpitations, near syncope, or syncope. She feels symptoms may be due to stress. Does not feel pressure is related to exertion. No s/s suggestive of CHF, takes Lasix prn, last took dose this AM. She has been compliant with her medications, on ASA and Effient. Very mindful of her salt intake. Occasionally smoking. Feels BP elevated today due to stress.     Labs reviewed. CMP stable, electrolytes WNL. Lipids reviewed. LDL 83, trig 196.     EKG today shows SR, possile LAE, prolonged QT. Will recheck BMP and Mg level given prolonged QT.     TTE with improved EF to 50%, +WMA.     Review of Systems   Constitutional: Positive for malaise/fatigue. Negative for chills, decreased appetite and fever.   HENT:  Negative for congestion, hoarse voice and sore throat.    Eyes:  Negative for blurred vision and discharge.   Cardiovascular:  Positive for chest pain (pressure). Negative for claudication, cyanosis, dyspnea on exertion, irregular heartbeat, leg swelling, near-syncope, orthopnea, palpitations and paroxysmal nocturnal dyspnea.   Respiratory:  Negative for cough, hemoptysis, shortness of breath, snoring, sputum production and wheezing.    Endocrine: Negative for cold intolerance and heat intolerance.   Hematologic/Lymphatic: Negative for bleeding problem. Does not bruise/bleed easily.   Skin:  Negative for rash. " "  Musculoskeletal:  Negative for arthritis, back pain, joint pain, joint swelling, muscle cramps, muscle weakness and myalgias.   Gastrointestinal:  Negative for abdominal pain, constipation, diarrhea, heartburn, melena and nausea.   Genitourinary:  Negative for hematuria.   Neurological:  Negative for dizziness, focal weakness, headaches, light-headedness, loss of balance, numbness, paresthesias, seizures and weakness.   Psychiatric/Behavioral:  Negative for memory loss. The patient is nervous/anxious. The patient does not have insomnia.    Allergic/Immunologic: Negative for hives.     BP (!) 142/88 (BP Location: Left arm, Patient Position: Sitting)   Pulse 90   Ht 5' 4" (1.626 m)   Wt 59.6 kg (131 lb 6.3 oz)   LMP 01/26/2014   SpO2 99%   BMI 22.55 kg/m²        Objective     Physical Exam  Vitals and nursing note reviewed.   Constitutional:       General: She is not in acute distress.     Appearance: Normal appearance. She is well-developed. She is not diaphoretic.   HENT:      Head: Normocephalic and atraumatic.   Eyes:      General:         Right eye: No discharge.         Left eye: No discharge.      Pupils: Pupils are equal, round, and reactive to light.   Cardiovascular:      Rate and Rhythm: Normal rate and regular rhythm.      Heart sounds: Normal heart sounds, S1 normal and S2 normal. No murmur heard.  Pulmonary:      Effort: Pulmonary effort is normal. No respiratory distress.      Breath sounds: Normal breath sounds.   Abdominal:      General: There is no distension.   Musculoskeletal:      Right lower leg: No edema.      Left lower leg: No edema.   Skin:     General: Skin is warm and dry.      Findings: No erythema.   Neurological:      General: No focal deficit present.      Mental Status: She is alert and oriented to person, place, and time.   Psychiatric:         Mood and Affect: Mood normal.         Behavior: Behavior normal.       Cath Results 5/29/24  Summary         Successful PCI 99% " "heavily calcified eccentric mid LAD bifurcation lesion, with abrupt closure.  Treated with JAYLEEN x 2 overlapped with 0% residual stenosis.    70% calcified proximal LCX stenosis.    50% calcified mid RCA stenosis.    LVEF 35%.    Recommendations:  Maximal medical therapy for CAD/CHF as tolerated and risk factor modification.  Consider elective PCI LCX in future.    TTE Results 7/10/24  Summary  Show Result Comparison     Left Ventricle: The left ventricle is normal in size. Normal wall thickness. There is concentric hypertrophy. Normal wall motion. See diagram for wall motion findings. There is normal diastolic function.    Right Ventricle: Normal right ventricular cavity size. Wall thickness is normal. Systolic function is normal.    Left Atrium: Normal left atrial size.    Pulmonary Artery: The estimated pulmonary artery systolic pressure is 21 mmHg.    IVC/SVC: Normal venous pressure at 3 mmHg.         Chemistry        Component Value Date/Time     07/10/2024 0843    K 4.5 07/10/2024 0843     07/10/2024 0843    CO2 29 07/10/2024 0843    BUN 18 07/10/2024 0843    CREATININE 0.9 07/10/2024 0843     07/10/2024 0843        Component Value Date/Time    CALCIUM 9.9 07/10/2024 0843    ALKPHOS 59 07/10/2024 0843    AST 29 07/10/2024 0843    ALT 39 07/10/2024 0843    BILITOT 0.9 07/10/2024 0843    ESTGFRAFRICA >60.0 02/13/2014 1200    EGFRNONAA >60.0 02/13/2014 1200          Lab Results   Component Value Date    CHOL 166 07/10/2024     Lab Results   Component Value Date    HDL 43 07/10/2024     Lab Results   Component Value Date    LDLCALC 83.8 07/10/2024     Lab Results   Component Value Date    TRIG 196 (H) 07/10/2024       Lab Results   Component Value Date    CHOLHDL 25.9 07/10/2024     No results found for: "LABA1C", "HGBA1C"    Assessment and Plan     1. Chest pressure    2. CAD, multiple vessel    3. New onset of congestive heart failure    4. Tobacco use    5. Cardiomegaly    6. Ischemic " cardiomyopathy    7. Stable angina pectoris    8. S/P coronary artery stent placement    9. NSTEMI (non-ST elevated myocardial infarction)      Patient presents for f/u. Doing ok. Reports substernal CP, more persistent this AM, ongoing in office but had improved by end of visit. Given history and symptoms, she was advised to proceed to ED for further workup. She declined, felt symptoms likely due to stress, aware of risks. Increase Imdur to 30 mg BID. Continue other CV meds/mgmt. Sublingual nitro given and counseled on usage. Agreeable to troponin level today.   Plan:  -Troponin, CMP, Mg level with phone review given prolonged QT interval  -Increase Imdur to 30 mg BID  -Sublingual nitro given, counseled on indications and usage  -Declined ED evaluation, aware of risks  -Will message Dr. Mccoy regarding LCX intervention  -Follow-up TBA     Visit today included increased complexity associated with the care of the episodic problem chest pain/CAD addressed and managing the longitudinal care of the patient due to the serious and/or complex managed problem(s).

## 2024-07-17 NOTE — TELEPHONE ENCOUNTER
Called [phone] and advised the test results and she voiced understanding.       ----- Message from Estela Lewis PA-C sent at 7/17/2024  2:00 PM CDT -----  Labs reviewed. Creatinine stable and K WNL. Magnesium WNL.    Troponin normal however would still recommend ED if any recurrent CP symptoms. I have messaged Dr. Mccoy regarding repeat cath/intervention.

## 2024-07-19 ENCOUNTER — TELEPHONE (OUTPATIENT)
Dept: CARDIOLOGY | Facility: CLINIC | Age: 54
End: 2024-07-19
Payer: MEDICAID

## 2024-07-19 RX ORDER — FAMOTIDINE 40 MG/1
40 TABLET, FILM COATED ORAL
COMMUNITY
End: 2024-08-06 | Stop reason: SDUPTHER

## 2024-07-19 RX ORDER — DIPHENHYDRAMINE HCL 50 MG
50 CAPSULE ORAL
COMMUNITY
End: 2024-08-06 | Stop reason: SDUPTHER

## 2024-07-19 RX ORDER — PREDNISONE 50 MG/1
50 TABLET ORAL
COMMUNITY
End: 2024-08-06 | Stop reason: SDUPTHER

## 2024-07-19 NOTE — TELEPHONE ENCOUNTER
Telephoned patient to discuss/confirm scheduling heart cath on 8/8 with Dr. Mccoy  Reviewed all instructions, allergies and need for Pre Med, confirmed her Pharmacy and answered all questions  Sent portal message too      ----- Message from Quintin Mccoy MD sent at 7/18/2024  7:18 PM CDT -----  Maricruz,     Please schedule thurs Aug 8th 0730 start time in cath lab 1.    Please move the 8 am pt Stone to start 0900 time --- currently 8 am start but there is IR case in lab 3 at 7 am and I want to start Jax at 730.      Thanks    Dr Mccoy  ----- Message -----  From: Estela Lewis, PA-C  Sent: 7/17/2024  11:32 AM CDT  To: Quintin Mccoy MD    Please see my note from today    Echo/labs also in chart---Likely needs LCX intervention

## 2024-08-06 DIAGNOSIS — I25.10 CAD, MULTIPLE VESSEL: Primary | ICD-10-CM

## 2024-08-06 DIAGNOSIS — Z88.8 ALLERGY TO IODINE: ICD-10-CM

## 2024-08-06 RX ORDER — PREDNISONE 50 MG/1
TABLET ORAL
Qty: 3 TABLET | Refills: 0 | Status: ON HOLD | OUTPATIENT
Start: 2024-08-06 | End: 2024-08-09 | Stop reason: HOSPADM

## 2024-08-06 RX ORDER — FAMOTIDINE 40 MG/1
TABLET, FILM COATED ORAL
Qty: 3 TABLET | Refills: 0 | Status: ON HOLD | OUTPATIENT
Start: 2024-08-06 | End: 2024-08-09 | Stop reason: HOSPADM

## 2024-08-06 RX ORDER — DIPHENHYDRAMINE HCL 50 MG
CAPSULE ORAL
Qty: 3 CAPSULE | Refills: 0 | Status: ON HOLD | OUTPATIENT
Start: 2024-08-06 | End: 2024-08-09 | Stop reason: HOSPADM

## 2024-08-08 ENCOUNTER — HOSPITAL ENCOUNTER (OUTPATIENT)
Facility: HOSPITAL | Age: 54
Discharge: HOME | End: 2024-08-09
Attending: INTERNAL MEDICINE | Admitting: INTERNAL MEDICINE
Payer: MEDICAID

## 2024-08-08 DIAGNOSIS — I42.9 CARDIOMYOPATHY, UNSPECIFIED TYPE: ICD-10-CM

## 2024-08-08 DIAGNOSIS — Z95.5 S/P CORONARY ARTERY STENT PLACEMENT: Primary | ICD-10-CM

## 2024-08-08 DIAGNOSIS — I50.9 NEW ONSET OF CONGESTIVE HEART FAILURE: ICD-10-CM

## 2024-08-08 DIAGNOSIS — M79.89 LEFT ARM SWELLING: ICD-10-CM

## 2024-08-08 DIAGNOSIS — I25.10 CAD, MULTIPLE VESSEL: ICD-10-CM

## 2024-08-08 DIAGNOSIS — Z95.5 HISTORY OF CORONARY ANGIOPLASTY WITH INSERTION OF STENT: ICD-10-CM

## 2024-08-08 DIAGNOSIS — I25.2 HISTORY OF NON-ST ELEVATION MYOCARDIAL INFARCTION (NSTEMI): ICD-10-CM

## 2024-08-08 DIAGNOSIS — I20.9 ANGINA PECTORIS: ICD-10-CM

## 2024-08-08 LAB
POC ACTIVATED CLOTTING TIME K: 214 SEC (ref 74–137)
POC ACTIVATED CLOTTING TIME K: 244 SEC (ref 74–137)
POC ACTIVATED CLOTTING TIME K: 262 SEC (ref 74–137)
POC ACTIVATED CLOTTING TIME K: 311 SEC (ref 74–137)
POCT GLUCOSE: 139 MG/DL (ref 70–110)
SAMPLE: ABNORMAL

## 2024-08-08 PROCEDURE — C9600 PERC DRUG-EL COR STENT SING: HCPCS | Mod: LC | Performed by: INTERNAL MEDICINE

## 2024-08-08 PROCEDURE — 25000003 PHARM REV CODE 250: Performed by: INTERNAL MEDICINE

## 2024-08-08 PROCEDURE — C1887 CATHETER, GUIDING: HCPCS | Performed by: INTERNAL MEDICINE

## 2024-08-08 PROCEDURE — 99152 MOD SED SAME PHYS/QHP 5/>YRS: CPT | Mod: ,,, | Performed by: INTERNAL MEDICINE

## 2024-08-08 PROCEDURE — 63600175 PHARM REV CODE 636 W HCPCS: Performed by: INTERNAL MEDICINE

## 2024-08-08 PROCEDURE — 25500020 PHARM REV CODE 255: Performed by: INTERNAL MEDICINE

## 2024-08-08 PROCEDURE — C1725 CATH, TRANSLUMIN NON-LASER: HCPCS | Performed by: INTERNAL MEDICINE

## 2024-08-08 PROCEDURE — C1769 GUIDE WIRE: HCPCS | Performed by: INTERNAL MEDICINE

## 2024-08-08 PROCEDURE — 99152 MOD SED SAME PHYS/QHP 5/>YRS: CPT | Performed by: INTERNAL MEDICINE

## 2024-08-08 PROCEDURE — 93458 L HRT ARTERY/VENTRICLE ANGIO: CPT | Mod: XU | Performed by: INTERNAL MEDICINE

## 2024-08-08 PROCEDURE — 93005 ELECTROCARDIOGRAM TRACING: CPT | Mod: 59

## 2024-08-08 PROCEDURE — 85347 COAGULATION TIME ACTIVATED: CPT | Performed by: INTERNAL MEDICINE

## 2024-08-08 PROCEDURE — 27201423 OPTIME MED/SURG SUP & DEVICES STERILE SUPPLY: Performed by: INTERNAL MEDICINE

## 2024-08-08 PROCEDURE — 93010 ELECTROCARDIOGRAM REPORT: CPT | Mod: ,,, | Performed by: INTERNAL MEDICINE

## 2024-08-08 PROCEDURE — C1874 STENT, COATED/COV W/DEL SYS: HCPCS | Performed by: INTERNAL MEDICINE

## 2024-08-08 PROCEDURE — 92928 PRQ TCAT PLMT NTRAC ST 1 LES: CPT | Mod: LC,,, | Performed by: INTERNAL MEDICINE

## 2024-08-08 PROCEDURE — 99153 MOD SED SAME PHYS/QHP EA: CPT | Performed by: INTERNAL MEDICINE

## 2024-08-08 PROCEDURE — 93458 L HRT ARTERY/VENTRICLE ANGIO: CPT | Mod: 26,XU,51, | Performed by: INTERNAL MEDICINE

## 2024-08-08 PROCEDURE — C1894 INTRO/SHEATH, NON-LASER: HCPCS | Performed by: INTERNAL MEDICINE

## 2024-08-08 DEVICE — IMPLANTABLE DEVICE
Type: IMPLANTABLE DEVICE | Site: CORONARY | Status: FUNCTIONAL
Brand: ORSIRO MISSION

## 2024-08-08 RX ORDER — PRASUGREL 10 MG/1
10 TABLET, FILM COATED ORAL DAILY
Status: DISCONTINUED | OUTPATIENT
Start: 2024-08-09 | End: 2024-08-09 | Stop reason: HOSPADM

## 2024-08-08 RX ORDER — HYDRALAZINE HYDROCHLORIDE 20 MG/ML
10 INJECTION INTRAMUSCULAR; INTRAVENOUS EVERY 4 HOURS PRN
Status: DISCONTINUED | OUTPATIENT
Start: 2024-08-08 | End: 2024-08-09 | Stop reason: HOSPADM

## 2024-08-08 RX ORDER — LIDOCAINE HYDROCHLORIDE 20 MG/ML
INJECTION, SOLUTION INFILTRATION; PERINEURAL
Status: DISCONTINUED | OUTPATIENT
Start: 2024-08-08 | End: 2024-08-08 | Stop reason: HOSPADM

## 2024-08-08 RX ORDER — ZOLPIDEM TARTRATE 5 MG/1
5 TABLET ORAL NIGHTLY PRN
Status: DISCONTINUED | OUTPATIENT
Start: 2024-08-08 | End: 2024-08-09 | Stop reason: HOSPADM

## 2024-08-08 RX ORDER — DIAZEPAM 5 MG/1
5 TABLET ORAL
Status: COMPLETED | OUTPATIENT
Start: 2024-08-08 | End: 2024-08-08

## 2024-08-08 RX ORDER — ATORVASTATIN CALCIUM 40 MG/1
80 TABLET, FILM COATED ORAL NIGHTLY
Status: DISCONTINUED | OUTPATIENT
Start: 2024-08-08 | End: 2024-08-09 | Stop reason: HOSPADM

## 2024-08-08 RX ORDER — ISOSORBIDE MONONITRATE 30 MG/1
30 TABLET, EXTENDED RELEASE ORAL NIGHTLY
Status: DISCONTINUED | OUTPATIENT
Start: 2024-08-08 | End: 2024-08-08

## 2024-08-08 RX ORDER — ASPIRIN 81 MG/1
81 TABLET ORAL DAILY
Status: DISCONTINUED | OUTPATIENT
Start: 2024-08-09 | End: 2024-08-09 | Stop reason: HOSPADM

## 2024-08-08 RX ORDER — SODIUM CHLORIDE 9 MG/ML
INJECTION, SOLUTION INTRAVENOUS CONTINUOUS
Status: DISCONTINUED | OUTPATIENT
Start: 2024-08-08 | End: 2024-08-08

## 2024-08-08 RX ORDER — FENTANYL CITRATE 50 UG/ML
INJECTION, SOLUTION INTRAMUSCULAR; INTRAVENOUS
Status: DISCONTINUED | OUTPATIENT
Start: 2024-08-08 | End: 2024-08-08 | Stop reason: HOSPADM

## 2024-08-08 RX ORDER — ALPRAZOLAM 0.25 MG/1
0.25 TABLET ORAL 3 TIMES DAILY PRN
Status: DISCONTINUED | OUTPATIENT
Start: 2024-08-08 | End: 2024-08-09 | Stop reason: HOSPADM

## 2024-08-08 RX ORDER — NITROGLYCERIN 5 MG/ML
INJECTION, SOLUTION INTRAVENOUS
Status: DISCONTINUED | OUTPATIENT
Start: 2024-08-08 | End: 2024-08-08 | Stop reason: HOSPADM

## 2024-08-08 RX ORDER — MIDAZOLAM HYDROCHLORIDE 1 MG/ML
INJECTION, SOLUTION INTRAMUSCULAR; INTRAVENOUS
Status: DISCONTINUED | OUTPATIENT
Start: 2024-08-08 | End: 2024-08-08 | Stop reason: HOSPADM

## 2024-08-08 RX ORDER — MORPHINE SULFATE 4 MG/ML
3 INJECTION, SOLUTION INTRAMUSCULAR; INTRAVENOUS EVERY 4 HOURS PRN
Status: DISCONTINUED | OUTPATIENT
Start: 2024-08-08 | End: 2024-08-09 | Stop reason: HOSPADM

## 2024-08-08 RX ORDER — ASPIRIN 325 MG
325 TABLET ORAL ONCE
Status: COMPLETED | OUTPATIENT
Start: 2024-08-08 | End: 2024-08-08

## 2024-08-08 RX ORDER — ONDANSETRON HYDROCHLORIDE 2 MG/ML
INJECTION, SOLUTION INTRAVENOUS
Status: DISCONTINUED | OUTPATIENT
Start: 2024-08-08 | End: 2024-08-08 | Stop reason: HOSPADM

## 2024-08-08 RX ORDER — HYDROMORPHONE HCL 2 MG/ML
VIAL (ML) INJECTION
Status: DISCONTINUED | OUTPATIENT
Start: 2024-08-08 | End: 2024-08-08 | Stop reason: HOSPADM

## 2024-08-08 RX ORDER — SODIUM CHLORIDE 0.9 % (FLUSH) 0.9 %
10 SYRINGE (ML) INJECTION
Status: DISCONTINUED | OUTPATIENT
Start: 2024-08-08 | End: 2024-08-09 | Stop reason: HOSPADM

## 2024-08-08 RX ORDER — ISOSORBIDE MONONITRATE 60 MG/1
60 TABLET, EXTENDED RELEASE ORAL 2 TIMES DAILY
Status: DISCONTINUED | OUTPATIENT
Start: 2024-08-08 | End: 2024-08-09 | Stop reason: HOSPADM

## 2024-08-08 RX ORDER — NITROGLYCERIN 0.4 MG/1
0.4 TABLET SUBLINGUAL EVERY 5 MIN PRN
Status: DISCONTINUED | OUTPATIENT
Start: 2024-08-08 | End: 2024-08-09 | Stop reason: HOSPADM

## 2024-08-08 RX ORDER — HEPARIN SODIUM 1000 [USP'U]/ML
INJECTION, SOLUTION INTRAVENOUS; SUBCUTANEOUS
Status: DISCONTINUED | OUTPATIENT
Start: 2024-08-08 | End: 2024-08-08 | Stop reason: HOSPADM

## 2024-08-08 RX ORDER — ONDANSETRON 8 MG/1
8 TABLET, ORALLY DISINTEGRATING ORAL EVERY 8 HOURS PRN
Status: DISCONTINUED | OUTPATIENT
Start: 2024-08-08 | End: 2024-08-09 | Stop reason: HOSPADM

## 2024-08-08 RX ORDER — SODIUM CHLORIDE 9 MG/ML
INJECTION, SOLUTION INTRAVENOUS CONTINUOUS
Status: DISCONTINUED | OUTPATIENT
Start: 2024-08-08 | End: 2024-08-09

## 2024-08-08 RX ORDER — DIPHENHYDRAMINE HCL 50 MG
50 CAPSULE ORAL ONCE
Status: COMPLETED | OUTPATIENT
Start: 2024-08-08 | End: 2024-08-08

## 2024-08-08 RX ORDER — HYDROCODONE BITARTRATE AND ACETAMINOPHEN 5; 325 MG/1; MG/1
1 TABLET ORAL EVERY 4 HOURS PRN
Status: DISCONTINUED | OUTPATIENT
Start: 2024-08-08 | End: 2024-08-09 | Stop reason: HOSPADM

## 2024-08-08 RX ORDER — VERAPAMIL HYDROCHLORIDE 2.5 MG/ML
INJECTION, SOLUTION INTRAVENOUS
Status: DISCONTINUED | OUTPATIENT
Start: 2024-08-08 | End: 2024-08-08 | Stop reason: HOSPADM

## 2024-08-08 RX ORDER — METOPROLOL SUCCINATE 25 MG/1
25 TABLET, EXTENDED RELEASE ORAL 2 TIMES DAILY
Status: DISCONTINUED | OUTPATIENT
Start: 2024-08-08 | End: 2024-08-09 | Stop reason: HOSPADM

## 2024-08-08 RX ADMIN — DIAZEPAM 5 MG: 5 TABLET ORAL at 07:08

## 2024-08-08 RX ADMIN — SODIUM CHLORIDE: 9 INJECTION, SOLUTION INTRAVENOUS at 07:08

## 2024-08-08 RX ADMIN — SACUBITRIL AND VALSARTAN 1 TABLET: 24; 26 TABLET, FILM COATED ORAL at 06:08

## 2024-08-08 RX ADMIN — DIPHENHYDRAMINE HYDROCHLORIDE 50 MG: 50 CAPSULE ORAL at 07:08

## 2024-08-08 RX ADMIN — ISOSORBIDE MONONITRATE 60 MG: 60 TABLET, EXTENDED RELEASE ORAL at 06:08

## 2024-08-08 RX ADMIN — ATORVASTATIN CALCIUM 80 MG: 40 TABLET, FILM COATED ORAL at 08:08

## 2024-08-08 RX ADMIN — METOPROLOL SUCCINATE 25 MG: 25 TABLET, EXTENDED RELEASE ORAL at 08:08

## 2024-08-08 RX ADMIN — SODIUM CHLORIDE: 9 INJECTION, SOLUTION INTRAVENOUS at 02:08

## 2024-08-08 RX ADMIN — ASPIRIN 325 MG ORAL TABLET 325 MG: 325 PILL ORAL at 07:08

## 2024-08-08 NOTE — OP NOTE
[facility] - Cath Lab ([facility])  Cardiac Catheterization  Procedure Note    SUMMARY     Eloisa Camara  [id number]  Stefany Fregoso NP    Date of Procedure: 8/8/2024    Procedure:  1. LHC 2. CORONARY ANGIOGRAM  3.  PCI LCX    Provider: Quintin Mccoy MD    Indications: She was referred for staged PCI LCX s/p NSTEMI.    Pre-Procedure Diagnosis:  Multivessel CAD, s/p NSTEMI.    Post-Procedure Diagnosis:  PCI LCX    Anesthesia: RN IV Sedation    Description of the Findings of the Procedure:     The risks, benefits, complications, treatment options, and expected outcomes were discussed with the patient. The patient and/or family concurred with the proposed plan, giving informed consent. Patient was brought to the cath lab after IV hydration was begun and oral premedication was given.     Findings:    PCI performed of calcified tortuous proximal-mid LCX 80% stenosis.  LCX is calcified proximally and tortuous with mild disease near ostial segment.  There is 80% stenosis just after a bend in proximal to mid segment.  The distal segment is tortuous, nonobstructive disease 40 - 50%.  Difficult intervention due to calcification and tortuosity; 1.20 mm OTW balloon would not cross through segment.  Allstar wire delivered w Fine Cross microcatheter.  2.75 x 18 mm Orsiro JAYLEEN x one able to be delivered with 0% residual stenosis.  The stented segment looked optimal on final views; however, there was ostial lesion then noted and this improved some with intracoronary ntg and final estimation was 50 - 70% stenosis.  Decision was made to treat this lesion medically for now (also due to the length of time it took to intervene on the  prox-mid segment and increasing fluoro time and contrast load).    Suspect this ostial lesion may improve w nitrates/med tx.  PCI can be considered in future as clinically indicated.    LAD: widely patent overlapped stents mid to distal segment.  RCA calcified diffusely diseased vessel with 60% mid  stenosis.    LVEDP 12.    Left radial TR band.    Also given Solumedrol in cath lab for throat itching, leg sensation like a rash could develop since she has iodine allergy -- was premedicated.    See report.      Complications: None; patient tolerated the procedure well.    Estimated Blood Loss (EBL): Minimal           Implants: JAYLEEN x one    Specimens: none    Condition: stable    Disposition: PACU - hemodynamically stable.    Attestation: I was present and scrubbed for the entire procedure.    Recommendations:    Routine PCI care.  Will admit to telemetry overnight for observation.  Solumedrol prn for any concerning iodine allergy reaction.  Increase Imdur to 60 mg bid.  OMT for CAD.  DAPT.  Continue smoking cessation.  Dc home in am.

## 2024-08-08 NOTE — NURSING
All air now removed from L TR Band.  Site w/bruising noted.  No hematoma.  Gauze dsg/clear plastic tape to secure and splint reapplied.  Inst gigven to patient and her  of site and when to remove dsg.  Verbalized understanding

## 2024-08-08 NOTE — NURSING
Rec'd to [facility]  A & O X 3  Cooperative/Communicative  Neuro intact.  L TR Band w/splint in place.  Site benign at present

## 2024-08-08 NOTE — PLAN OF CARE
L wrist pulse present, +2. No bleeding or hematoma. Updated patient on plan of care. Instructed patient to use call light for assistance, call light in reach. Hourly rounding performed. Vitals q4 hours. Education provided, questions answered/encouraged. Chart check complete.     Problem: Adult Inpatient Plan of Care  Goal: Plan of Care Review  Outcome: Progressing  Goal: Patient-Specific Goal (Individualized)  Outcome: Progressing  Goal: Absence of Hospital-Acquired Illness or Injury  Outcome: Progressing  Goal: Optimal Comfort and Wellbeing  Outcome: Progressing  Goal: Readiness for Transition of Care  Outcome: Progressing     Problem: Fall Injury Risk  Goal: Absence of Fall and Fall-Related Injury  Outcome: Progressing

## 2024-08-08 NOTE — NURSING
Report called to Jacki VICK    S/P Heart cath w/fix  NS at 75/hr cont  NSR per cardiac monitor  See flow sheet for VS       Stable    Dsg to L wrist c,d,I w/splint in place.  Patient has been educated on care of l wrist    Bedrest completed   at bedside    Plz note iodine allergy.  Complained about throat itching in cath lab and received dose of Solu medrol.  No problems/complaint while in recovery.

## 2024-08-09 VITALS
RESPIRATION RATE: 16 BRPM | DIASTOLIC BLOOD PRESSURE: 92 MMHG | TEMPERATURE: 98 F | SYSTOLIC BLOOD PRESSURE: 180 MMHG | HEIGHT: 64 IN | OXYGEN SATURATION: 98 % | BODY MASS INDEX: 22.55 KG/M2 | HEART RATE: 86 BPM

## 2024-08-09 LAB
ALBUMIN SERPL BCP-MCNC: 3.4 G/DL (ref 3.5–5.2)
ALP SERPL-CCNC: 49 U/L (ref 55–135)
ALT SERPL W/O P-5'-P-CCNC: 18 U/L (ref 10–44)
ANION GAP SERPL CALC-SCNC: 12 MMOL/L (ref 8–16)
AST SERPL-CCNC: 14 U/L (ref 10–40)
BASOPHILS # BLD AUTO: 0.03 K/UL (ref 0–0.2)
BASOPHILS NFR BLD: 0.3 % (ref 0–1.9)
BILIRUB SERPL-MCNC: 0.7 MG/DL (ref 0.1–1)
BUN SERPL-MCNC: 20 MG/DL (ref 6–20)
CALCIUM SERPL-MCNC: 8.8 MG/DL (ref 8.7–10.5)
CHLORIDE SERPL-SCNC: 109 MMOL/L (ref 95–110)
CO2 SERPL-SCNC: 23 MMOL/L (ref 23–29)
CREAT SERPL-MCNC: 0.8 MG/DL (ref 0.5–1.4)
DIFFERENTIAL METHOD BLD: ABNORMAL
EOSINOPHIL # BLD AUTO: 0 K/UL (ref 0–0.5)
EOSINOPHIL NFR BLD: 0.2 % (ref 0–8)
ERYTHROCYTE [DISTWIDTH] IN BLOOD BY AUTOMATED COUNT: 12.8 % (ref 11.5–14.5)
EST. GFR  (NO RACE VARIABLE): >60 ML/MIN/1.73 M^2
GLUCOSE SERPL-MCNC: 107 MG/DL (ref 70–110)
HCT VFR BLD AUTO: 30.8 % (ref 37–48.5)
HGB BLD-MCNC: 10.5 G/DL (ref 12–16)
IMM GRANULOCYTES # BLD AUTO: 0.04 K/UL (ref 0–0.04)
IMM GRANULOCYTES NFR BLD AUTO: 0.4 % (ref 0–0.5)
LYMPHOCYTES # BLD AUTO: 1.9 K/UL (ref 1–4.8)
LYMPHOCYTES NFR BLD: 19.3 % (ref 18–48)
MCH RBC QN AUTO: 34.1 PG (ref 27–31)
MCHC RBC AUTO-ENTMCNC: 34.1 G/DL (ref 32–36)
MCV RBC AUTO: 100 FL (ref 82–98)
MONOCYTES # BLD AUTO: 0.7 K/UL (ref 0.3–1)
MONOCYTES NFR BLD: 7 % (ref 4–15)
NEUTROPHILS # BLD AUTO: 7.3 K/UL (ref 1.8–7.7)
NEUTROPHILS NFR BLD: 72.8 % (ref 38–73)
NRBC BLD-RTO: 0 /100 WBC
PLATELET # BLD AUTO: 178 K/UL (ref 150–450)
PMV BLD AUTO: 9.5 FL (ref 9.2–12.9)
POCT GLUCOSE: 101 MG/DL (ref 70–110)
POTASSIUM SERPL-SCNC: 4.2 MMOL/L (ref 3.5–5.1)
PROT SERPL-MCNC: 5.8 G/DL (ref 6–8.4)
RBC # BLD AUTO: 3.08 M/UL (ref 4–5.4)
SODIUM SERPL-SCNC: 144 MMOL/L (ref 136–145)
WBC # BLD AUTO: 10 K/UL (ref 3.9–12.7)

## 2024-08-09 PROCEDURE — 85025 COMPLETE CBC W/AUTO DIFF WBC: CPT | Performed by: INTERNAL MEDICINE

## 2024-08-09 PROCEDURE — 94761 N-INVAS EAR/PLS OXIMETRY MLT: CPT

## 2024-08-09 PROCEDURE — 36415 COLL VENOUS BLD VENIPUNCTURE: CPT | Performed by: INTERNAL MEDICINE

## 2024-08-09 PROCEDURE — 25000003 PHARM REV CODE 250: Performed by: INTERNAL MEDICINE

## 2024-08-09 PROCEDURE — 80053 COMPREHEN METABOLIC PANEL: CPT | Performed by: INTERNAL MEDICINE

## 2024-08-09 PROCEDURE — 63600175 PHARM REV CODE 636 W HCPCS: Performed by: INTERNAL MEDICINE

## 2024-08-09 RX ORDER — ISOSORBIDE MONONITRATE 60 MG/1
60 TABLET, EXTENDED RELEASE ORAL 2 TIMES DAILY
Qty: 60 TABLET | Refills: 11 | Status: SHIPPED | OUTPATIENT
Start: 2024-08-09 | End: 2025-08-09

## 2024-08-09 RX ADMIN — SODIUM CHLORIDE: 9 INJECTION, SOLUTION INTRAVENOUS at 08:08

## 2024-08-09 RX ADMIN — ISOSORBIDE MONONITRATE 60 MG: 60 TABLET, EXTENDED RELEASE ORAL at 08:08

## 2024-08-09 RX ADMIN — METOPROLOL SUCCINATE 25 MG: 25 TABLET, EXTENDED RELEASE ORAL at 08:08

## 2024-08-09 RX ADMIN — HYDRALAZINE HYDROCHLORIDE 10 MG: 20 INJECTION, SOLUTION INTRAMUSCULAR; INTRAVENOUS at 01:08

## 2024-08-09 RX ADMIN — SACUBITRIL AND VALSARTAN 1 TABLET: 24; 26 TABLET, FILM COATED ORAL at 08:08

## 2024-08-09 RX ADMIN — PRASUGREL 10 MG: 10 TABLET, FILM COATED ORAL at 08:08

## 2024-08-09 RX ADMIN — ASPIRIN 81 MG: 81 TABLET, COATED ORAL at 08:08

## 2024-08-09 NOTE — ASSESSMENT & PLAN NOTE
-s/p LHC PCI of prox-mid LCX  -Stable this AM, CP free  -Labs reviewed  -Continue ASA,Effient, BB, statin, Entresto  -Imdur increased to 60 mg BID  -Counseled on post-cath restrictions

## 2024-08-09 NOTE — DISCHARGE SUMMARY
[facility] - Telemetry ([facility])  Cardiology  Discharge Summary      Patient Name: Eloisa Camara  MRN: [id number]  Admission Date: 8/8/2024  Hospital Length of Stay: 0 days  Discharge Date and Time:  08/09/2024 11:24 AM  Attending Physician: Quintin Mccoy MD    Discharging Provider: Estela Lewis PA-C  Primary Care Physician: Stefany Fregoso NP    HPI:   Ms. Camara is a 54 year old female patient with known CAD who presented to [facility] on 8/8/24 for elective LCX intervention. Patient tolerated procedure well and was admitted overnight for observation.     Procedure(s) (LRB):  Left heart cath/possible lcx ptca/stent (Left)  PTCA, Single Vessel  Stent, Drug Eluting, Single Vessel, Coronary     Indwelling Lines/Drains at time of discharge:  Lines/Drains/Airways       None                   Hospital Course:  Patient seen and examined today and deemed suitable for discharge. No AEON. No CP/angina. Labs reviewed. Left radial access site with swelling/tenderness, U/S negative for pseudoaneurysm. Patient counseled on post-cath restrictions and medication changes. She will need to f/u in clinic in 1-2 weeks.     Goals of Care Treatment Preferences:  Code Status: Full Code    Significant Diagnostic Studies: Labs, [facility]    Pending Diagnostic Studies:       None            Final Active Diagnoses:    Diagnosis Date Noted POA    CAD, multiple vessel [I25.10] 05/30/2024 Yes    Smoker [F17.200] 01/10/2014 Yes      Problems Resolved During this Admission:     Cardiac/Vascular  CAD, multiple vessel  -s/p [facility] PCI of prox-mid LCX  -Stable this AM, CP free  -Labs reviewed  -Continue ASA,Effient, BB, statin, Entresto  -Imdur increased to 60 mg BID  -Counseled on post-cath restrictions      Other  Smoker  -Recently quit        Discharged Condition: good    Disposition: Home or Self Care    Follow Up:    Patient Instructions:   No discharge procedures on file.  Medications:  Reconciled Home Medications:      Medication List         CHANGE how you take these medications      isosorbide mononitrate 60 MG 24 hr tablet  Commonly known as: IMDUR  Take 1 tablet (60 mg total) by mouth 2 (two) times a day.  What changed:   medication strength  how much to take            CONTINUE taking these medications      aspirin 81 MG EC tablet  Commonly known as: ECOTRIN  Take 1 tablet (81 mg total) by mouth once daily.     atorvastatin 80 MG tablet  Commonly known as: LIPITOR  Take 1 tablet (80 mg total) by mouth every evening.     ENTRESTO 24-26 mg per tablet  Generic drug: sacubitriL-valsartan  Take 1 tablet by mouth 2 (two) times daily.     furosemide 40 MG tablet  Commonly known as: LASIX  Take 1 tablet (40 mg total) by mouth as needed (weight gain, swelling, shortness of breath).     metoprolol succinate 25 MG 24 hr tablet  Commonly known as: TOPROL-XL  Take 1 tablet (25 mg total) by mouth 2 (two) times daily.     nitroGLYCERIN 0.4 MG SL tablet  Commonly known as: NITROSTAT  Place 1 tablet (0.4 mg total) under the tongue every 5 (five) minutes as needed for Chest pain.     potassium chloride 10 MEQ Tbsr  Commonly known as: KLOR-CON  Take 1 tablet (10 mEq total) by mouth as needed (take on the days you take lasix).     prasugreL 10 mg Tab  Commonly known as: EFFIENT  Take 1 tablet (10 mg total) by mouth once daily.            STOP taking these medications      BANOPHEN 25 mg capsule  Generic drug: diphenhydrAMINE     diphenhydrAMINE 50 MG capsule  Commonly known as: BENADRYL     famotidine 40 MG tablet  Commonly known as: PEPCID     predniSONE 50 MG Tab  Commonly known as: DELTASONE              Time spent on the discharge of patient: 25 minutes    Estela Lewis PA-C  Cardiology  [facility] - Telemetry ([facility])

## 2024-08-09 NOTE — PLAN OF CARE
[facility] - Telemetry (Hospital)  Discharge Assessment    Primary Care Provider: Stefany Fregoso NP     Discharge Assessment (most recent)       BRIEF DISCHARGE ASSESSMENT - 08/09/24 1126          Discharge Planning    Assessment Type Discharge Planning Brief Assessment (P)      Resource/Environmental Concerns none (P)      Support Systems Children (P)      Assistance Needed none (P)      Equipment Currently Used at Home none (P)      Current Living Arrangements home (P)      Patient/Family Anticipates Transition to home with family (P)      Patient/Family Anticipated Services at Transition none (P)      DME Needed Upon Discharge  none (P)      Discharge Plan A Home with family (P)                    Lives with 2 daughters age 17 and 20 who can be her help at home.  Currently no discharge needs.

## 2024-08-15 ENCOUNTER — OFFICE VISIT (OUTPATIENT)
Dept: CARDIOLOGY | Facility: CLINIC | Age: 54
End: 2024-08-15
Payer: MEDICAID

## 2024-08-15 VITALS
BODY MASS INDEX: 22.47 KG/M2 | WEIGHT: 131.63 LBS | HEIGHT: 64 IN | DIASTOLIC BLOOD PRESSURE: 87 MMHG | OXYGEN SATURATION: 98 % | HEART RATE: 85 BPM | SYSTOLIC BLOOD PRESSURE: 134 MMHG

## 2024-08-15 DIAGNOSIS — I20.89 STABLE ANGINA PECTORIS: ICD-10-CM

## 2024-08-15 DIAGNOSIS — Z95.5 S/P CORONARY ARTERY STENT PLACEMENT: Primary | ICD-10-CM

## 2024-08-15 DIAGNOSIS — Z72.0 TOBACCO USE: ICD-10-CM

## 2024-08-15 DIAGNOSIS — I25.5 ISCHEMIC CARDIOMYOPATHY: ICD-10-CM

## 2024-08-15 DIAGNOSIS — I25.2 HISTORY OF NON-ST ELEVATION MYOCARDIAL INFARCTION (NSTEMI): ICD-10-CM

## 2024-08-15 DIAGNOSIS — R79.89 ELEVATED TROPONIN: ICD-10-CM

## 2024-08-15 PROCEDURE — 99213 OFFICE O/P EST LOW 20 MIN: CPT | Mod: PBBFAC | Performed by: PHYSICIAN ASSISTANT

## 2024-08-15 PROCEDURE — 99999 PR PBB SHADOW E&M-EST. PATIENT-LVL III: CPT | Mod: PBBFAC,,, | Performed by: PHYSICIAN ASSISTANT

## 2024-08-15 PROCEDURE — 3079F DIAST BP 80-89 MM HG: CPT | Mod: CPTII,,, | Performed by: PHYSICIAN ASSISTANT

## 2024-08-15 PROCEDURE — 3075F SYST BP GE 130 - 139MM HG: CPT | Mod: CPTII,,, | Performed by: PHYSICIAN ASSISTANT

## 2024-08-15 PROCEDURE — 3008F BODY MASS INDEX DOCD: CPT | Mod: CPTII,,, | Performed by: PHYSICIAN ASSISTANT

## 2024-08-15 PROCEDURE — 4010F ACE/ARB THERAPY RXD/TAKEN: CPT | Mod: CPTII,,, | Performed by: PHYSICIAN ASSISTANT

## 2024-08-15 PROCEDURE — 1159F MED LIST DOCD IN RCRD: CPT | Mod: CPTII,,, | Performed by: PHYSICIAN ASSISTANT

## 2024-08-15 PROCEDURE — 99214 OFFICE O/P EST MOD 30 MIN: CPT | Mod: S$PBB,,, | Performed by: PHYSICIAN ASSISTANT

## 2024-08-15 NOTE — PROGRESS NOTES
Subjective   Patient ID:  Eloisa Camara is a 54 y.o. female who presents for follow-up of  hospital follow-up    HPI  Ms. Camara is a 54 year old female whose current medical conditions include CAD s/p recent NSTEMI with PCI of LAD (70% LCX stenosis also noted), ICM, HTN, hyperlipidemia, and tobacco abuse who presents today for hospital follow-up. Patient recently underwent LHC by Dr. Mccoy on 8/8/24 with subsequent successful of LCX stenosis. She returns today and states she is doing ok overall. She had an episode of hypotension yesterday where her BP was 95/67 and felt very weak. BP rebounded and is ok today in office. Denies any CP, heaviness, or tightness. No SOB/CARSON. No LH, dizziness, palpitations, near syncope, or syncope. No s/s suggestive of CHF.  Does admit to some radial access bruising/soreness, gradually improving. She reports compliance with her medications. No longer smoking.     TTE 7/10/24 with improved EF to 50%, +WMA.   U/S from admission showed no pseudoaneurysm/hematoma.       Review of Systems   Constitutional: Negative for chills, decreased appetite, fever and malaise/fatigue.   HENT:  Negative for congestion, hoarse voice and sore throat.    Eyes:  Negative for blurred vision and discharge.   Cardiovascular:  Negative for chest pain, claudication, cyanosis, dyspnea on exertion, irregular heartbeat, leg swelling, near-syncope, orthopnea, palpitations and paroxysmal nocturnal dyspnea.   Respiratory:  Negative for cough, hemoptysis, shortness of breath, snoring, sputum production and wheezing.    Endocrine: Negative for cold intolerance and heat intolerance.   Hematologic/Lymphatic: Negative for bleeding problem. Bruises/bleeds easily.   Skin:  Negative for rash.   Musculoskeletal:  Negative for arthritis, back pain, joint pain, joint swelling, muscle cramps, muscle weakness and myalgias.   Gastrointestinal:  Negative for abdominal pain, constipation, diarrhea, heartburn, melena and nausea.  "  Genitourinary:  Negative for hematuria.   Neurological:  Negative for dizziness, focal weakness, headaches, light-headedness, loss of balance, numbness, paresthesias, seizures and weakness.   Psychiatric/Behavioral:  Negative for memory loss. The patient does not have insomnia.    Allergic/Immunologic: Negative for hives.     /87 (BP Location: Left arm, Patient Position: Sitting, BP Method: Medium (Automatic))   Pulse 85   Ht 5' 4" (1.626 m)   Wt 59.7 kg (131 lb 9.8 oz)   LMP 01/26/2014   SpO2 (!) 81%   BMI 22.59 kg/m²        Objective     Physical Exam  Vitals and nursing note reviewed.   Constitutional:       General: She is not in acute distress.     Appearance: Normal appearance. She is well-developed. She is not diaphoretic.   HENT:      Head: Normocephalic and atraumatic.   Eyes:      General:         Right eye: No discharge.         Left eye: No discharge.      Pupils: Pupils are equal, round, and reactive to light.   Cardiovascular:      Rate and Rhythm: Normal rate and regular rhythm.      Heart sounds: Normal heart sounds, S1 normal and S2 normal. No murmur heard.  Pulmonary:      Effort: Pulmonary effort is normal. No respiratory distress.      Breath sounds: Normal breath sounds.   Abdominal:      General: There is no distension.   Skin:     General: Skin is warm and dry.      Findings: No erythema.      Comments: L radial access site C/D/I; bruising noted, no drainage/erythema   Neurological:      Mental Status: She is alert and oriented to person, place, and time.   Psychiatric:         Mood and Affect: Mood normal.         Behavior: Behavior normal.         Thought Content: Thought content normal.       TTE Results  Summary  Show Result Comparison     Left Ventricle: The left ventricle is normal in size. Normal wall thickness. There is concentric hypertrophy. Normal wall motion. See diagram for wall motion findings. There is normal diastolic function.    Right Ventricle: Normal right " ventricular cavity size. Wall thickness is normal. Systolic function is normal.    Left Atrium: Normal left atrial size.    Pulmonary Artery: The estimated pulmonary artery systolic pressure is 21 mmHg.    IVC/SVC: Normal venous pressure at 3 mmHg.            Assessment and Plan     1. S/P coronary artery stent placement    2. Stable angina pectoris    3. History of non-ST elevation myocardial infarction (NSTEMI)    4. Elevated troponin    5. Ischemic cardiomyopathy    6. Tobacco use      Doing clinically well. She had an episode of hypotension, BP has rebounded. No angina. No s/s suggestive of CHF. Continue same mgmt for now. Monitor BP at home.   Plan:  -Continue current medical management and risk factor modification  -Cardiac, low salt diet  -Continue active lifestyle  -Monitor BP at home; message clinic if issues  -Counseled on DAPT importance  -RTC 3 months with Dr. Mccoy with lipid, cmp

## 2024-11-12 ENCOUNTER — LAB VISIT (OUTPATIENT)
Dept: LAB | Facility: HOSPITAL | Age: 54
End: 2024-11-12
Attending: PHYSICIAN ASSISTANT
Payer: MEDICAID

## 2024-11-12 DIAGNOSIS — Z95.5 S/P CORONARY ARTERY STENT PLACEMENT: ICD-10-CM

## 2024-11-12 LAB
ALBUMIN SERPL BCP-MCNC: 4.7 G/DL (ref 3.5–5.2)
ALP SERPL-CCNC: 56 U/L (ref 40–150)
ALT SERPL W/O P-5'-P-CCNC: 31 U/L (ref 10–44)
ANION GAP SERPL CALC-SCNC: 11 MMOL/L (ref 8–16)
AST SERPL-CCNC: 23 U/L (ref 10–40)
BILIRUB SERPL-MCNC: 0.9 MG/DL (ref 0.1–1)
BUN SERPL-MCNC: 16 MG/DL (ref 6–20)
CALCIUM SERPL-MCNC: 9.8 MG/DL (ref 8.7–10.5)
CHLORIDE SERPL-SCNC: 102 MMOL/L (ref 95–110)
CHOLEST SERPL-MCNC: 180 MG/DL (ref 120–199)
CHOLEST/HDLC SERPL: 3.1 {RATIO} (ref 2–5)
CO2 SERPL-SCNC: 27 MMOL/L (ref 23–29)
CREAT SERPL-MCNC: 0.9 MG/DL (ref 0.5–1.4)
EST. GFR  (NO RACE VARIABLE): >60 ML/MIN/1.73 M^2
GLUCOSE SERPL-MCNC: 85 MG/DL (ref 70–110)
HDLC SERPL-MCNC: 58 MG/DL (ref 40–75)
HDLC SERPL: 32.2 % (ref 20–50)
LDLC SERPL CALC-MCNC: 83.6 MG/DL (ref 63–159)
NONHDLC SERPL-MCNC: 122 MG/DL
POTASSIUM SERPL-SCNC: 4.7 MMOL/L (ref 3.5–5.1)
PROT SERPL-MCNC: 7.9 G/DL (ref 6–8.4)
SODIUM SERPL-SCNC: 140 MMOL/L (ref 136–145)
TRIGL SERPL-MCNC: 192 MG/DL (ref 30–150)

## 2024-11-12 PROCEDURE — 80061 LIPID PANEL: CPT | Performed by: PHYSICIAN ASSISTANT

## 2024-11-12 PROCEDURE — 80053 COMPREHEN METABOLIC PANEL: CPT | Performed by: PHYSICIAN ASSISTANT

## 2024-11-12 PROCEDURE — 36415 COLL VENOUS BLD VENIPUNCTURE: CPT | Performed by: PHYSICIAN ASSISTANT

## 2024-11-13 ENCOUNTER — TELEPHONE (OUTPATIENT)
Dept: CARDIOLOGY | Facility: CLINIC | Age: 54
End: 2024-11-13
Payer: MEDICAID

## 2024-11-13 NOTE — TELEPHONE ENCOUNTER
----- Message from Estela Lewis PA-C sent at 11/13/2024 12:23 PM CST -----  Creatinine/electrolytes stable. Triglycerides/LDL above goal.     Dr. Mccoy to discuss at f/u.

## 2024-11-19 ENCOUNTER — HOSPITAL ENCOUNTER (OUTPATIENT)
Dept: RADIOLOGY | Facility: HOSPITAL | Age: 54
Discharge: HOME | End: 2024-11-19
Attending: INTERNAL MEDICINE
Payer: MEDICAID

## 2024-11-19 ENCOUNTER — OFFICE VISIT (OUTPATIENT)
Dept: CARDIOLOGY | Facility: CLINIC | Age: 54
End: 2024-11-19
Payer: MEDICAID

## 2024-11-19 VITALS
SYSTOLIC BLOOD PRESSURE: 138 MMHG | DIASTOLIC BLOOD PRESSURE: 84 MMHG | BODY MASS INDEX: 24.6 KG/M2 | WEIGHT: 143.31 LBS | OXYGEN SATURATION: 97 % | HEART RATE: 80 BPM

## 2024-11-19 DIAGNOSIS — I25.10 CAD, MULTIPLE VESSEL: Primary | ICD-10-CM

## 2024-11-19 DIAGNOSIS — R94.31 ABNORMAL ECG: ICD-10-CM

## 2024-11-19 DIAGNOSIS — I20.89 STABLE ANGINA PECTORIS: ICD-10-CM

## 2024-11-19 DIAGNOSIS — M54.2 NECK PAIN: ICD-10-CM

## 2024-11-19 DIAGNOSIS — Z95.5 S/P CORONARY ARTERY STENT PLACEMENT: ICD-10-CM

## 2024-11-19 DIAGNOSIS — I25.5 ISCHEMIC CARDIOMYOPATHY: ICD-10-CM

## 2024-11-19 DIAGNOSIS — E78.2 MIXED HYPERLIPIDEMIA: ICD-10-CM

## 2024-11-19 DIAGNOSIS — I25.2 HISTORY OF NON-ST ELEVATION MYOCARDIAL INFARCTION (NSTEMI): ICD-10-CM

## 2024-11-19 DIAGNOSIS — I10 ESSENTIAL HYPERTENSION: ICD-10-CM

## 2024-11-19 DIAGNOSIS — R00.2 PALPITATIONS: ICD-10-CM

## 2024-11-19 DIAGNOSIS — I25.118 CORONARY ARTERY DISEASE OF NATIVE ARTERY OF NATIVE HEART WITH STABLE ANGINA PECTORIS: ICD-10-CM

## 2024-11-19 DIAGNOSIS — Z72.0 TOBACCO USE: ICD-10-CM

## 2024-11-19 PROCEDURE — 3044F HG A1C LEVEL LT 7.0%: CPT | Mod: CPTII,,, | Performed by: INTERNAL MEDICINE

## 2024-11-19 PROCEDURE — 1159F MED LIST DOCD IN RCRD: CPT | Mod: CPTII,,, | Performed by: INTERNAL MEDICINE

## 2024-11-19 PROCEDURE — 3008F BODY MASS INDEX DOCD: CPT | Mod: CPTII,,, | Performed by: INTERNAL MEDICINE

## 2024-11-19 PROCEDURE — 99213 OFFICE O/P EST LOW 20 MIN: CPT | Mod: PBBFAC | Performed by: INTERNAL MEDICINE

## 2024-11-19 PROCEDURE — 72040 X-RAY EXAM NECK SPINE 2-3 VW: CPT | Mod: 26,,, | Performed by: RADIOLOGY

## 2024-11-19 PROCEDURE — 72040 X-RAY EXAM NECK SPINE 2-3 VW: CPT | Mod: TC

## 2024-11-19 PROCEDURE — 3079F DIAST BP 80-89 MM HG: CPT | Mod: CPTII,,, | Performed by: INTERNAL MEDICINE

## 2024-11-19 PROCEDURE — 3075F SYST BP GE 130 - 139MM HG: CPT | Mod: CPTII,,, | Performed by: INTERNAL MEDICINE

## 2024-11-19 PROCEDURE — 4010F ACE/ARB THERAPY RXD/TAKEN: CPT | Mod: CPTII,,, | Performed by: INTERNAL MEDICINE

## 2024-11-19 PROCEDURE — 1160F RVW MEDS BY RX/DR IN RCRD: CPT | Mod: CPTII,,, | Performed by: INTERNAL MEDICINE

## 2024-11-19 PROCEDURE — 99999 PR PBB SHADOW E&M-EST. PATIENT-LVL III: CPT | Mod: PBBFAC,,, | Performed by: INTERNAL MEDICINE

## 2024-11-19 PROCEDURE — 99214 OFFICE O/P EST MOD 30 MIN: CPT | Mod: S$PBB,,, | Performed by: INTERNAL MEDICINE

## 2024-11-19 RX ORDER — ROSUVASTATIN CALCIUM 40 MG/1
40 TABLET, COATED ORAL NIGHTLY
Qty: 90 TABLET | Refills: 3 | Status: SHIPPED | OUTPATIENT
Start: 2024-11-19 | End: 2025-11-19

## 2024-11-19 RX ORDER — EZETIMIBE 10 MG/1
10 TABLET ORAL DAILY
Qty: 90 TABLET | Refills: 3 | Status: SHIPPED | OUTPATIENT
Start: 2024-11-19 | End: 2025-11-19

## 2024-11-19 NOTE — PROGRESS NOTES
Subjective   Patient ID:  Eloisa Camara is a 54 y.o. female who presents for evaluation of Coronary Artery Disease      HPI  Pt presents for eval.  Current med conditions include multivessel CAD, HTN, hyperlipidemia, smoking.  Pt admitted May 2024 w NSTEMI, CHF EF 30%.  S/p LHC/RHC May 2024 with PCI severe calcified mid LAD w abrupt closure tx w JAYLEEN x 2 overlapped ([facility]).  RHC:RA 6 RV 29/7 (10) PA 29/18 (23) PCWP 11 CO 3.5 l/m  Pt had staged PCI in Aug 2024 of 80% tortuous calcified proximal-mid LCX stenosis with 2.75 x 18 mm Orsiro JAYLEEN x one.  Had ostial LCX stenosis w med mgt advised -- see cath report.  EF recovered to 50% on 7/24 echo.  Stable CV status.  No active angina.  No CHF issues.  Fatigue.  No syncope.  Rarely smokes.  Compliant w meds.  Lipids stable.  Good on diet.  Has some neck/left shoulder msk type pain since summer with her heart issues.  BP controlled on home checks.          Past Medical History:   Diagnosis Date    Abnormal ECG 11/19/2024    Abnormal Pap smear     Cryo 1992, history of warts, normal pap since    CAD, multiple vessel 05/30/2024    Essential hypertension 11/19/2024    History of non-ST elevation myocardial infarction (NSTEMI) 05/29/2024    Hypertension     Mixed hyperlipidemia 11/19/2024    S/P coronary artery stent placement 07/17/2024    Tobacco use 06/07/2024       Current Outpatient Medications:     aspirin (ECOTRIN) 81 MG EC tablet, Take 1 tablet (81 mg total) by mouth once daily., Disp: 90 tablet, Rfl: 3    betamethasone valerate (LUXIQ) 0.12 % foam, Apply twice daily until cleared, Disp: 100 g, Rfl: 11    ergocalciferol (ERGOCALCIFEROL) 50,000 unit Cap, Take 1 capsule by mouth once weekly., Disp: 12 capsule, Rfl: 3    ezetimibe (ZETIA) 10 mg tablet, Take 1 tablet (10 mg total) by mouth once daily., Disp: 90 tablet, Rfl: 3    furosemide (LASIX) 40 MG tablet, Take 1 tablet (40 mg total) by mouth as needed (weight gain, swelling, shortness of breath)., Disp: 15 tablet,  Rfl: 0    isosorbide mononitrate (IMDUR) 60 MG 24 hr tablet, Take 1 tablet (60 mg total) by mouth 2 (two) times a day., Disp: 60 tablet, Rfl: 11    metoprolol succinate (TOPROL-XL) 25 MG 24 hr tablet, Take 1 tablet (25 mg total) by mouth 2 (two) times daily., Disp: 180 tablet, Rfl: 3    nitroGLYCERIN (NITROSTAT) 0.4 MG SL tablet, Place 1 tablet (0.4 mg total) under the tongue every 5 (five) minutes as needed for Chest pain., Disp: 100 tablet, Rfl: 3    potassium chloride (KLOR-CON) 10 MEQ TbSR, Take 1 tablet (10 mEq total) by mouth as needed (take on the days you take lasix)., Disp: 30 tablet, Rfl: 0    prasugreL (EFFIENT) 10 mg Tab, Take 1 tablet (10 mg total) by mouth once daily., Disp: 90 tablet, Rfl: 3    rosuvastatin (CRESTOR) 40 MG Tab, Take 1 tablet (40 mg total) by mouth every evening., Disp: 90 tablet, Rfl: 3    sacubitriL-valsartan (ENTRESTO) 24-26 mg per tablet, Take 1 tablet by mouth 2 (two) times daily., Disp: 180 tablet, Rfl: 3      Review of Systems   Constitutional: Positive for malaise/fatigue.   HENT: Negative.     Eyes: Negative.    Cardiovascular: Negative.    Respiratory: Negative.     Endocrine: Negative.    Hematologic/Lymphatic: Negative.    Skin: Negative.    Musculoskeletal:  Positive for arthritis, joint pain, neck pain and stiffness.   Gastrointestinal: Negative.    Genitourinary: Negative.    Neurological: Negative.    Psychiatric/Behavioral: Negative.     Allergic/Immunologic: Negative.        /84 (BP Location: Left arm, Patient Position: Sitting)   Pulse 80   Wt 65 kg (143 lb 4.8 oz)   LMP 01/26/2014   SpO2 97%   BMI 24.60 kg/m²     Wt Readings from Last 3 Encounters:   11/19/24 65 kg (143 lb 4.8 oz)   08/15/24 59.7 kg (131 lb 9.8 oz)   07/17/24 59.6 kg (131 lb 6.3 oz)     Temp Readings from Last 3 Encounters:   08/09/24 97.6 °F (36.4 °C) (Oral)   05/31/24 98.8 °F (37.1 °C) (Oral)   08/19/22 97.9 °F (36.6 °C) (Oral)     BP Readings from Last 3 Encounters:   11/19/24 138/84    08/15/24 134/87   08/09/24 (!) 180/92     Pulse Readings from Last 3 Encounters:   11/19/24 80   08/15/24 85   08/09/24 86          Objective     Physical Exam  Vitals and nursing note reviewed.   Constitutional:       General: She is not in acute distress.     Appearance: Normal appearance. She is well-developed. She is not ill-appearing or diaphoretic.   HENT:      Head: Normocephalic.   Neck:      Thyroid: No thyromegaly.      Vascular: Normal carotid pulses. No carotid bruit, hepatojugular reflux or JVD.   Cardiovascular:      Rate and Rhythm: Normal rate and regular rhythm.      Chest Wall: PMI is not displaced.      Pulses: Normal pulses.           Radial pulses are 2+ on the right side and 2+ on the left side.      Heart sounds: Normal heart sounds, S1 normal and S2 normal. No murmur heard.     No friction rub. No gallop.   Pulmonary:      Effort: Pulmonary effort is normal.      Breath sounds: Normal breath sounds. No wheezing or rales.   Abdominal:      General: Bowel sounds are normal. There is no abdominal bruit.      Palpations: Abdomen is soft. There is no hepatomegaly, splenomegaly or mass.      Tenderness: There is no abdominal tenderness.   Musculoskeletal:      Cervical back: Neck supple.   Lymphadenopathy:      Cervical: No cervical adenopathy.   Skin:     General: Skin is warm.   Neurological:      Mental Status: She is alert and oriented to person, place, and time.   Psychiatric:         Behavior: Behavior normal. Behavior is cooperative.       I have reviewed all pertinent labs and cardiac studies.        Chemistry        Component Value Date/Time     11/12/2024 0810    K 4.7 11/12/2024 0810     11/12/2024 0810    CO2 27 11/12/2024 0810    BUN 16 11/12/2024 0810    CREATININE 0.9 11/12/2024 0810    GLU 85 11/12/2024 0810        Component Value Date/Time    CALCIUM 9.8 11/12/2024 0810    ALKPHOS 56 11/12/2024 0810    AST 23 11/12/2024 0810    ALT 31 11/12/2024 0810    BILITOT 0.9  11/12/2024 0810    ESTGFRAFRICA >60.0 02/13/2014 1200    EGFRNONAA >60.0 02/13/2014 1200            Lab Results   Component Value Date    WBC 10.00 08/09/2024    HGB 10.5 (L) 08/09/2024    HCT 30.8 (L) 08/09/2024     (H) 08/09/2024     08/09/2024       Lab Results   Component Value Date    HGBA1C 5.6 09/05/2024       Lab Results   Component Value Date    CHOL 180 11/12/2024    CHOL 166 07/10/2024     Lab Results   Component Value Date    HDL 58 11/12/2024    HDL 43 07/10/2024     Lab Results   Component Value Date    LDLCALC 83.6 11/12/2024    LDLCALC 83.8 07/10/2024     Lab Results   Component Value Date    TRIG 192 (H) 11/12/2024    TRIG 196 (H) 07/10/2024       Lab Results   Component Value Date    CHOLHDL 32.2 11/12/2024    CHOLHDL 25.9 07/10/2024       Results for orders placed during the hospital encounter of 07/10/24    Echo    Interpretation Summary    Left Ventricle: The left ventricle is normal in size. Normal wall thickness. There is concentric hypertrophy. Normal wall motion. See diagram for wall motion findings. There is normal diastolic function.    Right Ventricle: Normal right ventricular cavity size. Wall thickness is normal. Systolic function is normal.    Left Atrium: Normal left atrial size.    Pulmonary Artery: The estimated pulmonary artery systolic pressure is 21 mmHg.    IVC/SVC: Normal venous pressure at 3 mmHg.               Assessment and Plan     1. CAD, multiple vessel    2. S/P coronary artery stent placement    3. Stable angina pectoris    4. Palpitations    5. History of non-ST elevation myocardial infarction (NSTEMI)    6. Ischemic cardiomyopathy    7. Abnormal ECG    8. Mixed hyperlipidemia    9. Essential hypertension    10. Coronary artery disease of native artery of native heart with stable angina pectoris    11. Neck pain    12. Tobacco use        Plan:      Stable CV status on current med tx.  CAD: S/p PCI LAD for NSTEMI May 2024 and staged PCI LCX Aug  2024.  Continue DAPT and OMT for CAD.  Sl ntg prn for breakthrough angina.  HTN: Goal < 130/80.  Continue current HTN meds.  Home BP monitoring.  Ischemic cardiomyopathy: continue current med tx and monitor with f/u echo in future.  Lipids: Start Rosuvastatin 40 mg qhs and Zetia 10 mg qd and recheck lipids 3 months.  Stop Atorvastatin.  Tobacco abuse: smoking cessation.  Abnl ecg: monitor.  Palpitations: monitor.  Neck pain: C spine xray ordered.  Cardiac diet.  Daily exercise.    Phone review.    F/u 3 months w lipids.          I have reviewed all pertinent labs and cardiac studies independently. Plans and recommendations have been formulated under my direct supervision. All questions answered and patient voiced understanding.

## 2025-02-10 ENCOUNTER — LAB VISIT (OUTPATIENT)
Dept: LAB | Facility: HOSPITAL | Age: 55
End: 2025-02-10
Attending: INTERNAL MEDICINE
Payer: MEDICAID

## 2025-02-10 DIAGNOSIS — E78.2 MIXED HYPERLIPIDEMIA: ICD-10-CM

## 2025-02-10 LAB
ALBUMIN SERPL BCP-MCNC: 4.3 G/DL (ref 3.5–5.2)
ALP SERPL-CCNC: 58 U/L (ref 40–150)
ALT SERPL W/O P-5'-P-CCNC: 194 U/L (ref 10–44)
ANION GAP SERPL CALC-SCNC: 8 MMOL/L (ref 8–16)
AST SERPL-CCNC: 145 U/L (ref 10–40)
BILIRUB SERPL-MCNC: 1 MG/DL (ref 0.1–1)
BUN SERPL-MCNC: 14 MG/DL (ref 6–20)
CALCIUM SERPL-MCNC: 9.3 MG/DL (ref 8.7–10.5)
CHLORIDE SERPL-SCNC: 105 MMOL/L (ref 95–110)
CHOLEST SERPL-MCNC: 124 MG/DL (ref 120–199)
CHOLEST/HDLC SERPL: 2 {RATIO} (ref 2–5)
CO2 SERPL-SCNC: 26 MMOL/L (ref 23–29)
CREAT SERPL-MCNC: 0.9 MG/DL (ref 0.5–1.4)
EST. GFR  (NO RACE VARIABLE): >60 ML/MIN/1.73 M^2
GLUCOSE SERPL-MCNC: 91 MG/DL (ref 70–110)
HDLC SERPL-MCNC: 63 MG/DL (ref 40–75)
HDLC SERPL: 50.8 % (ref 20–50)
LDLC SERPL CALC-MCNC: 35.6 MG/DL (ref 63–159)
NONHDLC SERPL-MCNC: 61 MG/DL
POTASSIUM SERPL-SCNC: 4.4 MMOL/L (ref 3.5–5.1)
PROT SERPL-MCNC: 7.6 G/DL (ref 6–8.4)
SODIUM SERPL-SCNC: 139 MMOL/L (ref 136–145)
TRIGL SERPL-MCNC: 127 MG/DL (ref 30–150)

## 2025-02-10 PROCEDURE — 80061 LIPID PANEL: CPT | Performed by: INTERNAL MEDICINE

## 2025-02-10 PROCEDURE — 80053 COMPREHEN METABOLIC PANEL: CPT | Performed by: INTERNAL MEDICINE

## 2025-02-10 PROCEDURE — 36415 COLL VENOUS BLD VENIPUNCTURE: CPT | Performed by: INTERNAL MEDICINE

## 2025-02-17 ENCOUNTER — OFFICE VISIT (OUTPATIENT)
Dept: CARDIOLOGY | Facility: CLINIC | Age: 55
End: 2025-02-17
Payer: MEDICAID

## 2025-02-17 VITALS
BODY MASS INDEX: 26.54 KG/M2 | WEIGHT: 155.44 LBS | HEIGHT: 64 IN | DIASTOLIC BLOOD PRESSURE: 96 MMHG | HEART RATE: 77 BPM | SYSTOLIC BLOOD PRESSURE: 164 MMHG | OXYGEN SATURATION: 100 %

## 2025-02-17 DIAGNOSIS — R94.31 ABNORMAL ECG: ICD-10-CM

## 2025-02-17 DIAGNOSIS — Z72.0 TOBACCO USE: ICD-10-CM

## 2025-02-17 DIAGNOSIS — I10 ESSENTIAL HYPERTENSION: ICD-10-CM

## 2025-02-17 DIAGNOSIS — I20.89 STABLE ANGINA PECTORIS: ICD-10-CM

## 2025-02-17 DIAGNOSIS — F17.200 SMOKER: ICD-10-CM

## 2025-02-17 DIAGNOSIS — Z95.5 S/P CORONARY ARTERY STENT PLACEMENT: ICD-10-CM

## 2025-02-17 DIAGNOSIS — I25.5 ISCHEMIC CARDIOMYOPATHY: ICD-10-CM

## 2025-02-17 DIAGNOSIS — E78.2 MIXED HYPERLIPIDEMIA: ICD-10-CM

## 2025-02-17 DIAGNOSIS — R00.2 PALPITATIONS: ICD-10-CM

## 2025-02-17 DIAGNOSIS — I25.118 CORONARY ARTERY DISEASE OF NATIVE ARTERY OF NATIVE HEART WITH STABLE ANGINA PECTORIS: Primary | ICD-10-CM

## 2025-02-17 DIAGNOSIS — R79.89 ELEVATED BRAIN NATRIURETIC PEPTIDE (BNP) LEVEL: ICD-10-CM

## 2025-02-17 DIAGNOSIS — I25.2 HISTORY OF NON-ST ELEVATION MYOCARDIAL INFARCTION (NSTEMI): ICD-10-CM

## 2025-02-17 DIAGNOSIS — R74.8 ABNORMAL LIVER ENZYMES: ICD-10-CM

## 2025-02-17 DIAGNOSIS — I25.10 CAD, MULTIPLE VESSEL: ICD-10-CM

## 2025-02-17 PROCEDURE — 99213 OFFICE O/P EST LOW 20 MIN: CPT | Mod: PBBFAC | Performed by: INTERNAL MEDICINE

## 2025-02-17 RX ORDER — ROSUVASTATIN CALCIUM 10 MG/1
10 TABLET, COATED ORAL NIGHTLY
Qty: 90 TABLET | Refills: 3 | Status: SHIPPED | OUTPATIENT
Start: 2025-02-17 | End: 2026-02-17

## 2025-02-17 RX ORDER — SACUBITRIL AND VALSARTAN 49; 51 MG/1; MG/1
1 TABLET, FILM COATED ORAL 2 TIMES DAILY
Qty: 180 TABLET | Refills: 3 | Status: SHIPPED | OUTPATIENT
Start: 2025-02-17

## 2025-02-17 NOTE — PROGRESS NOTES
Subjective   Patient ID:  Eloisa Camara is a 54 y.o. female who presents for evaluation of Hyperlipidemia and Coronary Artery Disease      HPI  Pt presents for eval.  Current med conditions include multivessel CAD, HTN, hyperlipidemia, smoking.  Pt admitted May 2024 w NSTEMI, CHF EF 30%.  S/p LHC/RHC May 2024 with PCI severe calcified mid LAD w abrupt closure tx w JAYLEEN x 2 overlapped ([facility]).  RHC:RA 6 RV 29/7 (10) PA 29/18 (23) PCWP 11 CO 3.5 l/m  Pt had staged PCI in Aug 2024 of 80% tortuous calcified proximal-mid LCX stenosis with 2.75 x 18 mm Orsiro JAYLEEN x one.  Had ostial LCX stenosis w med mgt advised -- see cath report.  EF recovered to 50% on 7/24 echo.  Now here for 3 month f/u appt.  LFTs elevated.  States statin caused weight gain, aches.  Angina controlled.  No acute CP/angina.  No sl ntg use.  No CHF sxs.  BP is above goal.  Not smoking.  Lipids are excellent on statin/Zetia.        Past Medical History:   Diagnosis Date    Abnormal ECG 11/19/2024    Abnormal Pap smear     Cryo 1992, history of warts, normal pap since    CAD, multiple vessel 05/30/2024    Essential hypertension 11/19/2024    History of non-ST elevation myocardial infarction (NSTEMI) 05/29/2024    Hypertension     Mixed hyperlipidemia 11/19/2024    S/P coronary artery stent placement 07/17/2024    Tobacco use 06/07/2024       Current Outpatient Medications:     aspirin (ECOTRIN) 81 MG EC tablet, Take 1 tablet (81 mg total) by mouth once daily., Disp: 90 tablet, Rfl: 3    betamethasone valerate (LUXIQ) 0.12 % foam, Apply to affected area twice daily until cleared, Disp: 100 g, Rfl: 11    ergocalciferol (ERGOCALCIFEROL) 50,000 unit Cap, Take 1 capsule by mouth once weekly., Disp: 12 capsule, Rfl: 3    ezetimibe (ZETIA) 10 mg tablet, Take 1 tablet (10 mg total) by mouth once daily., Disp: 90 tablet, Rfl: 3    furosemide (LASIX) 40 MG tablet, Take 1 tablet (40 mg total) by mouth as needed (weight gain, swelling, shortness of breath).,  "Disp: 15 tablet, Rfl: 0    isosorbide mononitrate (IMDUR) 60 MG 24 hr tablet, Take 1 tablet (60 mg total) by mouth 2 (two) times a day., Disp: 60 tablet, Rfl: 11    metoprolol succinate (TOPROL-XL) 25 MG 24 hr tablet, Take 1 tablet (25 mg total) by mouth 2 (two) times daily., Disp: 180 tablet, Rfl: 3    nitroGLYCERIN (NITROSTAT) 0.4 MG SL tablet, Place 1 tablet (0.4 mg total) under the tongue every 5 (five) minutes as needed for Chest pain., Disp: 100 tablet, Rfl: 3    potassium chloride (KLOR-CON) 10 MEQ TbSR, Take 1 tablet (10 mEq total) by mouth as needed (take on the days you take lasix)., Disp: 30 tablet, Rfl: 0    prasugreL (EFFIENT) 10 mg Tab, Take 1 tablet (10 mg total) by mouth once daily., Disp: 90 tablet, Rfl: 3    rosuvastatin (CRESTOR) 40 MG Tab, Take 1 tablet (40 mg total) by mouth every evening., Disp: 90 tablet, Rfl: 3    sacubitriL-valsartan (ENTRESTO) 24-26 mg per tablet, Take 1 tablet by mouth 2 (two) times daily., Disp: 180 tablet, Rfl: 3      Review of Systems   Constitutional: Positive for malaise/fatigue and weight gain.   HENT: Negative.     Eyes: Negative.    Cardiovascular:  Positive for chest pain.   Respiratory: Negative.     Endocrine: Negative.    Hematologic/Lymphatic: Negative.    Skin: Negative.    Musculoskeletal:  Positive for arthritis, joint pain, muscle cramps, myalgias, neck pain and stiffness.   Gastrointestinal: Negative.    Genitourinary: Negative.    Neurological: Negative.    Psychiatric/Behavioral: Negative.     Allergic/Immunologic: Negative.        BP (!) 164/96 (BP Location: Left arm)   Pulse 77   Ht 5' 3.5" (1.613 m)   Wt 70.5 kg (155 lb 6.8 oz)   LMP 01/26/2014   SpO2 100%   BMI 27.10 kg/m²     Wt Readings from Last 3 Encounters:   02/17/25 70.5 kg (155 lb 6.8 oz)   11/19/24 65 kg (143 lb 4.8 oz)   08/15/24 59.7 kg (131 lb 9.8 oz)     Temp Readings from Last 3 Encounters:   08/09/24 97.6 °F (36.4 °C) (Oral)   05/31/24 98.8 °F (37.1 °C) (Oral)   08/19/22 97.9 °F " (36.6 °C) (Oral)     BP Readings from Last 3 Encounters:   02/17/25 (!) 164/96   11/19/24 138/84   08/15/24 134/87     Pulse Readings from Last 3 Encounters:   02/17/25 77   11/19/24 80   08/15/24 85          Objective     Physical Exam  Vitals and nursing note reviewed.   Constitutional:       General: She is not in acute distress.     Appearance: Normal appearance. She is well-developed. She is not ill-appearing or diaphoretic.   HENT:      Head: Normocephalic.   Neck:      Thyroid: No thyromegaly.      Vascular: Normal carotid pulses. No carotid bruit, hepatojugular reflux or JVD.   Cardiovascular:      Rate and Rhythm: Normal rate and regular rhythm.      Chest Wall: PMI is not displaced.      Pulses: Normal pulses.           Radial pulses are 2+ on the right side and 2+ on the left side.      Heart sounds: Normal heart sounds, S1 normal and S2 normal. No murmur heard.     No friction rub. No gallop.   Pulmonary:      Effort: Pulmonary effort is normal.      Breath sounds: Normal breath sounds. No wheezing or rales.   Abdominal:      General: Bowel sounds are normal. There is no abdominal bruit.      Palpations: Abdomen is soft. There is no hepatomegaly, splenomegaly or mass.      Tenderness: There is no abdominal tenderness.   Musculoskeletal:      Cervical back: Neck supple.   Lymphadenopathy:      Cervical: No cervical adenopathy.   Skin:     General: Skin is warm.   Neurological:      Mental Status: She is alert and oriented to person, place, and time.   Psychiatric:         Behavior: Behavior normal. Behavior is cooperative.       I have reviewed all pertinent labs and cardiac studies.        Chemistry        Component Value Date/Time     02/10/2025 1017    K 4.4 02/10/2025 1017     02/10/2025 1017    CO2 26 02/10/2025 1017    BUN 14 02/10/2025 1017    CREATININE 0.9 02/10/2025 1017    GLU 91 02/10/2025 1017        Component Value Date/Time    CALCIUM 9.3 02/10/2025 1017    ALKPHOS 58 02/10/2025  1017     (H) 02/10/2025 1017     (H) 02/10/2025 1017    BILITOT 1.0 02/10/2025 1017    ESTGFRAFRICA >60.0 02/13/2014 1200    EGFRNONAA >60.0 02/13/2014 1200            Lab Results   Component Value Date    WBC 10.00 08/09/2024    HGB 10.5 (L) 08/09/2024    HCT 30.8 (L) 08/09/2024     (H) 08/09/2024     08/09/2024       Lab Results   Component Value Date    HGBA1C 5.6 09/05/2024       Lab Results   Component Value Date    CHOL 124 02/10/2025    CHOL 180 11/12/2024    CHOL 166 07/10/2024     Lab Results   Component Value Date    HDL 63 02/10/2025    HDL 58 11/12/2024    HDL 43 07/10/2024     Lab Results   Component Value Date    LDLCALC 35.6 (L) 02/10/2025    LDLCALC 83.6 11/12/2024    LDLCALC 83.8 07/10/2024     Lab Results   Component Value Date    TRIG 127 02/10/2025    TRIG 192 (H) 11/12/2024    TRIG 196 (H) 07/10/2024       Lab Results   Component Value Date    CHOLHDL 50.8 (H) 02/10/2025    CHOLHDL 32.2 11/12/2024    CHOLHDL 25.9 07/10/2024       Results for orders placed during the hospital encounter of 07/10/24    Echo    Interpretation Summary    Left Ventricle: The left ventricle is normal in size. Normal wall thickness. There is concentric hypertrophy. Normal wall motion. See diagram for wall motion findings. There is normal diastolic function.    Right Ventricle: Normal right ventricular cavity size. Wall thickness is normal. Systolic function is normal.    Left Atrium: Normal left atrial size.    Pulmonary Artery: The estimated pulmonary artery systolic pressure is 21 mmHg.    IVC/SVC: Normal venous pressure at 3 mmHg.               Assessment and Plan     1. Coronary artery disease of native artery of native heart with stable angina pectoris    2. CAD, multiple vessel    3. Abnormal ECG    4. History of non-ST elevation myocardial infarction (NSTEMI)    5. Essential hypertension    6. Elevated brain natriuretic peptide (BNP) level    7. S/P coronary artery stent placement     8. Smoker    9. Stable angina pectoris    10. Tobacco use    11. Palpitations    12. Mixed hyperlipidemia    13. Ischemic cardiomyopathy    14. Abnormal liver enzymes        Plan:      Will cut Rosuvastatin back to 10 mg qhs from 40 mg and assess response with side effects.  Also recheck CMP in 4 weeks to see about LFT improvement.   If LFTs fail to improve, will need to see GI.  Go up on Entresto mid dose for HTN control.  CAD: S/p PCI LAD for NSTEMI May 2024 and staged PCI LCX Aug 2024.  Continue DAPT and OMT for CAD.  Sl ntg prn for breakthrough angina.  HTN: Goal < 130/80.  Continue current HTN meds.  Home BP monitoring.  Ischemic cardiomyopathy: continue current med tx and monitor with f/u echo in future.  Lipids: Statin/Zetia.  History of tobacco abuse: continued smoking cessation advised.  Abnl ecg: monitor.  Palpitations: monitor.  Cardiac diet.  Daily exercise.    Phone review.    F/u 6 weeks.          I have reviewed all pertinent labs and cardiac studies independently. Plans and recommendations have been formulated under my direct supervision. All questions answered and patient voiced understanding.

## 2025-03-12 ENCOUNTER — E-VISIT (OUTPATIENT)
Dept: URGENT CARE | Facility: CLINIC | Age: 55
End: 2025-03-12
Payer: MEDICAID

## 2025-03-12 DIAGNOSIS — I25.5 ISCHEMIC CARDIOMYOPATHY: ICD-10-CM

## 2025-03-12 DIAGNOSIS — I50.9 CONGESTIVE HEART FAILURE, UNSPECIFIED HF CHRONICITY, UNSPECIFIED HEART FAILURE TYPE: Primary | ICD-10-CM

## 2025-03-12 DIAGNOSIS — I10 ESSENTIAL HYPERTENSION: ICD-10-CM

## 2025-03-12 RX ORDER — SACUBITRIL AND VALSARTAN 49; 51 MG/1; MG/1
1 TABLET, FILM COATED ORAL 2 TIMES DAILY
Qty: 180 TABLET | Refills: 0 | Status: SHIPPED | OUTPATIENT
Start: 2025-03-12

## 2025-03-12 RX ORDER — SACUBITRIL AND VALSARTAN 49; 51 MG/1; MG/1
1 TABLET, FILM COATED ORAL 2 TIMES DAILY
Qty: 180 TABLET | Refills: 0 | Status: SHIPPED | OUTPATIENT
Start: 2025-03-12 | End: 2025-03-12

## 2025-03-12 NOTE — PROGRESS NOTES
Patient ID: Eloisa Camara is a 54 y.o. female.    Chief Complaint: General Illness (Entered automatically based on patient selection in [employer]          274}  The patient initiated a request through [employer] on 3/12/2025 for evaluation and management with a chief complaint of General Illness (Entered automatically based on patient selection in [employer]     I evaluated the questionnaire submission on 03/12/2025 .    Total Time (in minutes): 7     [facility] Evisit [facility]-Medication    3/12/2025  2:00 PM CDT - Filed by Patient   What do you need help with? Medication Request   Do you agree to participate in an E-Visit? Yes   If you have any of the following symptoms, please present to your local emergency room or call 911:  I acknowledge   Medication requests for narcotics will not be addressed via an E-Visit.  Please schedule an appointment. I acknowledge   Do you have any of the following pregnancy-related conditions? None   Do you want to address a new or existing medication? I would like to address a medication I currently take   What is the main issue you would like addressed today? The pharmacy will not fill my entresto without a code from my doctor. I run out of the old prescription friday   Would you like to change or continue your medication? Continue medication   What medication would you like to continue?  Entresto   Are you taking it as prescribed? Yes    What medical condition is the  medication intended to treat? Blood pressure/heart   Is the medication helping your condition? Yes   Are you having any side effects from the medication? No   Provide any additional information you feel is important.    Please attach any relevant images or files    Are you able to take your vital signs? Yes   Systolic Blood Pressure: 135   Diastolic Blood Pressure: 84   Weight: 155   Height: 63   Pulse:    Temperature:    Respiration rate:    Pulse Oxygen:           Active Problem List with Overview Notes    Diagnosis  Date Noted    Ischemic cardiomyopathy 11/19/2024    Abnormal ECG 11/19/2024    Mixed hyperlipidemia 11/19/2024    Essential hypertension 11/19/2024    Coronary artery disease of native artery with stable angina pectoris 11/19/2024    S/P coronary artery stent placement 07/17/2024    Chest pressure 07/17/2024    Tobacco use 06/07/2024    Cardiomyopathy 06/07/2024    Palpitations 06/07/2024    Stable angina pectoris 06/07/2024    CAD, multiple vessel 05/30/2024    History of non-ST elevation myocardial infarction (NSTEMI) 05/29/2024    Hypertensive emergency 05/28/2024    Cardiomegaly 05/28/2024    Elevated brain natriuretic peptide (BNP) level 05/28/2024    Elevated troponin 05/28/2024    New onset of congestive heart failure 05/28/2024    Hypermenorrhea 02/18/2014    Smoker 01/10/2014    Polymenorrhea 01/10/2014      Recent Labs Obtained:  Lab Results   Component Value Date    WBC 10.00 08/09/2024    HGB 10.5 (L) 08/09/2024    HCT 30.8 (L) 08/09/2024     (H) 08/09/2024     08/09/2024     02/10/2025    K 4.4 02/10/2025    GLU 91 02/10/2025    CREATININE 0.9 02/10/2025    EGFRNORACEVR >60.0 02/10/2025    HGBA1C 5.6 09/05/2024    TSH 3.305 05/27/2024      Review of patient's allergies indicates:   Allergen Reactions    Iodine Itching    Demerol [meperidine] Nausea And Vomiting and Other (See Comments)     migraine       Encounter Diagnoses   Name Primary?    Essential hypertension     Ischemic cardiomyopathy         No orders of the defined types were placed in this encounter.     Medications Ordered This Encounter   Medications    sacubitriL-valsartan (ENTRESTO) 49-51 mg per tablet     Sig: Take 1 tablet by mouth 2 (two) times daily.     Dispense:  180 tablet     Refill:  0        E-Visit Time Tracking:    Day 1 Time (in minutes): 7    Total Time (in minutes): 7      274}

## 2025-03-17 ENCOUNTER — LAB VISIT (OUTPATIENT)
Dept: LAB | Facility: HOSPITAL | Age: 55
End: 2025-03-17
Attending: INTERNAL MEDICINE
Payer: MEDICAID

## 2025-03-17 DIAGNOSIS — R74.8 ABNORMAL LIVER ENZYMES: ICD-10-CM

## 2025-03-17 DIAGNOSIS — I25.118 CORONARY ARTERY DISEASE OF NATIVE ARTERY OF NATIVE HEART WITH STABLE ANGINA PECTORIS: ICD-10-CM

## 2025-03-17 PROCEDURE — 36415 COLL VENOUS BLD VENIPUNCTURE: CPT | Performed by: INTERNAL MEDICINE

## 2025-03-17 PROCEDURE — 80053 COMPREHEN METABOLIC PANEL: CPT | Performed by: INTERNAL MEDICINE

## 2025-03-18 LAB
ALBUMIN SERPL BCP-MCNC: 4.1 G/DL (ref 3.5–5.2)
ALP SERPL-CCNC: 64 U/L (ref 40–150)
ALT SERPL W/O P-5'-P-CCNC: 24 U/L (ref 10–44)
ANION GAP SERPL CALC-SCNC: 16 MMOL/L (ref 8–16)
AST SERPL-CCNC: 28 U/L (ref 10–40)
BILIRUB SERPL-MCNC: 1.1 MG/DL (ref 0.1–1)
BUN SERPL-MCNC: 9 MG/DL (ref 6–20)
CALCIUM SERPL-MCNC: 9.1 MG/DL (ref 8.7–10.5)
CHLORIDE SERPL-SCNC: 104 MMOL/L (ref 95–110)
CO2 SERPL-SCNC: 21 MMOL/L (ref 23–29)
CREAT SERPL-MCNC: 0.8 MG/DL (ref 0.5–1.4)
EST. GFR  (NO RACE VARIABLE): >60 ML/MIN/1.73 M^2
GLUCOSE SERPL-MCNC: 135 MG/DL (ref 70–110)
POTASSIUM SERPL-SCNC: 3.6 MMOL/L (ref 3.5–5.1)
PROT SERPL-MCNC: 7 G/DL (ref 6–8.4)
SODIUM SERPL-SCNC: 141 MMOL/L (ref 136–145)

## 2025-03-31 ENCOUNTER — OFFICE VISIT (OUTPATIENT)
Dept: CARDIOLOGY | Facility: CLINIC | Age: 55
End: 2025-03-31
Payer: MEDICAID

## 2025-03-31 VITALS
OXYGEN SATURATION: 96 % | HEART RATE: 83 BPM | DIASTOLIC BLOOD PRESSURE: 90 MMHG | WEIGHT: 159.19 LBS | SYSTOLIC BLOOD PRESSURE: 160 MMHG | BODY MASS INDEX: 27.75 KG/M2

## 2025-03-31 DIAGNOSIS — I25.118 CORONARY ARTERY DISEASE OF NATIVE ARTERY OF NATIVE HEART WITH STABLE ANGINA PECTORIS: ICD-10-CM

## 2025-03-31 DIAGNOSIS — I25.5 ISCHEMIC CARDIOMYOPATHY: ICD-10-CM

## 2025-03-31 DIAGNOSIS — I50.32 CHRONIC DIASTOLIC HEART FAILURE: ICD-10-CM

## 2025-03-31 DIAGNOSIS — I10 ESSENTIAL HYPERTENSION: Primary | ICD-10-CM

## 2025-03-31 DIAGNOSIS — I25.10 CAD, MULTIPLE VESSEL: ICD-10-CM

## 2025-03-31 DIAGNOSIS — Z95.5 S/P CORONARY ARTERY STENT PLACEMENT: ICD-10-CM

## 2025-03-31 DIAGNOSIS — I20.89 STABLE ANGINA PECTORIS: ICD-10-CM

## 2025-03-31 DIAGNOSIS — R00.2 PALPITATIONS: ICD-10-CM

## 2025-03-31 DIAGNOSIS — Z87.891 FORMER SMOKER: ICD-10-CM

## 2025-03-31 DIAGNOSIS — Z72.0 TOBACCO USE: ICD-10-CM

## 2025-03-31 DIAGNOSIS — E78.2 MIXED HYPERLIPIDEMIA: ICD-10-CM

## 2025-03-31 DIAGNOSIS — I25.2 HISTORY OF NON-ST ELEVATION MYOCARDIAL INFARCTION (NSTEMI): ICD-10-CM

## 2025-03-31 DIAGNOSIS — R94.31 ABNORMAL ECG: ICD-10-CM

## 2025-03-31 PROCEDURE — 99213 OFFICE O/P EST LOW 20 MIN: CPT | Mod: PBBFAC | Performed by: INTERNAL MEDICINE

## 2025-03-31 PROCEDURE — 99999 PR PBB SHADOW E&M-EST. PATIENT-LVL III: CPT | Mod: PBBFAC,,, | Performed by: INTERNAL MEDICINE

## 2025-03-31 RX ORDER — SACUBITRIL AND VALSARTAN 97; 103 MG/1; MG/1
1 TABLET, FILM COATED ORAL 2 TIMES DAILY
Qty: 180 TABLET | Refills: 3 | Status: SHIPPED | OUTPATIENT
Start: 2025-03-31

## 2025-03-31 NOTE — PROGRESS NOTES
Subjective   Patient ID:  Eloisa Camara is a 54 y.o. female who presents for evaluation of Hypertension, Hyperlipidemia, and Coronary Artery Disease      HPI: Pt presents for eval.  Current med conditions include multivessel CAD, HTN, hyperlipidemia, smoking.  Pt admitted May 2024 w NSTEMI, CHF EF 30%.  S/p LHC/RHC May 2024 with PCI severe calcified mid LAD w abrupt closure tx w JAYLEEN x 2 overlapped ([facility]).  RHC:RA 6 RV 29/7 (10) PA 29/18 (23) PCWP 11 CO 3.5 l/m  Pt had staged PCI in Aug 2024 of 80% tortuous calcified proximal-mid LCX stenosis with 2.75 x 18 mm Orsiro JAYLEEN x one.  Had ostial LCX stenosis w med mgt advised -- see cath report.  EF recovered to 50% on 7/24 echo.  Now here.  About to lose insurance per pt.  BP high, under stress.  Angina controlled.  No exertional CP sxs.  No CHF sxs.  No sl ntg use.  Not smoking.  Statin decreased last appt due to side effects.  Tolerating lower dose.  LFTs improved.        Past Medical History:   Diagnosis Date    Abnormal ECG 11/19/2024    Abnormal Pap smear     Cryo 1992, history of warts, normal pap since    CAD, multiple vessel 05/30/2024    Essential hypertension 11/19/2024    History of non-ST elevation myocardial infarction (NSTEMI) 05/29/2024    Hypertension     Mixed hyperlipidemia 11/19/2024    S/P coronary artery stent placement 07/17/2024    Tobacco use 06/07/2024       Current Outpatient Medications:     aspirin (ECOTRIN) 81 MG EC tablet, Take 1 tablet (81 mg total) by mouth once daily., Disp: 90 tablet, Rfl: 3    betamethasone valerate (LUXIQ) 0.12 % foam, Apply to affected area twice daily until cleared, Disp: 100 g, Rfl: 11    ergocalciferol (ERGOCALCIFEROL) 50,000 unit Cap, Take 1 capsule by mouth once weekly., Disp: 12 capsule, Rfl: 3    ezetimibe (ZETIA) 10 mg tablet, Take 1 tablet (10 mg total) by mouth once daily., Disp: 90 tablet, Rfl: 3    furosemide (LASIX) 40 MG tablet, Take 1 tablet (40 mg total) by mouth as needed (weight gain, swelling,  shortness of breath)., Disp: 15 tablet, Rfl: 0    isosorbide mononitrate (IMDUR) 60 MG 24 hr tablet, Take 1 tablet (60 mg total) by mouth 2 (two) times a day., Disp: 60 tablet, Rfl: 11    metoprolol succinate (TOPROL-XL) 25 MG 24 hr tablet, Take 1 tablet (25 mg total) by mouth 2 (two) times daily., Disp: 180 tablet, Rfl: 3    nitroGLYCERIN (NITROSTAT) 0.4 MG SL tablet, Place 1 tablet (0.4 mg total) under the tongue every 5 (five) minutes as needed for Chest pain., Disp: 100 tablet, Rfl: 3    potassium chloride (KLOR-CON) 10 MEQ TbSR, Take 1 tablet (10 mEq total) by mouth as needed (take on the days you take lasix)., Disp: 30 tablet, Rfl: 0    prasugreL (EFFIENT) 10 mg Tab, Take 1 tablet (10 mg total) by mouth once daily., Disp: 90 tablet, Rfl: 3    rosuvastatin (CRESTOR) 10 MG tablet, Take 1 tablet (10 mg total) by mouth every evening., Disp: 90 tablet, Rfl: 3    sacubitriL-valsartan (ENTRESTO)  mg per tablet, Take 1 tablet by mouth 2 (two) times daily., Disp: 180 tablet, Rfl: 3      Review of Systems   Constitutional: Positive for malaise/fatigue and weight gain.   HENT: Negative.     Eyes: Negative.    Cardiovascular:  Positive for chest pain.   Respiratory: Negative.     Endocrine: Negative.    Hematologic/Lymphatic: Negative.    Skin: Negative.    Musculoskeletal:  Positive for arthritis, joint pain, muscle cramps, myalgias, neck pain and stiffness.   Gastrointestinal: Negative.    Genitourinary: Negative.    Neurological: Negative.    Psychiatric/Behavioral: Negative.     Allergic/Immunologic: Negative.        BP (!) 160/90 (BP Location: Left arm, Patient Position: Sitting)   Pulse 83   Wt 72.2 kg (159 lb 2.8 oz)   LMP 01/26/2014   SpO2 96%   BMI 27.75 kg/m²     Wt Readings from Last 3 Encounters:   03/31/25 72.2 kg (159 lb 2.8 oz)   02/17/25 70.5 kg (155 lb 6.8 oz)   11/19/24 65 kg (143 lb 4.8 oz)     Temp Readings from Last 3 Encounters:   08/09/24 97.6 °F (36.4 °C) (Oral)   05/31/24 98.8 °F (37.1  °C) (Oral)   08/19/22 97.9 °F (36.6 °C) (Oral)     BP Readings from Last 3 Encounters:   03/31/25 (!) 160/90   02/17/25 (!) 164/96   11/19/24 138/84     Pulse Readings from Last 3 Encounters:   03/31/25 83   02/17/25 77   11/19/24 80          Objective     Physical Exam  Vitals and nursing note reviewed.   Constitutional:       General: She is not in acute distress.     Appearance: Normal appearance. She is well-developed and overweight. She is not ill-appearing or diaphoretic.   HENT:      Head: Normocephalic.   Neck:      Thyroid: No thyromegaly.      Vascular: No carotid bruit or JVD.   Cardiovascular:      Rate and Rhythm: Normal rate and regular rhythm.      Pulses: Normal pulses.           Radial pulses are 2+ on the right side and 2+ on the left side.      Heart sounds: Normal heart sounds, S1 normal and S2 normal. No murmur heard.     No friction rub. No gallop.   Pulmonary:      Effort: Pulmonary effort is normal.      Breath sounds: Normal breath sounds. No wheezing or rales.   Abdominal:      General: Bowel sounds are normal. There is no abdominal bruit.      Palpations: Abdomen is soft.      Tenderness: There is no abdominal tenderness.   Musculoskeletal:      Cervical back: Neck supple.   Lymphadenopathy:      Cervical: No cervical adenopathy.   Skin:     General: Skin is warm.   Neurological:      Mental Status: She is alert and oriented to person, place, and time.   Psychiatric:         Behavior: Behavior normal. Behavior is cooperative.       I have reviewed all pertinent labs and cardiac studies.        Chemistry        Component Value Date/Time     03/17/2025 1051    K 3.6 03/17/2025 1051     03/17/2025 1051    CO2 21 (L) 03/17/2025 1051    BUN 9 03/17/2025 1051    CREATININE 0.8 03/17/2025 1051     (H) 03/17/2025 1051        Component Value Date/Time    CALCIUM 9.1 03/17/2025 1051    ALKPHOS 64 03/17/2025 1051    AST 28 03/17/2025 1051    ALT 24 03/17/2025 1051    BILITOT 1.1  (H) 03/17/2025 1051    ESTGFRAFRICA >60.0 02/13/2014 1200    EGFRNONAA >60.0 02/13/2014 1200            Lab Results   Component Value Date    WBC 10.00 08/09/2024    HGB 10.5 (L) 08/09/2024    HCT 30.8 (L) 08/09/2024     (H) 08/09/2024     08/09/2024       Lab Results   Component Value Date    HGBA1C 5.6 09/05/2024       Lab Results   Component Value Date    CHOL 124 02/10/2025    CHOL 180 11/12/2024    CHOL 166 07/10/2024     Lab Results   Component Value Date    HDL 63 02/10/2025    HDL 58 11/12/2024    HDL 43 07/10/2024     Lab Results   Component Value Date    LDLCALC 35.6 (L) 02/10/2025    LDLCALC 83.6 11/12/2024    LDLCALC 83.8 07/10/2024     Lab Results   Component Value Date    TRIG 127 02/10/2025    TRIG 192 (H) 11/12/2024    TRIG 196 (H) 07/10/2024       Lab Results   Component Value Date    CHOLHDL 50.8 (H) 02/10/2025    CHOLHDL 32.2 11/12/2024    CHOLHDL 25.9 07/10/2024       Results for orders placed during the hospital encounter of 07/10/24    Echo    Interpretation Summary    Left Ventricle: The left ventricle is normal in size. Normal wall thickness. There is concentric hypertrophy. Normal wall motion. See diagram for wall motion findings. There is normal diastolic function.    Right Ventricle: Normal right ventricular cavity size. Wall thickness is normal. Systolic function is normal.    Left Atrium: Normal left atrial size.    Pulmonary Artery: The estimated pulmonary artery systolic pressure is 21 mmHg.    IVC/SVC: Normal venous pressure at 3 mmHg.               Assessment and Plan     1. Essential hypertension    2. History of non-ST elevation myocardial infarction (NSTEMI)    3. Coronary artery disease of native artery of native heart with stable angina pectoris    4. Abnormal ECG    5. CAD, multiple vessel    6. Stable angina pectoris    7. S/P coronary artery stent placement    8. Mixed hyperlipidemia    9. Ischemic cardiomyopathy    10. Tobacco use    11. Palpitations    12.  Former smoker    13. Chronic diastolic heart failure        Plan:    Increase Entresto to maximum dose for BP control.  Can increase Toprol to 75 mg or 100 mg daily total if needed as long as resting HR > 50 bpm.  Work on getting insurance issues resolved.  CAD: S/p PCI LAD for NSTEMI May 2024 and staged PCI LCX Aug 2024.  Continue DAPT and OMT for CAD.  Sl ntg prn for breakthrough angina.  HTN: Goal < 130/80.  Continue current HTN meds.  Home BP monitoring.  Ischemic cardiomyopathy: continue current med tx and monitor with f/u echo in future.  Lipids: Statin/Zetia.  History of tobacco abuse: continued smoking cessation advised.  Abnl ecg: monitor.  Palpitations: monitor.  Cardiac diet.  Daily exercise.      F/u 3 months.          I have reviewed all pertinent labs and cardiac studies independently. Plans and recommendations have been formulated under my direct supervision. All questions answered and patient voiced understanding.

## 2025-04-02 DIAGNOSIS — I21.4 NSTEMI (NON-ST ELEVATED MYOCARDIAL INFARCTION): ICD-10-CM

## 2025-04-02 DIAGNOSIS — I25.10 CAD, MULTIPLE VESSEL: ICD-10-CM

## 2025-04-02 DIAGNOSIS — I50.9 NEW ONSET OF CONGESTIVE HEART FAILURE: ICD-10-CM

## 2025-04-02 RX ORDER — ASPIRIN 81 MG/1
81 TABLET ORAL DAILY
Qty: 90 TABLET | Refills: 3
Start: 2025-04-02 | End: 2026-04-02

## 2025-05-13 ENCOUNTER — PATIENT MESSAGE (OUTPATIENT)
Dept: CARDIOLOGY | Facility: CLINIC | Age: 55
End: 2025-05-13
Payer: MEDICAID

## 2025-07-01 ENCOUNTER — OFFICE VISIT (OUTPATIENT)
Dept: CARDIOLOGY | Facility: CLINIC | Age: 55
End: 2025-07-01
Payer: MEDICAID

## 2025-07-01 VITALS
SYSTOLIC BLOOD PRESSURE: 138 MMHG | WEIGHT: 162.25 LBS | DIASTOLIC BLOOD PRESSURE: 84 MMHG | OXYGEN SATURATION: 95 % | HEART RATE: 86 BPM | BODY MASS INDEX: 28.29 KG/M2

## 2025-07-01 DIAGNOSIS — Z95.5 S/P CORONARY ARTERY STENT PLACEMENT: ICD-10-CM

## 2025-07-01 DIAGNOSIS — E78.2 MIXED HYPERLIPIDEMIA: ICD-10-CM

## 2025-07-01 DIAGNOSIS — R94.31 ABNORMAL ECG: Primary | ICD-10-CM

## 2025-07-01 DIAGNOSIS — I21.4 NSTEMI (NON-ST ELEVATED MYOCARDIAL INFARCTION): ICD-10-CM

## 2025-07-01 DIAGNOSIS — I25.10 CAD, MULTIPLE VESSEL: ICD-10-CM

## 2025-07-01 DIAGNOSIS — R00.2 PALPITATIONS: ICD-10-CM

## 2025-07-01 DIAGNOSIS — I50.9 NEW ONSET OF CONGESTIVE HEART FAILURE: ICD-10-CM

## 2025-07-01 DIAGNOSIS — I10 ESSENTIAL HYPERTENSION: ICD-10-CM

## 2025-07-01 DIAGNOSIS — I25.5 ISCHEMIC CARDIOMYOPATHY: ICD-10-CM

## 2025-07-01 DIAGNOSIS — Z87.891 FORMER SMOKER: ICD-10-CM

## 2025-07-01 DIAGNOSIS — I25.2 HISTORY OF NON-ST ELEVATION MYOCARDIAL INFARCTION (NSTEMI): ICD-10-CM

## 2025-07-01 DIAGNOSIS — I25.118 CORONARY ARTERY DISEASE OF NATIVE ARTERY OF NATIVE HEART WITH STABLE ANGINA PECTORIS: ICD-10-CM

## 2025-07-01 PROCEDURE — 1159F MED LIST DOCD IN RCRD: CPT | Mod: CPTII,,, | Performed by: INTERNAL MEDICINE

## 2025-07-01 PROCEDURE — 3008F BODY MASS INDEX DOCD: CPT | Mod: CPTII,,, | Performed by: INTERNAL MEDICINE

## 2025-07-01 PROCEDURE — 1160F RVW MEDS BY RX/DR IN RCRD: CPT | Mod: CPTII,,, | Performed by: INTERNAL MEDICINE

## 2025-07-01 PROCEDURE — 99999 PR PBB SHADOW E&M-EST. PATIENT-LVL III: CPT | Mod: PBBFAC,,, | Performed by: INTERNAL MEDICINE

## 2025-07-01 PROCEDURE — 3075F SYST BP GE 130 - 139MM HG: CPT | Mod: CPTII,,, | Performed by: INTERNAL MEDICINE

## 2025-07-01 PROCEDURE — 99214 OFFICE O/P EST MOD 30 MIN: CPT | Mod: S$PBB,,, | Performed by: INTERNAL MEDICINE

## 2025-07-01 PROCEDURE — 99213 OFFICE O/P EST LOW 20 MIN: CPT | Mod: PBBFAC | Performed by: INTERNAL MEDICINE

## 2025-07-01 PROCEDURE — 4010F ACE/ARB THERAPY RXD/TAKEN: CPT | Mod: CPTII,,, | Performed by: INTERNAL MEDICINE

## 2025-07-01 PROCEDURE — 3079F DIAST BP 80-89 MM HG: CPT | Mod: CPTII,,, | Performed by: INTERNAL MEDICINE

## 2025-07-01 RX ORDER — METOPROLOL SUCCINATE 25 MG/1
25 TABLET, EXTENDED RELEASE ORAL 2 TIMES DAILY
Qty: 180 TABLET | Refills: 3 | Status: SHIPPED | OUTPATIENT
Start: 2025-07-01 | End: 2026-06-26

## 2025-07-01 RX ORDER — PRASUGREL 10 MG/1
10 TABLET, FILM COATED ORAL DAILY
Qty: 90 TABLET | Refills: 3 | Status: SHIPPED | OUTPATIENT
Start: 2025-07-01

## 2025-07-01 RX ORDER — EZETIMIBE 10 MG/1
10 TABLET ORAL DAILY
Qty: 90 TABLET | Refills: 3 | Status: SHIPPED | OUTPATIENT
Start: 2025-07-01 | End: 2026-07-01

## 2025-07-01 RX ORDER — FUROSEMIDE 40 MG/1
40 TABLET ORAL
Qty: 30 TABLET | Refills: 3 | Status: SHIPPED | OUTPATIENT
Start: 2025-07-01

## 2025-07-01 NOTE — PROGRESS NOTES
Subjective   Patient ID:  Eloisa Camara is a 55 y.o. female who presents for evaluation of Follow-up and Coronary Artery Disease      HPI: Pt presents for eval.  Current med conditions include multivessel CAD, CHF, HTN, hyperlipidemia, h/o smoking.  Pt admitted May 2024 w NSTEMI, CHF EF 30%.  S/p LHC/RHC May 2024 with PCI severe calcified mid LAD w abrupt closure tx w JAYLEEN x 2 overlapped ([facility]).  RHC:RA 6 RV 29/7 (10) PA 29/18 (23) PCWP 11 CO 3.5 l/m  Pt had staged PCI in Aug 2024 of 80% tortuous calcified proximal-mid LCX stenosis with 2.75 x 18 mm Orsiro JAYLEEN x one.  Had ostial LCX stenosis w med mgt advised -- see cath report.  EF recovered to 50% on 7/24 echo.  Now here.  Stable CV status.  Angina controlled on current med tx.  No sl ntg use.  No CHF issues right now.  Not smoking.  BP stable.  Compliant w meds.  Weight up a few pounds.      Past Medical History:   Diagnosis Date    Abnormal ECG 11/19/2024    Abnormal Pap smear     Cryo 1992, history of warts, normal pap since    CAD, multiple vessel 05/30/2024    Essential hypertension 11/19/2024    History of non-ST elevation myocardial infarction (NSTEMI) 05/29/2024    Hypertension     Mixed hyperlipidemia 11/19/2024    S/P coronary artery stent placement 07/17/2024    Tobacco use 06/07/2024       Current Outpatient Medications:     betamethasone valerate (LUXIQ) 0.12 % foam, Apply to affected area twice daily until cleared, Disp: 100 g, Rfl: 11    isosorbide mononitrate (IMDUR) 60 MG 24 hr tablet, Take 1 tablet (60 mg total) by mouth 2 (two) times a day., Disp: 60 tablet, Rfl: 11    nitroGLYCERIN (NITROSTAT) 0.4 MG SL tablet, Place 1 tablet (0.4 mg total) under the tongue every 5 (five) minutes as needed for Chest pain., Disp: 100 tablet, Rfl: 3    potassium chloride (KLOR-CON) 10 MEQ TbSR, Take 1 tablet (10 mEq total) by mouth as needed (take on the days you take lasix)., Disp: 30 tablet, Rfl: 0    rosuvastatin (CRESTOR) 10 MG tablet, Take 1 tablet (10  mg total) by mouth every evening., Disp: 90 tablet, Rfl: 3    sacubitriL-valsartan (ENTRESTO)  mg per tablet, Take 1 tablet by mouth 2 (two) times daily., Disp: 180 tablet, Rfl: 3    aspirin (ECOTRIN) 81 MG EC tablet, Take 1 tablet (81 mg total) by mouth once daily. (Patient not taking: Reported on 7/1/2025), Disp: 90 tablet, Rfl: 3    ergocalciferol (ERGOCALCIFEROL) 50,000 unit Cap, Take 1 capsule by mouth once weekly. (Patient not taking: Reported on 7/1/2025), Disp: 12 capsule, Rfl: 3    ezetimibe (ZETIA) 10 mg tablet, Take 1 tablet (10 mg total) by mouth once daily., Disp: 90 tablet, Rfl: 3    furosemide (LASIX) 40 MG tablet, Take 1 tablet (40 mg total) by mouth as needed (weight gain, swelling, shortness of breath)., Disp: 30 tablet, Rfl: 3    metoprolol succinate (TOPROL-XL) 25 MG 24 hr tablet, Take 1 tablet (25 mg total) by mouth 2 (two) times daily., Disp: 180 tablet, Rfl: 3    prasugreL HCl (EFFIENT) 10 mg Tab, Take 1 tablet (10 mg total) by mouth once daily., Disp: 90 tablet, Rfl: 3      Review of Systems   Constitutional: Positive for malaise/fatigue and weight gain.   HENT: Negative.     Eyes: Negative.    Cardiovascular:  Positive for chest pain.   Respiratory: Negative.     Endocrine: Negative.    Hematologic/Lymphatic: Negative.    Skin: Negative.    Musculoskeletal:  Positive for arthritis, joint pain, muscle cramps, myalgias, neck pain and stiffness.   Gastrointestinal: Negative.    Genitourinary: Negative.    Neurological: Negative.    Psychiatric/Behavioral: Negative.     Allergic/Immunologic: Negative.        /84 (BP Location: Left arm, Patient Position: Sitting)   Pulse 86   Wt 73.6 kg (162 lb 4.1 oz)   LMP 01/26/2014   SpO2 95%   BMI 28.29 kg/m²     Wt Readings from Last 3 Encounters:   07/01/25 73.6 kg (162 lb 4.1 oz)   03/31/25 72.2 kg (159 lb 2.8 oz)   02/17/25 70.5 kg (155 lb 6.8 oz)     Temp Readings from Last 3 Encounters:   08/09/24 97.6 °F (36.4 °C) (Oral)   05/31/24  98.8 °F (37.1 °C) (Oral)   08/19/22 97.9 °F (36.6 °C) (Oral)     BP Readings from Last 3 Encounters:   07/01/25 138/84   03/31/25 (!) 160/90   02/17/25 (!) 164/96     Pulse Readings from Last 3 Encounters:   07/01/25 86   03/31/25 83   02/17/25 77          Objective     Physical Exam  Vitals and nursing note reviewed.   Constitutional:       General: She is not in acute distress.     Appearance: Normal appearance. She is well-developed and overweight. She is not ill-appearing or diaphoretic.   HENT:      Head: Normocephalic.   Neck:      Thyroid: No thyromegaly.      Vascular: No carotid bruit or JVD.   Cardiovascular:      Rate and Rhythm: Normal rate and regular rhythm.      Pulses: Normal pulses.           Radial pulses are 2+ on the right side and 2+ on the left side.      Heart sounds: Normal heart sounds, S1 normal and S2 normal. No murmur heard.     No friction rub. No gallop.   Pulmonary:      Effort: Pulmonary effort is normal.      Breath sounds: Normal breath sounds. No wheezing or rales.   Abdominal:      General: Bowel sounds are normal. There is no abdominal bruit.      Palpations: Abdomen is soft.      Tenderness: There is no abdominal tenderness.   Musculoskeletal:      Cervical back: Neck supple.   Lymphadenopathy:      Cervical: No cervical adenopathy.   Skin:     General: Skin is warm.   Neurological:      Mental Status: She is alert and oriented to person, place, and time.   Psychiatric:         Behavior: Behavior normal. Behavior is cooperative.       I have reviewed all pertinent labs and cardiac studies.        Chemistry        Component Value Date/Time     03/17/2025 1051    K 3.6 03/17/2025 1051     03/17/2025 1051    CO2 21 (L) 03/17/2025 1051    BUN 9 03/17/2025 1051    CREATININE 0.8 03/17/2025 1051     (H) 03/17/2025 1051        Component Value Date/Time    CALCIUM 9.1 03/17/2025 1051    ALKPHOS 64 03/17/2025 1051    AST 28 03/17/2025 1051    ALT 24 03/17/2025 1051     BILITOT 1.1 (H) 03/17/2025 1051    ESTGFRAFRICA >60.0 02/13/2014 1200    EGFRNONAA >60.0 02/13/2014 1200            Lab Results   Component Value Date    WBC 10.00 08/09/2024    HGB 10.5 (L) 08/09/2024    HCT 30.8 (L) 08/09/2024     (H) 08/09/2024     08/09/2024       Lab Results   Component Value Date    HGBA1C 5.6 09/05/2024       Lab Results   Component Value Date    CHOL 124 02/10/2025    CHOL 180 11/12/2024    CHOL 166 07/10/2024     Lab Results   Component Value Date    HDL 63 02/10/2025    HDL 58 11/12/2024    HDL 43 07/10/2024     Lab Results   Component Value Date    LDLCALC 35.6 (L) 02/10/2025    LDLCALC 83.6 11/12/2024    LDLCALC 83.8 07/10/2024     Lab Results   Component Value Date    TRIG 127 02/10/2025    TRIG 192 (H) 11/12/2024    TRIG 196 (H) 07/10/2024       Lab Results   Component Value Date    CHOLHDL 50.8 (H) 02/10/2025    CHOLHDL 32.2 11/12/2024    CHOLHDL 25.9 07/10/2024       Results for orders placed during the hospital encounter of 07/10/24    Echo    Interpretation Summary    Left Ventricle: The left ventricle is normal in size. Normal wall thickness. There is concentric hypertrophy. Normal wall motion. See diagram for wall motion findings. There is normal diastolic function.    Right Ventricle: Normal right ventricular cavity size. Wall thickness is normal. Systolic function is normal.    Left Atrium: Normal left atrial size.    Pulmonary Artery: The estimated pulmonary artery systolic pressure is 21 mmHg.    IVC/SVC: Normal venous pressure at 3 mmHg.               Assessment and Plan     1. Abnormal ECG    2. CAD, multiple vessel    3. Coronary artery disease of native artery of native heart with stable angina pectoris    4. History of non-ST elevation myocardial infarction (NSTEMI)    5. Former smoker    6. Essential hypertension    7. S/P coronary artery stent placement    8. Mixed hyperlipidemia    9. Ischemic cardiomyopathy    10. Palpitations    11. New onset of  congestive heart failure    12. NSTEMI (non-ST elevated myocardial infarction)          Plan:    Stable CV status on current medical tx.  CAD: S/p PCI LAD for NSTEMI May 2024 and staged PCI LCX Aug 2024.  Continue DAPT and OMT for CAD.  Sl ntg prn for breakthrough angina.  HTN: Goal < 130/80.  Continue current HTN meds.  Home BP monitoring.  Ischemic cardiomyopathy: continue current med tx and monitor with f/u echo in future.  Lipids: Statin/Zetia.  History of tobacco abuse: continued smoking cessation advised.  Abnl ecg: monitor.  Palpitations: monitor.  Cardiac diet.  Daily exercise.      F/u 4 months w echo/carotid u/s and fasting labs.          I have reviewed all pertinent labs and cardiac studies independently. Plans and recommendations have been formulated under my direct supervision. All questions answered and patient voiced understanding.

## (undated) DEVICE — BAND TR COMP DEVICE REG 24CM

## (undated) DEVICE — GUIDE LAUNCHER 6FR EBU 3.5

## (undated) DEVICE — CATH JR4 5FR

## (undated) DEVICE — CATH PIG145 INFINITI 5X110CM

## (undated) DEVICE — CATH SAPPH II PRO SC 2X15MM

## (undated) DEVICE — WIRE X-SUP CHOICE PT .014X182

## (undated) DEVICE — GUIDEWIRE ALL-STAR .014 300CM

## (undated) DEVICE — KIT WATCHDOG HEMSTAS VALVE 8FR

## (undated) DEVICE — INFLATOR ENCORE 26 BLLN INFL

## (undated) DEVICE — GUIDEWIRE WHOLEY HI TORQ 175CM

## (undated) DEVICE — CONTRAST VISIPAQUE 150ML

## (undated) DEVICE — OMNIPAQUE 300MG 150ML VIAL

## (undated) DEVICE — WIRE PTCE CHOICE PT .014X182

## (undated) DEVICE — ANGIOTOUCH KIT

## (undated) DEVICE — DRAPE ANGIO BRACH 38X44IN

## (undated) DEVICE — PACK ANGIOPLASTY ACCESS PLUS

## (undated) DEVICE — KIT MANIFOLD LOW PRESS TUBING

## (undated) DEVICE — CATH INFINITI MP JL3.5 JR4 5FR

## (undated) DEVICE — KIT GLIDESHEATH SLEND 6FR 10CM

## (undated) DEVICE — CATH EMERGE MR 15 X 2.50

## (undated) DEVICE — KIT SYR REUSABLE

## (undated) DEVICE — WIRE BMW 014X190

## (undated) DEVICE — CATH SHOCKWAVE C2+ IVL 2.5X12M

## (undated) DEVICE — CATH JL3.5 5FR

## (undated) DEVICE — GUIDEWIRE EMERALD .035IN 260CM

## (undated) DEVICE — PAD DEFIB CADENCE ADULT R2

## (undated) DEVICE — PACK HEART CATH BR

## (undated) DEVICE — CATH CV QD LUMN 6FRX110CM

## (undated) DEVICE — Device

## (undated) DEVICE — CATH EMERGE OTW 12 X 1.2

## (undated) DEVICE — CATH NC EMERGE MR 2.75X15MM

## (undated) DEVICE — CATH NC EMERGE MR 3X15MM